# Patient Record
Sex: MALE | Race: WHITE | NOT HISPANIC OR LATINO | Employment: OTHER | URBAN - METROPOLITAN AREA
[De-identification: names, ages, dates, MRNs, and addresses within clinical notes are randomized per-mention and may not be internally consistent; named-entity substitution may affect disease eponyms.]

---

## 2024-01-01 ENCOUNTER — APPOINTMENT (OUTPATIENT)
Dept: GENERAL RADIOLOGY | Facility: HOSPITAL | Age: 69
End: 2024-01-01
Payer: MEDICARE

## 2024-01-01 ENCOUNTER — APPOINTMENT (OUTPATIENT)
Dept: CT IMAGING | Facility: HOSPITAL | Age: 69
End: 2024-01-01
Payer: MEDICARE

## 2024-01-01 ENCOUNTER — APPOINTMENT (OUTPATIENT)
Dept: CARDIOLOGY | Facility: HOSPITAL | Age: 69
End: 2024-01-01
Payer: MEDICARE

## 2024-01-01 ENCOUNTER — APPOINTMENT (OUTPATIENT)
Dept: ULTRASOUND IMAGING | Facility: HOSPITAL | Age: 69
End: 2024-01-01
Payer: MEDICARE

## 2024-01-01 ENCOUNTER — HOSPITAL ENCOUNTER (INPATIENT)
Facility: HOSPITAL | Age: 69
LOS: 9 days | End: 2024-09-27
Attending: EMERGENCY MEDICINE | Admitting: STUDENT IN AN ORGANIZED HEALTH CARE EDUCATION/TRAINING PROGRAM
Payer: MEDICARE

## 2024-01-01 VITALS
WEIGHT: 187.17 LBS | HEART RATE: 83 BPM | DIASTOLIC BLOOD PRESSURE: 55 MMHG | RESPIRATION RATE: 25 BRPM | SYSTOLIC BLOOD PRESSURE: 95 MMHG | HEIGHT: 68 IN | BODY MASS INDEX: 28.37 KG/M2 | TEMPERATURE: 100.7 F | OXYGEN SATURATION: 96 %

## 2024-01-01 DIAGNOSIS — A41.9 SEPSIS WITH ACUTE RENAL FAILURE WITHOUT SEPTIC SHOCK, DUE TO UNSPECIFIED ORGANISM, UNSPECIFIED ACUTE RENAL FAILURE TYPE: Primary | ICD-10-CM

## 2024-01-01 DIAGNOSIS — R53.1 GENERALIZED WEAKNESS: ICD-10-CM

## 2024-01-01 DIAGNOSIS — N17.9 SEPSIS WITH ACUTE RENAL FAILURE WITHOUT SEPTIC SHOCK, DUE TO UNSPECIFIED ORGANISM, UNSPECIFIED ACUTE RENAL FAILURE TYPE: Primary | ICD-10-CM

## 2024-01-01 DIAGNOSIS — D53.9 MACROCYTIC ANEMIA: ICD-10-CM

## 2024-01-01 DIAGNOSIS — R65.20 SEPSIS WITH ACUTE RENAL FAILURE WITHOUT SEPTIC SHOCK, DUE TO UNSPECIFIED ORGANISM, UNSPECIFIED ACUTE RENAL FAILURE TYPE: Primary | ICD-10-CM

## 2024-01-01 DIAGNOSIS — J18.9 PNEUMONIA OF RIGHT LOWER LOBE DUE TO INFECTIOUS ORGANISM: ICD-10-CM

## 2024-01-01 DIAGNOSIS — R79.89 ELEVATED TROPONIN: ICD-10-CM
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
## 2024-01-01 DIAGNOSIS — S52.022A CLOSED FRACTURE OF OLECRANON PROCESS OF LEFT ULNA, INITIAL ENCOUNTER: ICD-10-CM

## 2024-01-01 DIAGNOSIS — N39.0 ACUTE UTI: ICD-10-CM

## 2024-01-01 DIAGNOSIS — N17.9 AKI (ACUTE KIDNEY INJURY): ICD-10-CM

## 2024-01-01 DIAGNOSIS — E87.6 HYPOKALEMIA: ICD-10-CM

## 2024-01-01 LAB
25(OH)D3 SERPL-MCNC: <6 NG/ML (ref 30–100)
25(OH)D3 SERPL-MCNC: <6 NG/ML (ref 30–100)
ABO GROUP BLD: NORMAL
ABO GROUP BLD: NORMAL
ALBUMIN SERPL-MCNC: 1.5 G/DL (ref 3.5–5.2)
ALBUMIN SERPL-MCNC: 1.6 G/DL (ref 3.5–5.2)
ALBUMIN SERPL-MCNC: 1.7 G/DL (ref 3.5–5.2)
ALBUMIN SERPL-MCNC: 1.7 G/DL (ref 3.5–5.2)
ALBUMIN SERPL-MCNC: 1.8 G/DL (ref 3.5–5.2)
ALBUMIN SERPL-MCNC: 1.9 G/DL (ref 3.5–5.2)
ALBUMIN SERPL-MCNC: 2 G/DL (ref 3.5–5.2)
ALBUMIN SERPL-MCNC: 2.1 G/DL (ref 3.5–5.2)
ALBUMIN SERPL-MCNC: 2.3 G/DL (ref 3.5–5.2)
ALBUMIN SERPL-MCNC: 2.5 G/DL (ref 3.5–5.2)
ALBUMIN SERPL-MCNC: 2.5 G/DL (ref 3.5–5.2)
ALBUMIN SERPL-MCNC: 2.6 G/DL (ref 3.5–5.2)
ALBUMIN SERPL-MCNC: 2.7 G/DL (ref 3.5–5.2)
ALBUMIN SERPL-MCNC: 2.9 G/DL (ref 3.5–5.2)
ALBUMIN/GLOB SERPL: 0.5 G/DL
ALBUMIN/GLOB SERPL: 0.5 G/DL
ALBUMIN/GLOB SERPL: 0.7 G/DL
ALBUMIN/GLOB SERPL: 0.8 G/DL
ALBUMIN/GLOB SERPL: 1 G/DL
ALP SERPL-CCNC: 105 U/L (ref 39–117)
ALP SERPL-CCNC: 106 U/L (ref 39–117)
ALP SERPL-CCNC: 158 U/L (ref 39–117)
ALP SERPL-CCNC: 309 U/L (ref 39–117)
ALP SERPL-CCNC: 313 U/L (ref 39–117)
ALP SERPL-CCNC: 399 U/L (ref 39–117)
ALP SERPL-CCNC: 95 U/L (ref 39–117)
ALP SERPL-CCNC: 98 U/L (ref 39–117)
ALT SERPL W P-5'-P-CCNC: 12 U/L (ref 1–41)
ALT SERPL W P-5'-P-CCNC: 15 U/L (ref 1–41)
ALT SERPL W P-5'-P-CCNC: 16 U/L (ref 1–41)
ALT SERPL W P-5'-P-CCNC: 19 U/L (ref 1–41)
ALT SERPL W P-5'-P-CCNC: 22 U/L (ref 1–41)
ALT SERPL W P-5'-P-CCNC: 36 U/L (ref 1–41)
ALT SERPL W P-5'-P-CCNC: 368 U/L (ref 1–41)
ALT SERPL W P-5'-P-CCNC: 391 U/L (ref 1–41)
ANION GAP SERPL CALCULATED.3IONS-SCNC: 10 MMOL/L (ref 5–15)
ANION GAP SERPL CALCULATED.3IONS-SCNC: 10.5 MMOL/L (ref 5–15)
ANION GAP SERPL CALCULATED.3IONS-SCNC: 10.9 MMOL/L (ref 5–15)
ANION GAP SERPL CALCULATED.3IONS-SCNC: 11 MMOL/L (ref 5–15)
ANION GAP SERPL CALCULATED.3IONS-SCNC: 12 MMOL/L (ref 5–15)
ANION GAP SERPL CALCULATED.3IONS-SCNC: 12.1 MMOL/L (ref 5–15)
ANION GAP SERPL CALCULATED.3IONS-SCNC: 12.2 MMOL/L (ref 5–15)
ANION GAP SERPL CALCULATED.3IONS-SCNC: 12.5 MMOL/L (ref 5–15)
ANION GAP SERPL CALCULATED.3IONS-SCNC: 12.8 MMOL/L (ref 5–15)
ANION GAP SERPL CALCULATED.3IONS-SCNC: 13 MMOL/L (ref 5–15)
ANION GAP SERPL CALCULATED.3IONS-SCNC: 14.3 MMOL/L (ref 5–15)
ANION GAP SERPL CALCULATED.3IONS-SCNC: 15 MMOL/L (ref 5–15)
ANION GAP SERPL CALCULATED.3IONS-SCNC: 16 MMOL/L (ref 5–15)
ANION GAP SERPL CALCULATED.3IONS-SCNC: 16.6 MMOL/L (ref 5–15)
ANION GAP SERPL CALCULATED.3IONS-SCNC: 19 MMOL/L (ref 5–15)
ANION GAP SERPL CALCULATED.3IONS-SCNC: 23.5 MMOL/L (ref 5–15)
ANION GAP SERPL CALCULATED.3IONS-SCNC: 24 MMOL/L (ref 5–15)
ANION GAP SERPL CALCULATED.3IONS-SCNC: 26.4 MMOL/L (ref 5–15)
ANION GAP SERPL CALCULATED.3IONS-SCNC: 26.8 MMOL/L (ref 5–15)
ANION GAP SERPL CALCULATED.3IONS-SCNC: 27 MMOL/L (ref 5–15)
ANION GAP SERPL CALCULATED.3IONS-SCNC: 27 MMOL/L (ref 5–15)
ANION GAP SERPL CALCULATED.3IONS-SCNC: 30.5 MMOL/L (ref 5–15)
ANION GAP SERPL CALCULATED.3IONS-SCNC: 8.3 MMOL/L (ref 5–15)
ANION GAP SERPL CALCULATED.3IONS-SCNC: 9 MMOL/L (ref 5–15)
ANION GAP SERPL CALCULATED.3IONS-SCNC: 9 MMOL/L (ref 5–15)
ANISOCYTOSIS BLD QL: ABNORMAL
AORTIC DIMENSIONLESS INDEX: 0.7 (DI)
ARTERIAL PATENCY WRIST A: POSITIVE
ASCENDING AORTA: 3.1 CM
AST SERPL-CCNC: 1432 U/L (ref 1–40)
AST SERPL-CCNC: 27 U/L (ref 1–40)
AST SERPL-CCNC: 28 U/L (ref 1–40)
AST SERPL-CCNC: 31 U/L (ref 1–40)
AST SERPL-CCNC: 36 U/L (ref 1–40)
AST SERPL-CCNC: 78 U/L (ref 1–40)
AST SERPL-CCNC: 80 U/L (ref 1–40)
AST SERPL-CCNC: 985 U/L (ref 1–40)
ATMOSPHERIC PRESS: 740.4 MMHG
ATMOSPHERIC PRESS: 744.2 MMHG
ATMOSPHERIC PRESS: 746.9 MMHG
ATMOSPHERIC PRESS: 747.7 MMHG
ATMOSPHERIC PRESS: 747.7 MMHG
ATMOSPHERIC PRESS: 747.8 MMHG
ATMOSPHERIC PRESS: 748.1 MMHG
ATMOSPHERIC PRESS: 748.2 MMHG
ATMOSPHERIC PRESS: 749.1 MMHG
ATMOSPHERIC PRESS: 749.4 MMHG
ATMOSPHERIC PRESS: 749.8 MMHG
ATMOSPHERIC PRESS: 750.6 MMHG
ATMOSPHERIC PRESS: 750.7 MMHG
ATMOSPHERIC PRESS: 750.8 MMHG
B PARAPERT DNA SPEC QL NAA+PROBE: NOT DETECTED
B PERT DNA SPEC QL NAA+PROBE: NOT DETECTED
B-OH-BUTYR SERPL-SCNC: 0.55 MMOL/L (ref 0.02–0.27)
B-OH-BUTYR SERPL-SCNC: 2.34 MMOL/L (ref 0.02–0.27)
B-OH-BUTYR SERPL-SCNC: 2.42 MMOL/L (ref 0.02–0.27)
B-OH-BUTYR SERPL-SCNC: 2.75 MMOL/L (ref 0.02–0.27)
B-OH-BUTYR SERPL-SCNC: 3.35 MMOL/L (ref 0.02–0.27)
BACTERIA BLD CULT: ABNORMAL
BACTERIA FLD CULT: NORMAL
BACTERIA SPEC AEROBE CULT: ABNORMAL
BACTERIA SPEC AEROBE CULT: ABNORMAL
BACTERIA SPEC AEROBE CULT: NORMAL
BACTERIA SPEC AEROBE CULT: NORMAL
BACTERIA SPEC RESP CULT: ABNORMAL
BACTERIA SPEC RESP CULT: ABNORMAL
BACTERIA UR QL AUTO: ABNORMAL /HPF
BASE EXCESS BLDA CALC-SCNC: -1.9 MMOL/L (ref 0–2)
BASE EXCESS BLDA CALC-SCNC: -13.1 MMOL/L (ref 0–2)
BASE EXCESS BLDA CALC-SCNC: -13.5 MMOL/L (ref 0–2)
BASE EXCESS BLDA CALC-SCNC: -19.8 MMOL/L (ref 0–2)
BASE EXCESS BLDA CALC-SCNC: -2.1 MMOL/L (ref 0–2)
BASE EXCESS BLDA CALC-SCNC: -20.1 MMOL/L (ref 0–2)
BASE EXCESS BLDA CALC-SCNC: -3.1 MMOL/L (ref 0–2)
BASE EXCESS BLDA CALC-SCNC: -3.5 MMOL/L (ref 0–2)
BASE EXCESS BLDA CALC-SCNC: -3.9 MMOL/L (ref 0–2)
BASE EXCESS BLDA CALC-SCNC: -4.9 MMOL/L (ref 0–2)
BASE EXCESS BLDA CALC-SCNC: -5.3 MMOL/L (ref 0–2)
BASE EXCESS BLDA CALC-SCNC: -7.5 MMOL/L (ref 0–2)
BASE EXCESS BLDA CALC-SCNC: 0.7 MMOL/L (ref 0–2)
BASE EXCESS BLDA CALC-SCNC: 2.6 MMOL/L (ref 0–2)
BASOPHILS # BLD AUTO: 0.02 10*3/MM3 (ref 0–0.2)
BASOPHILS # BLD AUTO: 0.03 10*3/MM3 (ref 0–0.2)
BASOPHILS # BLD AUTO: 0.03 10*3/MM3 (ref 0–0.2)
BASOPHILS # BLD AUTO: 0.04 10*3/MM3 (ref 0–0.2)
BASOPHILS # BLD AUTO: 0.05 10*3/MM3 (ref 0–0.2)
BASOPHILS # BLD AUTO: 0.05 10*3/MM3 (ref 0–0.2)
BASOPHILS # BLD MANUAL: 0 10*3/MM3 (ref 0–0.2)
BASOPHILS NFR BLD AUTO: 0.1 % (ref 0–1.5)
BASOPHILS NFR BLD AUTO: 0.2 % (ref 0–1.5)
BASOPHILS NFR BLD MANUAL: 0 % (ref 0–1.5)
BDY SITE: ABNORMAL
BH BB BLOOD EXPIRATION DATE: NORMAL
BH BB BLOOD TYPE BARCODE: 5100
BH BB DISPENSE STATUS: NORMAL
BH BB PRODUCT CODE: NORMAL
BH BB UNIT NUMBER: NORMAL
BH CV ECHO MEAS - ACS: 2.05 CM
BH CV ECHO MEAS - AO MAX PG: 7 MMHG
BH CV ECHO MEAS - AO MEAN PG: 4.3 MMHG
BH CV ECHO MEAS - AO ROOT DIAM: 3.6 CM
BH CV ECHO MEAS - AO V2 MAX: 131.9 CM/SEC
BH CV ECHO MEAS - AO V2 VTI: 25.7 CM
BH CV ECHO MEAS - AVA(I,D): 2.24 CM2
BH CV ECHO MEAS - EDV(CUBED): 113.2 ML
BH CV ECHO MEAS - EDV(MOD-SP2): 150 ML
BH CV ECHO MEAS - EDV(MOD-SP4): 149 ML
BH CV ECHO MEAS - EF(MOD-SP2): 24 %
BH CV ECHO MEAS - EF(MOD-SP4): 16.8 %
BH CV ECHO MEAS - ESV(CUBED): 73.9 ML
BH CV ECHO MEAS - ESV(MOD-SP2): 114 ML
BH CV ECHO MEAS - ESV(MOD-SP4): 124 ML
BH CV ECHO MEAS - FS: 13.2 %
BH CV ECHO MEAS - IVS/LVPW: 1.02 CM
BH CV ECHO MEAS - IVSD: 0.89 CM
BH CV ECHO MEAS - LAT PEAK E' VEL: 11.9 CM/SEC
BH CV ECHO MEAS - LV DIASTOLIC VOL/BSA (35-75): 77.5 CM2
BH CV ECHO MEAS - LV MASS(C)D: 145.8 GRAMS
BH CV ECHO MEAS - LV MAX PG: 4.7 MMHG
BH CV ECHO MEAS - LV MEAN PG: 2.32 MMHG
BH CV ECHO MEAS - LV SYSTOLIC VOL/BSA (12-30): 64.5 CM2
BH CV ECHO MEAS - LV V1 MAX: 108.3 CM/SEC
BH CV ECHO MEAS - LV V1 VTI: 19 CM
BH CV ECHO MEAS - LVIDD: 4.8 CM
BH CV ECHO MEAS - LVIDS: 4.2 CM
BH CV ECHO MEAS - LVOT AREA: 3 CM2
BH CV ECHO MEAS - LVOT DIAM: 1.97 CM
BH CV ECHO MEAS - LVPWD: 0.87 CM
BH CV ECHO MEAS - MED PEAK E' VEL: 7.4 CM/SEC
BH CV ECHO MEAS - MR MAX PG: 102 MMHG
BH CV ECHO MEAS - MR MAX VEL: 504.9 CM/SEC
BH CV ECHO MEAS - MR MEAN PG: 71.7 MMHG
BH CV ECHO MEAS - MR MEAN VEL: 402.9 CM/SEC
BH CV ECHO MEAS - MR VTI: 146.8 CM
BH CV ECHO MEAS - MV A MAX VEL: 66 CM/SEC
BH CV ECHO MEAS - MV DEC SLOPE: 882 CM/SEC2
BH CV ECHO MEAS - MV DEC TIME: 0.13 SEC
BH CV ECHO MEAS - MV E MAX VEL: 96.7 CM/SEC
BH CV ECHO MEAS - MV E/A: 1.47
BH CV ECHO MEAS - MV MAX PG: 5.8 MMHG
BH CV ECHO MEAS - MV MEAN PG: 3.3 MMHG
BH CV ECHO MEAS - MV P1/2T: 40.4 MSEC
BH CV ECHO MEAS - MV V2 VTI: 23.2 CM
BH CV ECHO MEAS - MVA(P1/2T): 5.4 CM2
BH CV ECHO MEAS - MVA(VTI): 2.48 CM2
BH CV ECHO MEAS - PA ACC TIME: 0.08 SEC
BH CV ECHO MEAS - PA V2 MAX: 106.9 CM/SEC
BH CV ECHO MEAS - PULM DIAS VEL: 64.7 CM/SEC
BH CV ECHO MEAS - PULM S/D: 0.62
BH CV ECHO MEAS - PULM SYS VEL: 40.3 CM/SEC
BH CV ECHO MEAS - RAP SYSTOLE: 3 MMHG
BH CV ECHO MEAS - RV MAX PG: 2.12 MMHG
BH CV ECHO MEAS - RV V1 MAX: 72.8 CM/SEC
BH CV ECHO MEAS - RV V1 VTI: 13.7 CM
BH CV ECHO MEAS - SUP REN AO DIAM: 1.8 CM
BH CV ECHO MEAS - SV(LVOT): 57.7 ML
BH CV ECHO MEAS - SV(MOD-SP2): 36 ML
BH CV ECHO MEAS - SV(MOD-SP4): 25 ML
BH CV ECHO MEAS - SVI(LVOT): 30 ML/M2
BH CV ECHO MEAS - SVI(MOD-SP2): 18.7 ML/M2
BH CV ECHO MEAS - SVI(MOD-SP4): 13 ML/M2
BH CV ECHO MEAS - TAPSE (>1.6): 1.71 CM
BH CV ECHO MEASUREMENTS AVERAGE E/E' RATIO: 10.02
BH CV LOWER ARTERIAL LEFT ABI RATIO: 0.67
BH CV LOWER ARTERIAL LEFT CALF RATIO: 0.71
BH CV LOWER ARTERIAL LEFT DORSALIS PEDIS SYS MAX: 68
BH CV LOWER ARTERIAL LEFT LOW THIGH RATIO: 0.94
BH CV LOWER ARTERIAL LEFT LOW THIGH SYS MAX: 95
BH CV LOWER ARTERIAL LEFT POPLITEAL SYS MAX: 72
BH CV LOWER ARTERIAL LEFT POST TIBIAL SYS MAX: 64
BH CV LOWER ARTERIAL RIGHT ABI RATIO: 0.85
BH CV LOWER ARTERIAL RIGHT CALF RATIO: 1.06
BH CV LOWER ARTERIAL RIGHT DORSALIS PEDIS SYS MAX: 72
BH CV LOWER ARTERIAL RIGHT HIGH THIGH RATIO: 1.13
BH CV LOWER ARTERIAL RIGHT HIGH THIGH SYS MAX: 114
BH CV LOWER ARTERIAL RIGHT LOW THIGH RATIO: 1.07
BH CV LOWER ARTERIAL RIGHT LOW THIGH SYS MAX: 108
BH CV LOWER ARTERIAL RIGHT POPLITEAL SYS MAX: 107
BH CV LOWER ARTERIAL RIGHT POST TIBIAL SYS MAX: 86
BH CV XLRA - RV BASE: 2.9 CM
BH CV XLRA - RV LENGTH: 7.7 CM
BH CV XLRA - RV MID: 2.14 CM
BH CV XLRA - TDI S': 14 CM/SEC
BILIRUB CONJ SERPL-MCNC: 0.4 MG/DL (ref 0–0.3)
BILIRUB INDIRECT SERPL-MCNC: 0.1 MG/DL
BILIRUB SERPL-MCNC: 0.4 MG/DL (ref 0–1.2)
BILIRUB SERPL-MCNC: 0.5 MG/DL (ref 0–1.2)
BILIRUB SERPL-MCNC: 0.6 MG/DL (ref 0–1.2)
BILIRUB SERPL-MCNC: 0.6 MG/DL (ref 0–1.2)
BILIRUB SERPL-MCNC: 0.7 MG/DL (ref 0–1.2)
BILIRUB SERPL-MCNC: 0.7 MG/DL (ref 0–1.2)
BILIRUB SERPL-MCNC: 1.2 MG/DL (ref 0–1.2)
BILIRUB SERPL-MCNC: 2.1 MG/DL (ref 0–1.2)
BILIRUB UR QL STRIP: NEGATIVE
BLD GP AB SCN SERPL QL: NEGATIVE
BLD GP AB SCN SERPL QL: NEGATIVE
BOTTLE TYPE: ABNORMAL
BUN SERPL-MCNC: 117 MG/DL (ref 8–23)
BUN SERPL-MCNC: 119 MG/DL (ref 8–23)
BUN SERPL-MCNC: 119 MG/DL (ref 8–23)
BUN SERPL-MCNC: 12 MG/DL (ref 8–23)
BUN SERPL-MCNC: 122 MG/DL (ref 8–23)
BUN SERPL-MCNC: 123 MG/DL (ref 8–23)
BUN SERPL-MCNC: 124 MG/DL (ref 8–23)
BUN SERPL-MCNC: 127 MG/DL (ref 8–23)
BUN SERPL-MCNC: 128 MG/DL (ref 8–23)
BUN SERPL-MCNC: 129 MG/DL (ref 8–23)
BUN SERPL-MCNC: 14 MG/DL (ref 8–23)
BUN SERPL-MCNC: 18 MG/DL (ref 8–23)
BUN SERPL-MCNC: 18 MG/DL (ref 8–23)
BUN SERPL-MCNC: 20 MG/DL (ref 8–23)
BUN SERPL-MCNC: 20 MG/DL (ref 8–23)
BUN SERPL-MCNC: 21 MG/DL (ref 8–23)
BUN SERPL-MCNC: 25 MG/DL (ref 8–23)
BUN SERPL-MCNC: 25 MG/DL (ref 8–23)
BUN SERPL-MCNC: 30 MG/DL (ref 8–23)
BUN SERPL-MCNC: 32 MG/DL (ref 8–23)
BUN SERPL-MCNC: 37 MG/DL (ref 8–23)
BUN SERPL-MCNC: 38 MG/DL (ref 8–23)
BUN SERPL-MCNC: 44 MG/DL (ref 8–23)
BUN SERPL-MCNC: 50 MG/DL (ref 8–23)
BUN SERPL-MCNC: 52 MG/DL (ref 8–23)
BUN SERPL-MCNC: 56 MG/DL (ref 8–23)
BUN SERPL-MCNC: 66 MG/DL (ref 8–23)
BUN SERPL-MCNC: 76 MG/DL (ref 8–23)
BUN SERPL-MCNC: 89 MG/DL (ref 8–23)
BUN/CREAT SERPL: 16.7 (ref 7–25)
BUN/CREAT SERPL: 16.8 (ref 7–25)
BUN/CREAT SERPL: 17.2 (ref 7–25)
BUN/CREAT SERPL: 17.9 (ref 7–25)
BUN/CREAT SERPL: 18.2 (ref 7–25)
BUN/CREAT SERPL: 18.2 (ref 7–25)
BUN/CREAT SERPL: 18.8 (ref 7–25)
BUN/CREAT SERPL: 18.9 (ref 7–25)
BUN/CREAT SERPL: 19 (ref 7–25)
BUN/CREAT SERPL: 19.1 (ref 7–25)
BUN/CREAT SERPL: 19.4 (ref 7–25)
BUN/CREAT SERPL: 20.4 (ref 7–25)
BUN/CREAT SERPL: 20.8 (ref 7–25)
BUN/CREAT SERPL: 21 (ref 7–25)
BUN/CREAT SERPL: 21.3 (ref 7–25)
BUN/CREAT SERPL: 21.3 (ref 7–25)
BUN/CREAT SERPL: 21.9 (ref 7–25)
BUN/CREAT SERPL: 22.7 (ref 7–25)
BUN/CREAT SERPL: 23.9 (ref 7–25)
BUN/CREAT SERPL: 24.2 (ref 7–25)
BUN/CREAT SERPL: 24.5 (ref 7–25)
BUN/CREAT SERPL: 25.2 (ref 7–25)
BUN/CREAT SERPL: 25.3 (ref 7–25)
BUN/CREAT SERPL: 26.4 (ref 7–25)
BUN/CREAT SERPL: 26.4 (ref 7–25)
BUN/CREAT SERPL: 27.5 (ref 7–25)
BUN/CREAT SERPL: 29.7 (ref 7–25)
BUN/CREAT SERPL: 29.9 (ref 7–25)
BUN/CREAT SERPL: 30.5 (ref 7–25)
BUN/CREAT SERPL: 32.3 (ref 7–25)
BUN/CREAT SERPL: 4.4 (ref 7–25)
C PNEUM DNA NPH QL NAA+NON-PROBE: NOT DETECTED
CA-I SERPL ISE-MCNC: 0.91 MMOL/L (ref 1.15–1.35)
CA-I SERPL ISE-MCNC: 0.92 MMOL/L (ref 1.15–1.35)
CA-I SERPL ISE-MCNC: 0.93 MMOL/L (ref 1.15–1.35)
CA-I SERPL ISE-MCNC: 0.93 MMOL/L (ref 1.15–1.35)
CA-I SERPL ISE-MCNC: 0.98 MMOL/L (ref 1.15–1.35)
CA-I SERPL ISE-MCNC: 1.01 MMOL/L (ref 1.15–1.35)
CA-I SERPL ISE-MCNC: 1.02 MMOL/L (ref 1.15–1.35)
CA-I SERPL ISE-MCNC: 1.04 MMOL/L (ref 1.15–1.35)
CA-I SERPL ISE-MCNC: 1.06 MMOL/L (ref 1.15–1.35)
CA-I SERPL ISE-MCNC: 1.06 MMOL/L (ref 1.15–1.35)
CA-I SERPL ISE-MCNC: 1.07 MMOL/L (ref 1.15–1.35)
CA-I SERPL ISE-MCNC: 1.09 MMOL/L (ref 1.15–1.35)
CA-I SERPL ISE-MCNC: 1.11 MMOL/L (ref 1.15–1.35)
CALCIUM SPEC-SCNC: 6.5 MG/DL (ref 8.6–10.5)
CALCIUM SPEC-SCNC: 6.5 MG/DL (ref 8.6–10.5)
CALCIUM SPEC-SCNC: 6.6 MG/DL (ref 8.6–10.5)
CALCIUM SPEC-SCNC: 6.7 MG/DL (ref 8.6–10.5)
CALCIUM SPEC-SCNC: 6.7 MG/DL (ref 8.6–10.5)
CALCIUM SPEC-SCNC: 6.8 MG/DL (ref 8.6–10.5)
CALCIUM SPEC-SCNC: 6.8 MG/DL (ref 8.6–10.5)
CALCIUM SPEC-SCNC: 6.9 MG/DL (ref 8.6–10.5)
CALCIUM SPEC-SCNC: 6.9 MG/DL (ref 8.6–10.5)
CALCIUM SPEC-SCNC: 7 MG/DL (ref 8.6–10.5)
CALCIUM SPEC-SCNC: 7 MG/DL (ref 8.6–10.5)
CALCIUM SPEC-SCNC: 7.1 MG/DL (ref 8.6–10.5)
CALCIUM SPEC-SCNC: 7.1 MG/DL (ref 8.6–10.5)
CALCIUM SPEC-SCNC: 7.2 MG/DL (ref 8.6–10.5)
CALCIUM SPEC-SCNC: 7.2 MG/DL (ref 8.6–10.5)
CALCIUM SPEC-SCNC: 7.3 MG/DL (ref 8.6–10.5)
CALCIUM SPEC-SCNC: 7.4 MG/DL (ref 8.6–10.5)
CALCIUM SPEC-SCNC: 7.5 MG/DL (ref 8.6–10.5)
CALCIUM SPEC-SCNC: 7.6 MG/DL (ref 8.6–10.5)
CALCIUM SPEC-SCNC: 7.6 MG/DL (ref 8.6–10.5)
CALCIUM SPEC-SCNC: 7.7 MG/DL (ref 8.6–10.5)
CALCIUM SPEC-SCNC: 7.8 MG/DL (ref 8.6–10.5)
CHLORIDE SERPL-SCNC: 100 MMOL/L (ref 98–107)
CHLORIDE SERPL-SCNC: 101 MMOL/L (ref 98–107)
CHLORIDE SERPL-SCNC: 102 MMOL/L (ref 98–107)
CHLORIDE SERPL-SCNC: 103 MMOL/L (ref 98–107)
CHLORIDE SERPL-SCNC: 104 MMOL/L (ref 98–107)
CHLORIDE SERPL-SCNC: 105 MMOL/L (ref 98–107)
CHLORIDE SERPL-SCNC: 106 MMOL/L (ref 98–107)
CHLORIDE SERPL-SCNC: 108 MMOL/L (ref 98–107)
CHLORIDE SERPL-SCNC: 109 MMOL/L (ref 98–107)
CHLORIDE SERPL-SCNC: 96 MMOL/L (ref 98–107)
CHLORIDE SERPL-SCNC: 99 MMOL/L (ref 98–107)
CK SERPL-CCNC: 681 U/L (ref 20–200)
CK SERPL-CCNC: 692 U/L (ref 20–200)
CK SERPL-CCNC: ABNORMAL U/L (ref 20–200)
CLARITY UR: ABNORMAL
CO2 BLDA-SCNC: 10.3 MMOL/L (ref 23–27)
CO2 BLDA-SCNC: 10.7 MMOL/L (ref 23–27)
CO2 BLDA-SCNC: 14.9 MMOL/L (ref 23–27)
CO2 BLDA-SCNC: 20.8 MMOL/L (ref 23–27)
CO2 BLDA-SCNC: 21.1 MMOL/L (ref 23–27)
CO2 BLDA-SCNC: 21.6 MMOL/L (ref 23–27)
CO2 BLDA-SCNC: 21.7 MMOL/L (ref 23–27)
CO2 BLDA-SCNC: 22.3 MMOL/L (ref 23–27)
CO2 BLDA-SCNC: 22.5 MMOL/L (ref 23–27)
CO2 BLDA-SCNC: 23.7 MMOL/L (ref 23–27)
CO2 BLDA-SCNC: 25 MMOL/L (ref 23–27)
CO2 BLDA-SCNC: 25.4 MMOL/L (ref 23–27)
CO2 BLDA-SCNC: 25.4 MMOL/L (ref 23–27)
CO2 BLDA-SCNC: 8.9 MMOL/L (ref 23–27)
CO2 SERPL-SCNC: 10.2 MMOL/L (ref 22–29)
CO2 SERPL-SCNC: 10.6 MMOL/L (ref 22–29)
CO2 SERPL-SCNC: 11 MMOL/L (ref 22–29)
CO2 SERPL-SCNC: 11.5 MMOL/L (ref 22–29)
CO2 SERPL-SCNC: 17 MMOL/L (ref 22–29)
CO2 SERPL-SCNC: 18 MMOL/L (ref 22–29)
CO2 SERPL-SCNC: 18 MMOL/L (ref 22–29)
CO2 SERPL-SCNC: 19 MMOL/L (ref 22–29)
CO2 SERPL-SCNC: 19.8 MMOL/L (ref 22–29)
CO2 SERPL-SCNC: 20 MMOL/L (ref 22–29)
CO2 SERPL-SCNC: 20.1 MMOL/L (ref 22–29)
CO2 SERPL-SCNC: 20.2 MMOL/L (ref 22–29)
CO2 SERPL-SCNC: 20.4 MMOL/L (ref 22–29)
CO2 SERPL-SCNC: 20.5 MMOL/L (ref 22–29)
CO2 SERPL-SCNC: 20.7 MMOL/L (ref 22–29)
CO2 SERPL-SCNC: 21 MMOL/L (ref 22–29)
CO2 SERPL-SCNC: 21.5 MMOL/L (ref 22–29)
CO2 SERPL-SCNC: 21.7 MMOL/L (ref 22–29)
CO2 SERPL-SCNC: 22 MMOL/L (ref 22–29)
CO2 SERPL-SCNC: 22.9 MMOL/L (ref 22–29)
CO2 SERPL-SCNC: 23 MMOL/L (ref 22–29)
CO2 SERPL-SCNC: 23 MMOL/L (ref 22–29)
CO2 SERPL-SCNC: 8 MMOL/L (ref 22–29)
CO2 SERPL-SCNC: 8 MMOL/L (ref 22–29)
CO2 SERPL-SCNC: 9.5 MMOL/L (ref 22–29)
COLOR UR: YELLOW
CREAT SERPL-MCNC: 0.59 MG/DL (ref 0.76–1.27)
CREAT SERPL-MCNC: 0.64 MG/DL (ref 0.76–1.27)
CREAT SERPL-MCNC: 0.67 MG/DL (ref 0.76–1.27)
CREAT SERPL-MCNC: 0.78 MG/DL (ref 0.76–1.27)
CREAT SERPL-MCNC: 0.84 MG/DL (ref 0.76–1.27)
CREAT SERPL-MCNC: 0.88 MG/DL (ref 0.76–1.27)
CREAT SERPL-MCNC: 0.98 MG/DL (ref 0.76–1.27)
CREAT SERPL-MCNC: 0.99 MG/DL (ref 0.76–1.27)
CREAT SERPL-MCNC: 1.07 MG/DL (ref 0.76–1.27)
CREAT SERPL-MCNC: 1.21 MG/DL (ref 0.76–1.27)
CREAT SERPL-MCNC: 1.28 MG/DL (ref 0.76–1.27)
CREAT SERPL-MCNC: 1.37 MG/DL (ref 0.76–1.27)
CREAT SERPL-MCNC: 1.37 MG/DL (ref 0.76–1.27)
CREAT SERPL-MCNC: 1.55 MG/DL (ref 0.76–1.27)
CREAT SERPL-MCNC: 1.63 MG/DL (ref 0.76–1.27)
CREAT SERPL-MCNC: 1.97 MG/DL (ref 0.76–1.27)
CREAT SERPL-MCNC: 2.12 MG/DL (ref 0.76–1.27)
CREAT SERPL-MCNC: 2.22 MG/DL (ref 0.76–1.27)
CREAT SERPL-MCNC: 2.69 MG/DL (ref 0.76–1.27)
CREAT SERPL-MCNC: 2.76 MG/DL (ref 0.76–1.27)
CREAT SERPL-MCNC: 3.15 MG/DL (ref 0.76–1.27)
CREAT SERPL-MCNC: 3.68 MG/DL (ref 0.76–1.27)
CREAT SERPL-MCNC: 5.59 MG/DL (ref 0.76–1.27)
CREAT SERPL-MCNC: 5.59 MG/DL (ref 0.76–1.27)
CREAT SERPL-MCNC: 5.87 MG/DL (ref 0.76–1.27)
CREAT SERPL-MCNC: 6.3 MG/DL (ref 0.76–1.27)
CREAT SERPL-MCNC: 6.49 MG/DL (ref 0.76–1.27)
CREAT SERPL-MCNC: 6.71 MG/DL (ref 0.76–1.27)
CREAT SERPL-MCNC: 6.72 MG/DL (ref 0.76–1.27)
CREAT SERPL-MCNC: 6.82 MG/DL (ref 0.76–1.27)
CREAT SERPL-MCNC: 7.08 MG/DL (ref 0.76–1.27)
CREAT UR-MCNC: 50 MG/DL
CROSSMATCH INTERPRETATION: NORMAL
D-LACTATE SERPL-SCNC: 0.9 MMOL/L (ref 0.5–2)
D-LACTATE SERPL-SCNC: 1.2 MMOL/L (ref 0.5–2)
D-LACTATE SERPL-SCNC: 1.5 MMOL/L (ref 0.5–2)
D-LACTATE SERPL-SCNC: 1.5 MMOL/L (ref 0.5–2)
D-LACTATE SERPL-SCNC: 1.6 MMOL/L (ref 0.5–2)
D-LACTATE SERPL-SCNC: 1.8 MMOL/L (ref 0.5–2)
DEPRECATED RDW RBC AUTO: 60 FL (ref 37–54)
DEPRECATED RDW RBC AUTO: 66.5 FL (ref 37–54)
DEPRECATED RDW RBC AUTO: 68.6 FL (ref 37–54)
DEPRECATED RDW RBC AUTO: 68.9 FL (ref 37–54)
DEPRECATED RDW RBC AUTO: 72.8 FL (ref 37–54)
DEPRECATED RDW RBC AUTO: 74.8 FL (ref 37–54)
DEPRECATED RDW RBC AUTO: 75.3 FL (ref 37–54)
DEPRECATED RDW RBC AUTO: 75.4 FL (ref 37–54)
DEPRECATED RDW RBC AUTO: 76.4 FL (ref 37–54)
DEPRECATED RDW RBC AUTO: 77.4 FL (ref 37–54)
DEPRECATED RDW RBC AUTO: 80.1 FL (ref 37–54)
DEPRECATED RDW RBC AUTO: 80.4 FL (ref 37–54)
DEPRECATED RDW RBC AUTO: 81.5 FL (ref 37–54)
DEVICE COMMENT: ABNORMAL
EGFRCR SERPLBLD CKD-EPI 2021: 10.5 ML/MIN/1.73
EGFRCR SERPLBLD CKD-EPI 2021: 10.5 ML/MIN/1.73
EGFRCR SERPLBLD CKD-EPI 2021: 102.3 ML/MIN/1.73
EGFRCR SERPLBLD CKD-EPI 2021: 103.8 ML/MIN/1.73
EGFRCR SERPLBLD CKD-EPI 2021: 106.3 ML/MIN/1.73
EGFRCR SERPLBLD CKD-EPI 2021: 17.3 ML/MIN/1.73
EGFRCR SERPLBLD CKD-EPI 2021: 20.8 ML/MIN/1.73
EGFRCR SERPLBLD CKD-EPI 2021: 24.4 ML/MIN/1.73
EGFRCR SERPLBLD CKD-EPI 2021: 25.2 ML/MIN/1.73
EGFRCR SERPLBLD CKD-EPI 2021: 31.7 ML/MIN/1.73
EGFRCR SERPLBLD CKD-EPI 2021: 33.5 ML/MIN/1.73
EGFRCR SERPLBLD CKD-EPI 2021: 36.6 ML/MIN/1.73
EGFRCR SERPLBLD CKD-EPI 2021: 45.9 ML/MIN/1.73
EGFRCR SERPLBLD CKD-EPI 2021: 48.8 ML/MIN/1.73
EGFRCR SERPLBLD CKD-EPI 2021: 56.5 ML/MIN/1.73
EGFRCR SERPLBLD CKD-EPI 2021: 56.5 ML/MIN/1.73
EGFRCR SERPLBLD CKD-EPI 2021: 61.3 ML/MIN/1.73
EGFRCR SERPLBLD CKD-EPI 2021: 65.6 ML/MIN/1.73
EGFRCR SERPLBLD CKD-EPI 2021: 7.9 ML/MIN/1.73
EGFRCR SERPLBLD CKD-EPI 2021: 76.1 ML/MIN/1.73
EGFRCR SERPLBLD CKD-EPI 2021: 8.2 ML/MIN/1.73
EGFRCR SERPLBLD CKD-EPI 2021: 8.4 ML/MIN/1.73
EGFRCR SERPLBLD CKD-EPI 2021: 8.4 ML/MIN/1.73
EGFRCR SERPLBLD CKD-EPI 2021: 8.7 ML/MIN/1.73
EGFRCR SERPLBLD CKD-EPI 2021: 83.5 ML/MIN/1.73
EGFRCR SERPLBLD CKD-EPI 2021: 84.5 ML/MIN/1.73
EGFRCR SERPLBLD CKD-EPI 2021: 9.1 ML/MIN/1.73
EGFRCR SERPLBLD CKD-EPI 2021: 9.9 ML/MIN/1.73
EGFRCR SERPLBLD CKD-EPI 2021: 94.2 ML/MIN/1.73
EGFRCR SERPLBLD CKD-EPI 2021: 95.6 ML/MIN/1.73
EGFRCR SERPLBLD CKD-EPI 2021: 97.7 ML/MIN/1.73
EOSINOPHIL # BLD AUTO: 0 10*3/MM3 (ref 0–0.4)
EOSINOPHIL # BLD AUTO: 0.01 10*3/MM3 (ref 0–0.4)
EOSINOPHIL # BLD AUTO: 0.05 10*3/MM3 (ref 0–0.4)
EOSINOPHIL # BLD AUTO: 0.14 10*3/MM3 (ref 0–0.4)
EOSINOPHIL # BLD AUTO: 0.15 10*3/MM3 (ref 0–0.4)
EOSINOPHIL # BLD AUTO: 0.23 10*3/MM3 (ref 0–0.4)
EOSINOPHIL # BLD MANUAL: 0 10*3/MM3 (ref 0–0.4)
EOSINOPHIL NFR BLD AUTO: 0 % (ref 0.3–6.2)
EOSINOPHIL NFR BLD AUTO: 0.1 % (ref 0.3–6.2)
EOSINOPHIL NFR BLD AUTO: 0.2 % (ref 0.3–6.2)
EOSINOPHIL NFR BLD AUTO: 0.8 % (ref 0.3–6.2)
EOSINOPHIL NFR BLD AUTO: 0.8 % (ref 0.3–6.2)
EOSINOPHIL NFR BLD AUTO: 1.3 % (ref 0.3–6.2)
EOSINOPHIL NFR BLD MANUAL: 0 % (ref 0.3–6.2)
ERYTHROCYTE [DISTWIDTH] IN BLOOD BY AUTOMATED COUNT: 14.6 % (ref 12.3–15.4)
ERYTHROCYTE [DISTWIDTH] IN BLOOD BY AUTOMATED COUNT: 18.7 % (ref 12.3–15.4)
ERYTHROCYTE [DISTWIDTH] IN BLOOD BY AUTOMATED COUNT: 18.8 % (ref 12.3–15.4)
ERYTHROCYTE [DISTWIDTH] IN BLOOD BY AUTOMATED COUNT: 19.3 % (ref 12.3–15.4)
ERYTHROCYTE [DISTWIDTH] IN BLOOD BY AUTOMATED COUNT: 19.6 % (ref 12.3–15.4)
ERYTHROCYTE [DISTWIDTH] IN BLOOD BY AUTOMATED COUNT: 20.4 % (ref 12.3–15.4)
ERYTHROCYTE [DISTWIDTH] IN BLOOD BY AUTOMATED COUNT: 21 % (ref 12.3–15.4)
ERYTHROCYTE [DISTWIDTH] IN BLOOD BY AUTOMATED COUNT: 21.3 % (ref 12.3–15.4)
ERYTHROCYTE [DISTWIDTH] IN BLOOD BY AUTOMATED COUNT: 21.3 % (ref 12.3–15.4)
ERYTHROCYTE [DISTWIDTH] IN BLOOD BY AUTOMATED COUNT: 21.5 % (ref 12.3–15.4)
ERYTHROCYTE [DISTWIDTH] IN BLOOD BY AUTOMATED COUNT: 21.7 % (ref 12.3–15.4)
ERYTHROCYTE [DISTWIDTH] IN BLOOD BY AUTOMATED COUNT: 21.7 % (ref 12.3–15.4)
ERYTHROCYTE [DISTWIDTH] IN BLOOD BY AUTOMATED COUNT: 22.3 % (ref 12.3–15.4)
FERRITIN SERPL-MCNC: 1835 NG/ML (ref 30–400)
FLUAV SUBTYP SPEC NAA+PROBE: NOT DETECTED
FLUBV RNA ISLT QL NAA+PROBE: NOT DETECTED
FOLATE BLD-MCNC: 570 NG/ML
FOLATE RBC-MCNC: 2405 NG/ML
FOLATE SERPL-MCNC: >20 NG/ML (ref 4.78–24.2)
GAS FLOW AIRWAY: 15 LPM
GAS FLOW AIRWAY: 2 LPM
GEN 5 2HR TROPONIN T REFLEX: 600 NG/L
GLOBULIN UR ELPH-MCNC: 2.5 GM/DL
GLOBULIN UR ELPH-MCNC: 2.6 GM/DL
GLOBULIN UR ELPH-MCNC: 2.7 GM/DL
GLOBULIN UR ELPH-MCNC: 2.7 GM/DL
GLOBULIN UR ELPH-MCNC: 2.9 GM/DL
GLOBULIN UR ELPH-MCNC: 3.1 GM/DL
GLOBULIN UR ELPH-MCNC: 3.3 GM/DL
GLUCOSE BLDC GLUCOMTR-MCNC: 105 MG/DL (ref 70–130)
GLUCOSE BLDC GLUCOMTR-MCNC: 108 MG/DL (ref 70–130)
GLUCOSE BLDC GLUCOMTR-MCNC: 110 MG/DL (ref 70–130)
GLUCOSE BLDC GLUCOMTR-MCNC: 113 MG/DL (ref 70–130)
GLUCOSE BLDC GLUCOMTR-MCNC: 116 MG/DL (ref 70–130)
GLUCOSE BLDC GLUCOMTR-MCNC: 124 MG/DL (ref 70–130)
GLUCOSE BLDC GLUCOMTR-MCNC: 133 MG/DL (ref 70–130)
GLUCOSE BLDC GLUCOMTR-MCNC: 134 MG/DL (ref 70–130)
GLUCOSE BLDC GLUCOMTR-MCNC: 136 MG/DL (ref 70–130)
GLUCOSE BLDC GLUCOMTR-MCNC: 137 MG/DL (ref 70–130)
GLUCOSE BLDC GLUCOMTR-MCNC: 139 MG/DL (ref 70–130)
GLUCOSE BLDC GLUCOMTR-MCNC: 142 MG/DL (ref 70–130)
GLUCOSE BLDC GLUCOMTR-MCNC: 157 MG/DL (ref 70–130)
GLUCOSE BLDC GLUCOMTR-MCNC: 161 MG/DL (ref 70–130)
GLUCOSE BLDC GLUCOMTR-MCNC: 162 MG/DL (ref 70–130)
GLUCOSE BLDC GLUCOMTR-MCNC: 22 MG/DL (ref 70–130)
GLUCOSE BLDC GLUCOMTR-MCNC: 244 MG/DL (ref 70–130)
GLUCOSE BLDC GLUCOMTR-MCNC: 265 MG/DL (ref 70–130)
GLUCOSE BLDC GLUCOMTR-MCNC: 35 MG/DL (ref 70–130)
GLUCOSE BLDC GLUCOMTR-MCNC: 62 MG/DL (ref 70–130)
GLUCOSE BLDC GLUCOMTR-MCNC: 71 MG/DL (ref 70–130)
GLUCOSE BLDC GLUCOMTR-MCNC: 73 MG/DL (ref 70–130)
GLUCOSE BLDC GLUCOMTR-MCNC: 75 MG/DL (ref 70–130)
GLUCOSE BLDC GLUCOMTR-MCNC: 76 MG/DL (ref 70–130)
GLUCOSE BLDC GLUCOMTR-MCNC: 77 MG/DL (ref 70–130)
GLUCOSE BLDC GLUCOMTR-MCNC: 80 MG/DL (ref 70–130)
GLUCOSE BLDC GLUCOMTR-MCNC: 82 MG/DL (ref 70–130)
GLUCOSE BLDC GLUCOMTR-MCNC: 86 MG/DL (ref 70–130)
GLUCOSE BLDC GLUCOMTR-MCNC: 91 MG/DL (ref 70–130)
GLUCOSE BLDC GLUCOMTR-MCNC: 93 MG/DL (ref 70–130)
GLUCOSE BLDC GLUCOMTR-MCNC: 95 MG/DL (ref 70–130)
GLUCOSE BLDC GLUCOMTR-MCNC: 99 MG/DL (ref 70–130)
GLUCOSE SERPL-MCNC: 106 MG/DL (ref 65–99)
GLUCOSE SERPL-MCNC: 107 MG/DL (ref 65–99)
GLUCOSE SERPL-MCNC: 112 MG/DL (ref 65–99)
GLUCOSE SERPL-MCNC: 114 MG/DL (ref 65–99)
GLUCOSE SERPL-MCNC: 115 MG/DL (ref 65–99)
GLUCOSE SERPL-MCNC: 116 MG/DL (ref 65–99)
GLUCOSE SERPL-MCNC: 119 MG/DL (ref 65–99)
GLUCOSE SERPL-MCNC: 130 MG/DL (ref 65–99)
GLUCOSE SERPL-MCNC: 139 MG/DL (ref 65–99)
GLUCOSE SERPL-MCNC: 149 MG/DL (ref 65–99)
GLUCOSE SERPL-MCNC: 158 MG/DL (ref 65–99)
GLUCOSE SERPL-MCNC: 180 MG/DL (ref 65–99)
GLUCOSE SERPL-MCNC: 233 MG/DL (ref 65–99)
GLUCOSE SERPL-MCNC: 36 MG/DL (ref 65–99)
GLUCOSE SERPL-MCNC: 56 MG/DL (ref 65–99)
GLUCOSE SERPL-MCNC: 70 MG/DL (ref 65–99)
GLUCOSE SERPL-MCNC: 70 MG/DL (ref 65–99)
GLUCOSE SERPL-MCNC: 75 MG/DL (ref 65–99)
GLUCOSE SERPL-MCNC: 79 MG/DL (ref 65–99)
GLUCOSE SERPL-MCNC: 80 MG/DL (ref 65–99)
GLUCOSE SERPL-MCNC: 81 MG/DL (ref 65–99)
GLUCOSE SERPL-MCNC: 83 MG/DL (ref 65–99)
GLUCOSE SERPL-MCNC: 86 MG/DL (ref 65–99)
GLUCOSE SERPL-MCNC: 93 MG/DL (ref 65–99)
GLUCOSE SERPL-MCNC: 95 MG/DL (ref 65–99)
GLUCOSE SERPL-MCNC: 95 MG/DL (ref 65–99)
GLUCOSE SERPL-MCNC: 97 MG/DL (ref 65–99)
GLUCOSE SERPL-MCNC: 99 MG/DL (ref 65–99)
GLUCOSE SERPL-MCNC: 99 MG/DL (ref 65–99)
GLUCOSE UR STRIP-MCNC: NEGATIVE MG/DL
GRAM STN SPEC: ABNORMAL
GRAM STN SPEC: NORMAL
HADV DNA SPEC NAA+PROBE: NOT DETECTED
HAPTOGLOB SERPL-MCNC: 344 MG/DL (ref 30–200)
HBV SURFACE AG SERPL QL IA: NORMAL
HCO3 BLDA-SCNC: 10.1 MMOL/L (ref 22–28)
HCO3 BLDA-SCNC: 13.8 MMOL/L (ref 22–28)
HCO3 BLDA-SCNC: 19.7 MMOL/L (ref 22–28)
HCO3 BLDA-SCNC: 20.2 MMOL/L (ref 22–28)
HCO3 BLDA-SCNC: 20.5 MMOL/L (ref 22–28)
HCO3 BLDA-SCNC: 20.8 MMOL/L (ref 22–28)
HCO3 BLDA-SCNC: 20.8 MMOL/L (ref 22–28)
HCO3 BLDA-SCNC: 21.2 MMOL/L (ref 22–28)
HCO3 BLDA-SCNC: 22.1 MMOL/L (ref 22–28)
HCO3 BLDA-SCNC: 23.7 MMOL/L (ref 22–28)
HCO3 BLDA-SCNC: 24.4 MMOL/L (ref 22–28)
HCO3 BLDA-SCNC: 24.6 MMOL/L (ref 22–28)
HCO3 BLDA-SCNC: 8.1 MMOL/L (ref 22–28)
HCO3 BLDA-SCNC: 9.3 MMOL/L (ref 22–28)
HCOV 229E RNA SPEC QL NAA+PROBE: NOT DETECTED
HCOV HKU1 RNA SPEC QL NAA+PROBE: NOT DETECTED
HCOV NL63 RNA SPEC QL NAA+PROBE: NOT DETECTED
HCOV OC43 RNA SPEC QL NAA+PROBE: NOT DETECTED
HCT VFR BLD AUTO: 15.6 % (ref 37.5–51)
HCT VFR BLD AUTO: 19.1 % (ref 37.5–51)
HCT VFR BLD AUTO: 19.7 % (ref 37.5–51)
HCT VFR BLD AUTO: 21.2 % (ref 37.5–51)
HCT VFR BLD AUTO: 21.4 % (ref 37.5–51)
HCT VFR BLD AUTO: 21.9 % (ref 37.5–51)
HCT VFR BLD AUTO: 22.1 % (ref 37.5–51)
HCT VFR BLD AUTO: 22.2 % (ref 37.5–51)
HCT VFR BLD AUTO: 22.7 % (ref 37.5–51)
HCT VFR BLD AUTO: 22.8 % (ref 37.5–51)
HCT VFR BLD AUTO: 23.4 % (ref 37.5–51)
HCT VFR BLD AUTO: 23.7 % (ref 37.5–51)
HCT VFR BLD AUTO: 23.8 % (ref 37.5–51)
HCT VFR BLD AUTO: 24 % (ref 37.5–51)
HCT VFR BLD AUTO: 24.4 % (ref 37.5–51)
HCT VFR BLD AUTO: 25.6 % (ref 37.5–51)
HCT VFR BLD AUTO: 26.9 % (ref 37.5–51)
HEMOCCULT STL QL: POSITIVE
HEMODILUTION: NO
HGB BLD-MCNC: 5.6 G/DL (ref 13–17.7)
HGB BLD-MCNC: 6.4 G/DL (ref 13–17.7)
HGB BLD-MCNC: 6.6 G/DL (ref 13–17.7)
HGB BLD-MCNC: 7.1 G/DL (ref 13–17.7)
HGB BLD-MCNC: 7.3 G/DL (ref 13–17.7)
HGB BLD-MCNC: 7.4 G/DL (ref 13–17.7)
HGB BLD-MCNC: 7.5 G/DL (ref 13–17.7)
HGB BLD-MCNC: 7.5 G/DL (ref 13–17.7)
HGB BLD-MCNC: 7.7 G/DL (ref 13–17.7)
HGB BLD-MCNC: 7.7 G/DL (ref 13–17.7)
HGB BLD-MCNC: 7.9 G/DL (ref 13–17.7)
HGB BLD-MCNC: 7.9 G/DL (ref 13–17.7)
HGB BLD-MCNC: 8 G/DL (ref 13–17.7)
HGB BLD-MCNC: 8.1 G/DL (ref 13–17.7)
HGB BLD-MCNC: 8.8 G/DL (ref 13–17.7)
HGB BLD-MCNC: 9.3 G/DL (ref 13–17.7)
HGB UR QL STRIP.AUTO: ABNORMAL
HMPV RNA NPH QL NAA+NON-PROBE: NOT DETECTED
HPIV1 RNA ISLT QL NAA+PROBE: NOT DETECTED
HPIV2 RNA SPEC QL NAA+PROBE: NOT DETECTED
HPIV3 RNA NPH QL NAA+PROBE: NOT DETECTED
HPIV4 P GENE NPH QL NAA+PROBE: NOT DETECTED
HYALINE CASTS UR QL AUTO: ABNORMAL /LPF
HYPOCHROMIA BLD QL: ABNORMAL
IMM GRANULOCYTES # BLD AUTO: 0.31 10*3/MM3 (ref 0–0.05)
IMM GRANULOCYTES # BLD AUTO: 0.33 10*3/MM3 (ref 0–0.05)
IMM GRANULOCYTES # BLD AUTO: 0.34 10*3/MM3 (ref 0–0.05)
IMM GRANULOCYTES # BLD AUTO: 0.41 10*3/MM3 (ref 0–0.05)
IMM GRANULOCYTES # BLD AUTO: 0.55 10*3/MM3 (ref 0–0.05)
IMM GRANULOCYTES # BLD AUTO: 0.58 10*3/MM3 (ref 0–0.05)
IMM GRANULOCYTES # BLD AUTO: 0.66 10*3/MM3 (ref 0–0.05)
IMM GRANULOCYTES # BLD AUTO: 1.11 10*3/MM3 (ref 0–0.05)
IMM GRANULOCYTES NFR BLD AUTO: 1.8 % (ref 0–0.5)
IMM GRANULOCYTES NFR BLD AUTO: 1.8 % (ref 0–0.5)
IMM GRANULOCYTES NFR BLD AUTO: 1.9 % (ref 0–0.5)
IMM GRANULOCYTES NFR BLD AUTO: 2.4 % (ref 0–0.5)
IMM GRANULOCYTES NFR BLD AUTO: 2.5 % (ref 0–0.5)
IMM GRANULOCYTES NFR BLD AUTO: 2.6 % (ref 0–0.5)
IMM GRANULOCYTES NFR BLD AUTO: 2.8 % (ref 0–0.5)
IMM GRANULOCYTES NFR BLD AUTO: 4.7 % (ref 0–0.5)
INHALED O2 CONCENTRATION: 100 %
INHALED O2 CONCENTRATION: 100 %
INHALED O2 CONCENTRATION: 28 %
INHALED O2 CONCENTRATION: 40 %
INHALED O2 CONCENTRATION: 50 %
INHALED O2 CONCENTRATION: 60 %
INHALED O2 CONCENTRATION: 80 %
INHALED O2 CONCENTRATION: 80 %
INR PPP: 1.26 (ref 0.9–1.1)
INR PPP: 1.55 (ref 0.9–1.1)
INSPIRATORY TIME: 1
IRON 24H UR-MRATE: 50 MCG/DL (ref 59–158)
IRON SATN MFR SERPL: 35 % (ref 20–50)
ISOLATED FROM: ABNORMAL
KETONES UR QL STRIP: ABNORMAL
L PNEUMO1 AG UR QL IA: NEGATIVE
LDH SERPL-CCNC: 543 U/L (ref 135–225)
LEFT ATRIUM VOLUME INDEX: 25.5 ML/M2
LEUKOCYTE ESTERASE UR QL STRIP.AUTO: ABNORMAL
LYMPHOCYTES # BLD AUTO: 0.28 10*3/MM3 (ref 0.7–3.1)
LYMPHOCYTES # BLD AUTO: 0.6 10*3/MM3 (ref 0.7–3.1)
LYMPHOCYTES # BLD AUTO: 0.63 10*3/MM3 (ref 0.7–3.1)
LYMPHOCYTES # BLD AUTO: 0.63 10*3/MM3 (ref 0.7–3.1)
LYMPHOCYTES # BLD AUTO: 0.76 10*3/MM3 (ref 0.7–3.1)
LYMPHOCYTES # BLD AUTO: 1 10*3/MM3 (ref 0.7–3.1)
LYMPHOCYTES # BLD AUTO: 1.2 10*3/MM3 (ref 0.7–3.1)
LYMPHOCYTES # BLD AUTO: 1.42 10*3/MM3 (ref 0.7–3.1)
LYMPHOCYTES # BLD MANUAL: 0.15 10*3/MM3 (ref 0.7–3.1)
LYMPHOCYTES # BLD MANUAL: 0.21 10*3/MM3 (ref 0.7–3.1)
LYMPHOCYTES NFR BLD AUTO: 1.3 % (ref 19.6–45.3)
LYMPHOCYTES NFR BLD AUTO: 3.4 % (ref 19.6–45.3)
LYMPHOCYTES NFR BLD AUTO: 3.5 % (ref 19.6–45.3)
LYMPHOCYTES NFR BLD AUTO: 3.6 % (ref 19.6–45.3)
LYMPHOCYTES NFR BLD AUTO: 4.3 % (ref 19.6–45.3)
LYMPHOCYTES NFR BLD AUTO: 4.4 % (ref 19.6–45.3)
LYMPHOCYTES NFR BLD AUTO: 5.1 % (ref 19.6–45.3)
LYMPHOCYTES NFR BLD AUTO: 5.5 % (ref 19.6–45.3)
LYMPHOCYTES NFR BLD MANUAL: 0 % (ref 5–12)
LYMPHOCYTES NFR BLD MANUAL: 1 % (ref 5–12)
Lab: ABNORMAL
M PNEUMO IGG SER IA-ACNC: NOT DETECTED
MACROCYTES BLD QL SMEAR: ABNORMAL
MAGNESIUM SERPL-MCNC: 1.6 MG/DL (ref 1.6–2.4)
MAGNESIUM SERPL-MCNC: 1.7 MG/DL (ref 1.6–2.4)
MAGNESIUM SERPL-MCNC: 1.8 MG/DL (ref 1.6–2.4)
MAGNESIUM SERPL-MCNC: 1.9 MG/DL (ref 1.6–2.4)
MAGNESIUM SERPL-MCNC: 2 MG/DL (ref 1.6–2.4)
MAGNESIUM SERPL-MCNC: 2.1 MG/DL (ref 1.6–2.4)
MAGNESIUM SERPL-MCNC: 2.1 MG/DL (ref 1.6–2.4)
MAGNESIUM SERPL-MCNC: 2.2 MG/DL (ref 1.6–2.4)
MAGNESIUM SERPL-MCNC: 2.3 MG/DL (ref 1.6–2.4)
MAGNESIUM SERPL-MCNC: 2.4 MG/DL (ref 1.6–2.4)
MAGNESIUM SERPL-MCNC: 2.5 MG/DL (ref 1.6–2.4)
MCH RBC QN AUTO: 33.1 PG (ref 26.6–33)
MCH RBC QN AUTO: 33.6 PG (ref 26.6–33)
MCH RBC QN AUTO: 33.6 PG (ref 26.6–33)
MCH RBC QN AUTO: 33.8 PG (ref 26.6–33)
MCH RBC QN AUTO: 33.9 PG (ref 26.6–33)
MCH RBC QN AUTO: 34 PG (ref 26.6–33)
MCH RBC QN AUTO: 34.2 PG (ref 26.6–33)
MCH RBC QN AUTO: 34.3 PG (ref 26.6–33)
MCH RBC QN AUTO: 34.8 PG (ref 26.6–33)
MCH RBC QN AUTO: 34.8 PG (ref 26.6–33)
MCH RBC QN AUTO: 35.1 PG (ref 26.6–33)
MCH RBC QN AUTO: 36.8 PG (ref 26.6–33)
MCH RBC QN AUTO: 41.5 PG (ref 26.6–33)
MCHC RBC AUTO-ENTMCNC: 32.9 G/DL (ref 31.5–35.7)
MCHC RBC AUTO-ENTMCNC: 33 G/DL (ref 31.5–35.7)
MCHC RBC AUTO-ENTMCNC: 33 G/DL (ref 31.5–35.7)
MCHC RBC AUTO-ENTMCNC: 33.2 G/DL (ref 31.5–35.7)
MCHC RBC AUTO-ENTMCNC: 33.5 G/DL (ref 31.5–35.7)
MCHC RBC AUTO-ENTMCNC: 34 G/DL (ref 31.5–35.7)
MCHC RBC AUTO-ENTMCNC: 34.1 G/DL (ref 31.5–35.7)
MCHC RBC AUTO-ENTMCNC: 34.4 G/DL (ref 31.5–35.7)
MCHC RBC AUTO-ENTMCNC: 34.6 G/DL (ref 31.5–35.7)
MCHC RBC AUTO-ENTMCNC: 35.2 G/DL (ref 31.5–35.7)
MCHC RBC AUTO-ENTMCNC: 35.9 G/DL (ref 31.5–35.7)
MCV RBC AUTO: 100.8 FL (ref 79–97)
MCV RBC AUTO: 100.9 FL (ref 79–97)
MCV RBC AUTO: 101.8 FL (ref 79–97)
MCV RBC AUTO: 102.7 FL (ref 79–97)
MCV RBC AUTO: 103.9 FL (ref 79–97)
MCV RBC AUTO: 104.7 FL (ref 79–97)
MCV RBC AUTO: 104.8 FL (ref 79–97)
MCV RBC AUTO: 109.8 FL (ref 79–97)
MCV RBC AUTO: 115.6 FL (ref 79–97)
MCV RBC AUTO: 95.7 FL (ref 79–97)
MCV RBC AUTO: 97.7 FL (ref 79–97)
MCV RBC AUTO: 99.1 FL (ref 79–97)
MCV RBC AUTO: 99.5 FL (ref 79–97)
MODALITY: ABNORMAL
MONOCYTES # BLD AUTO: 0.31 10*3/MM3 (ref 0.1–0.9)
MONOCYTES # BLD AUTO: 0.33 10*3/MM3 (ref 0.1–0.9)
MONOCYTES # BLD AUTO: 0.45 10*3/MM3 (ref 0.1–0.9)
MONOCYTES # BLD AUTO: 0.46 10*3/MM3 (ref 0.1–0.9)
MONOCYTES # BLD AUTO: 0.65 10*3/MM3 (ref 0.1–0.9)
MONOCYTES # BLD AUTO: 0.71 10*3/MM3 (ref 0.1–0.9)
MONOCYTES # BLD AUTO: 1.83 10*3/MM3 (ref 0.1–0.9)
MONOCYTES # BLD AUTO: 1.88 10*3/MM3 (ref 0.1–0.9)
MONOCYTES # BLD: 0 10*3/MM3 (ref 0.1–0.9)
MONOCYTES # BLD: 0.21 10*3/MM3 (ref 0.1–0.9)
MONOCYTES NFR BLD AUTO: 1.8 % (ref 5–12)
MONOCYTES NFR BLD AUTO: 2 % (ref 5–12)
MONOCYTES NFR BLD AUTO: 2.2 % (ref 5–12)
MONOCYTES NFR BLD AUTO: 2.5 % (ref 5–12)
MONOCYTES NFR BLD AUTO: 3.1 % (ref 5–12)
MONOCYTES NFR BLD AUTO: 3.6 % (ref 5–12)
MONOCYTES NFR BLD AUTO: 7.1 % (ref 5–12)
MONOCYTES NFR BLD AUTO: 8 % (ref 5–12)
MRSA DNA SPEC QL NAA+PROBE: NORMAL
NEUTROPHILS # BLD AUTO: 14.53 10*3/MM3 (ref 1.7–7)
NEUTROPHILS # BLD AUTO: 20.79 10*3/MM3 (ref 1.7–7)
NEUTROPHILS NFR BLD AUTO: 15.23 10*3/MM3 (ref 1.7–7)
NEUTROPHILS NFR BLD AUTO: 15.84 10*3/MM3 (ref 1.7–7)
NEUTROPHILS NFR BLD AUTO: 16.32 10*3/MM3 (ref 1.7–7)
NEUTROPHILS NFR BLD AUTO: 16.99 10*3/MM3 (ref 1.7–7)
NEUTROPHILS NFR BLD AUTO: 19.32 10*3/MM3 (ref 1.7–7)
NEUTROPHILS NFR BLD AUTO: 19.57 10*3/MM3 (ref 1.7–7)
NEUTROPHILS NFR BLD AUTO: 20.89 10*3/MM3 (ref 1.7–7)
NEUTROPHILS NFR BLD AUTO: 21.74 10*3/MM3 (ref 1.7–7)
NEUTROPHILS NFR BLD AUTO: 82 % (ref 42.7–76)
NEUTROPHILS NFR BLD AUTO: 84.6 % (ref 42.7–76)
NEUTROPHILS NFR BLD AUTO: 89.8 % (ref 42.7–76)
NEUTROPHILS NFR BLD AUTO: 90 % (ref 42.7–76)
NEUTROPHILS NFR BLD AUTO: 90.6 % (ref 42.7–76)
NEUTROPHILS NFR BLD AUTO: 91.3 % (ref 42.7–76)
NEUTROPHILS NFR BLD AUTO: 91.6 % (ref 42.7–76)
NEUTROPHILS NFR BLD AUTO: 93.5 % (ref 42.7–76)
NEUTROPHILS NFR BLD MANUAL: 98 % (ref 42.7–76)
NEUTROPHILS NFR BLD MANUAL: 99 % (ref 42.7–76)
NITRITE UR QL STRIP: NEGATIVE
NOTIFIED WHO: ABNORMAL
NRBC BLD AUTO-RTO: 0 /100 WBC (ref 0–0.2)
NRBC BLD AUTO-RTO: 0 /100 WBC (ref 0–0.2)
NRBC BLD AUTO-RTO: 0.1 /100 WBC (ref 0–0.2)
NRBC BLD AUTO-RTO: 0.1 /100 WBC (ref 0–0.2)
NRBC BLD AUTO-RTO: 0.4 /100 WBC (ref 0–0.2)
NRBC BLD AUTO-RTO: 0.5 /100 WBC (ref 0–0.2)
NRBC BLD AUTO-RTO: 0.9 /100 WBC (ref 0–0.2)
NT-PROBNP SERPL-MCNC: ABNORMAL PG/ML (ref 0–900)
O2 A-A PPRESDIFF RESPIRATORY: 0.1 MMHG
O2 A-A PPRESDIFF RESPIRATORY: 0.2 MMHG
O2 A-A PPRESDIFF RESPIRATORY: 0.3 MMHG
PAW @ PEAK INSP FLOW SETTING VENT: 24 CMH2O
PAW @ PEAK INSP FLOW SETTING VENT: 26 CMH2O
PCO2 BLDA: 17.2 MM HG (ref 35–45)
PCO2 BLDA: 25.1 MM HG (ref 35–45)
PCO2 BLDA: 25.4 MM HG (ref 35–45)
PCO2 BLDA: 27.8 MM HG (ref 35–45)
PCO2 BLDA: 29.8 MM HG (ref 35–45)
PCO2 BLDA: 33.5 MM HG (ref 35–45)
PCO2 BLDA: 33.6 MM HG (ref 35–45)
PCO2 BLDA: 34.9 MM HG (ref 35–45)
PCO2 BLDA: 42.8 MM HG (ref 35–45)
PCO2 BLDA: 54.4 MM HG (ref 35–45)
PCO2 BLDA: 55.8 MM HG (ref 35–45)
PEEP RESPIRATORY: 5 CM[H2O]
PEEP RESPIRATORY: 8 CM[H2O]
PH BLDA: 7.05 PH UNITS (ref 7.35–7.45)
PH BLDA: 7.11 PH UNITS (ref 7.35–7.45)
PH BLDA: 7.18 PH UNITS (ref 7.35–7.45)
PH BLDA: 7.21 PH UNITS (ref 7.35–7.45)
PH BLDA: 7.22 PH UNITS (ref 7.35–7.45)
PH BLDA: 7.35 PH UNITS (ref 7.35–7.45)
PH BLDA: 7.36 PH UNITS (ref 7.35–7.45)
PH BLDA: 7.38 PH UNITS (ref 7.35–7.45)
PH BLDA: 7.38 PH UNITS (ref 7.35–7.45)
PH BLDA: 7.39 PH UNITS (ref 7.35–7.45)
PH BLDA: 7.44 PH UNITS (ref 7.35–7.45)
PH BLDA: 7.47 PH UNITS (ref 7.35–7.45)
PH BLDA: 7.48 PH UNITS (ref 7.35–7.45)
PH BLDA: 7.6 PH UNITS (ref 7.35–7.45)
PH UR STRIP.AUTO: 5.5 [PH] (ref 5–8)
PHOSPHATE SERPL-MCNC: 2.8 MG/DL (ref 2.5–4.5)
PHOSPHATE SERPL-MCNC: 3.1 MG/DL (ref 2.5–4.5)
PHOSPHATE SERPL-MCNC: 3.3 MG/DL (ref 2.5–4.5)
PHOSPHATE SERPL-MCNC: 3.5 MG/DL (ref 2.5–4.5)
PHOSPHATE SERPL-MCNC: 3.6 MG/DL (ref 2.5–4.5)
PHOSPHATE SERPL-MCNC: 3.7 MG/DL (ref 2.5–4.5)
PHOSPHATE SERPL-MCNC: 3.7 MG/DL (ref 2.5–4.5)
PHOSPHATE SERPL-MCNC: 4.7 MG/DL (ref 2.5–4.5)
PHOSPHATE SERPL-MCNC: 4.7 MG/DL (ref 2.5–4.5)
PHOSPHATE SERPL-MCNC: 5.5 MG/DL (ref 2.5–4.5)
PHOSPHATE SERPL-MCNC: 5.5 MG/DL (ref 2.5–4.5)
PHOSPHATE SERPL-MCNC: 7.1 MG/DL (ref 2.5–4.5)
PHOSPHATE SERPL-MCNC: 7.1 MG/DL (ref 2.5–4.5)
PHOSPHATE SERPL-MCNC: 7.3 MG/DL (ref 2.5–4.5)
PHOSPHATE SERPL-MCNC: 7.4 MG/DL (ref 2.5–4.5)
PHOSPHATE SERPL-MCNC: 7.4 MG/DL (ref 2.5–4.5)
PHOSPHATE SERPL-MCNC: 7.5 MG/DL (ref 2.5–4.5)
PHOSPHATE SERPL-MCNC: 7.6 MG/DL (ref 2.5–4.5)
PHOSPHATE SERPL-MCNC: 8 MG/DL (ref 2.5–4.5)
PHOSPHATE SERPL-MCNC: 8.5 MG/DL (ref 2.5–4.5)
PHOSPHATE SERPL-MCNC: 9 MG/DL (ref 2.5–4.5)
PHOSPHATE SERPL-MCNC: 9.4 MG/DL (ref 2.5–4.5)
PHOSPHATE SERPL-MCNC: 9.5 MG/DL (ref 2.5–4.5)
PHOSPHATE SERPL-MCNC: 9.7 MG/DL (ref 2.5–4.5)
PLAT MORPH BLD: NORMAL
PLAT MORPH BLD: NORMAL
PLATELET # BLD AUTO: 104 10*3/MM3 (ref 140–450)
PLATELET # BLD AUTO: 104 10*3/MM3 (ref 140–450)
PLATELET # BLD AUTO: 124 10*3/MM3 (ref 140–450)
PLATELET # BLD AUTO: 125 10*3/MM3 (ref 140–450)
PLATELET # BLD AUTO: 128 10*3/MM3 (ref 140–450)
PLATELET # BLD AUTO: 133 10*3/MM3 (ref 140–450)
PLATELET # BLD AUTO: 137 10*3/MM3 (ref 140–450)
PLATELET # BLD AUTO: 139 10*3/MM3 (ref 140–450)
PLATELET # BLD AUTO: 142 10*3/MM3 (ref 140–450)
PLATELET # BLD AUTO: 162 10*3/MM3 (ref 140–450)
PLATELET # BLD AUTO: 73 10*3/MM3 (ref 140–450)
PLATELET # BLD AUTO: 79 10*3/MM3 (ref 140–450)
PLATELET # BLD AUTO: 80 10*3/MM3 (ref 140–450)
PLATELET # BLD AUTO: 93 10*3/MM3 (ref 140–450)
PLATELETS.RETICULATED NFR BLD AUTO: 4.4 % (ref 0.9–6.5)
PMV BLD AUTO: 10.1 FL (ref 6–12)
PMV BLD AUTO: 10.1 FL (ref 6–12)
PMV BLD AUTO: 10.3 FL (ref 6–12)
PMV BLD AUTO: 10.4 FL (ref 6–12)
PMV BLD AUTO: 10.5 FL (ref 6–12)
PMV BLD AUTO: 10.5 FL (ref 6–12)
PMV BLD AUTO: 11.1 FL (ref 6–12)
PMV BLD AUTO: 11.1 FL (ref 6–12)
PMV BLD AUTO: 11.5 FL (ref 6–12)
PMV BLD AUTO: 11.8 FL (ref 6–12)
PMV BLD AUTO: 12.1 FL (ref 6–12)
PMV BLD AUTO: 12.1 FL (ref 6–12)
PMV BLD AUTO: 12.2 FL (ref 6–12)
PO2 BLD: 141 MM[HG] (ref 0–500)
PO2 BLD: 151 MM[HG] (ref 0–500)
PO2 BLD: 161 MM[HG] (ref 0–500)
PO2 BLD: 176 MM[HG] (ref 0–500)
PO2 BLD: 182 MM[HG] (ref 0–500)
PO2 BLD: 183 MM[HG] (ref 0–500)
PO2 BLD: 188 MM[HG] (ref 0–500)
PO2 BLD: 188 MM[HG] (ref 0–500)
PO2 BLD: 192 MM[HG] (ref 0–500)
PO2 BLD: 196 MM[HG] (ref 0–500)
PO2 BLD: 200 MM[HG] (ref 0–500)
PO2 BLD: 214 MM[HG] (ref 0–500)
PO2 BLD: 218 MM[HG] (ref 0–500)
PO2 BLD: 51 MM[HG] (ref 0–500)
PO2 BLDA: 108.9 MM HG (ref 80–100)
PO2 BLDA: 112.7 MM HG (ref 80–100)
PO2 BLDA: 128.6 MM HG (ref 80–100)
PO2 BLDA: 150.1 MM HG (ref 80–100)
PO2 BLDA: 191.9 MM HG (ref 80–100)
PO2 BLDA: 50.7 MM HG (ref 80–100)
PO2 BLDA: 60.9 MM HG (ref 80–100)
PO2 BLDA: 64.4 MM HG (ref 80–100)
PO2 BLDA: 78.5 MM HG (ref 80–100)
PO2 BLDA: 79.9 MM HG (ref 80–100)
PO2 BLDA: 88.1 MM HG (ref 80–100)
PO2 BLDA: 90.5 MM HG (ref 80–100)
PO2 BLDA: 91.7 MM HG (ref 80–100)
PO2 BLDA: 93.9 MM HG (ref 80–100)
POTASSIUM SERPL-SCNC: 2.3 MMOL/L (ref 3.5–5.2)
POTASSIUM SERPL-SCNC: 2.6 MMOL/L (ref 3.5–5.2)
POTASSIUM SERPL-SCNC: 2.6 MMOL/L (ref 3.5–5.2)
POTASSIUM SERPL-SCNC: 2.8 MMOL/L (ref 3.5–5.2)
POTASSIUM SERPL-SCNC: 2.8 MMOL/L (ref 3.5–5.2)
POTASSIUM SERPL-SCNC: 3 MMOL/L (ref 3.5–5.2)
POTASSIUM SERPL-SCNC: 3.1 MMOL/L (ref 3.5–5.2)
POTASSIUM SERPL-SCNC: 3.2 MMOL/L (ref 3.5–5.2)
POTASSIUM SERPL-SCNC: 3.5 MMOL/L (ref 3.5–5.2)
POTASSIUM SERPL-SCNC: 3.5 MMOL/L (ref 3.5–5.2)
POTASSIUM SERPL-SCNC: 3.6 MMOL/L (ref 3.5–5.2)
POTASSIUM SERPL-SCNC: 3.7 MMOL/L (ref 3.5–5.2)
POTASSIUM SERPL-SCNC: 3.8 MMOL/L (ref 3.5–5.2)
POTASSIUM SERPL-SCNC: 3.8 MMOL/L (ref 3.5–5.2)
POTASSIUM SERPL-SCNC: 3.9 MMOL/L (ref 3.5–5.2)
POTASSIUM SERPL-SCNC: 4.7 MMOL/L (ref 3.5–5.2)
POTASSIUM SERPL-SCNC: 4.8 MMOL/L (ref 3.5–5.2)
POTASSIUM SERPL-SCNC: 5 MMOL/L (ref 3.5–5.2)
POTASSIUM SERPL-SCNC: 5.3 MMOL/L (ref 3.5–5.2)
POTASSIUM SERPL-SCNC: 5.5 MMOL/L (ref 3.5–5.2)
POTASSIUM SERPL-SCNC: 5.5 MMOL/L (ref 3.5–5.2)
POTASSIUM SERPL-SCNC: 5.6 MMOL/L (ref 3.5–5.2)
POTASSIUM SERPL-SCNC: 5.7 MMOL/L (ref 3.5–5.2)
POTASSIUM SERPL-SCNC: 5.9 MMOL/L (ref 3.5–5.2)
POTASSIUM SERPL-SCNC: 6.1 MMOL/L (ref 3.5–5.2)
POTASSIUM SERPL-SCNC: 6.4 MMOL/L (ref 3.5–5.2)
PROCALCITONIN SERPL-MCNC: 1.41 NG/ML (ref 0–0.25)
PROT SERPL-MCNC: 4.5 G/DL (ref 6–8.5)
PROT SERPL-MCNC: 4.6 G/DL (ref 6–8.5)
PROT SERPL-MCNC: 4.7 G/DL (ref 6–8.5)
PROT SERPL-MCNC: 4.9 G/DL (ref 6–8.5)
PROT SERPL-MCNC: 5.2 G/DL (ref 6–8.5)
PROT SERPL-MCNC: 5.2 G/DL (ref 6–8.5)
PROT SERPL-MCNC: 5.3 G/DL (ref 6–8.5)
PROT SERPL-MCNC: 5.8 G/DL (ref 6–8.5)
PROT UR QL STRIP: ABNORMAL
PROTHROMBIN TIME: 16.1 SECONDS (ref 11.7–14.2)
PROTHROMBIN TIME: 18.8 SECONDS (ref 11.7–14.2)
QT INTERVAL: 365 MS
QT INTERVAL: 387 MS
QT INTERVAL: 391 MS
QT INTERVAL: 428 MS
QTC INTERVAL: 500 MS
QTC INTERVAL: 524 MS
QTC INTERVAL: 526 MS
QTC INTERVAL: 545 MS
RBC # BLD AUTO: 1.35 10*6/MM3 (ref 4.14–5.8)
RBC # BLD AUTO: 1.74 10*6/MM3 (ref 4.14–5.8)
RBC # BLD AUTO: 1.88 10*6/MM3 (ref 4.14–5.8)
RBC # BLD AUTO: 2.15 10*6/MM3 (ref 4.14–5.8)
RBC # BLD AUTO: 2.19 10*6/MM3 (ref 4.14–5.8)
RBC # BLD AUTO: 2.21 10*6/MM3 (ref 4.14–5.8)
RBC # BLD AUTO: 2.21 10*6/MM3 (ref 4.14–5.8)
RBC # BLD AUTO: 2.23 10*6/MM3 (ref 4.14–5.8)
RBC # BLD AUTO: 2.31 10*6/MM3 (ref 4.14–5.8)
RBC # BLD AUTO: 2.33 10*6/MM3 (ref 4.14–5.8)
RBC # BLD AUTO: 2.36 10*6/MM3 (ref 4.14–5.8)
RBC # BLD AUTO: 2.62 10*6/MM3 (ref 4.14–5.8)
RBC # BLD AUTO: 2.81 10*6/MM3 (ref 4.14–5.8)
RBC # UR STRIP: ABNORMAL /HPF
RBC MORPH BLD: NORMAL
READ BACK: YES
REF LAB TEST METHOD: ABNORMAL
RH BLD: POSITIVE
RH BLD: POSITIVE
RHINOVIRUS RNA SPEC NAA+PROBE: NOT DETECTED
RSV RNA NPH QL NAA+NON-PROBE: NOT DETECTED
S PNEUM AG SPEC QL LA: NEGATIVE
SALICYLATES SERPL-MCNC: <0.3 MG/DL
SAO2 % BLDCOA: 69.2 % (ref 92–98.5)
SAO2 % BLDCOA: 91.4 % (ref 92–98.5)
SAO2 % BLDCOA: 94.1 % (ref 92–98.5)
SAO2 % BLDCOA: 95.3 % (ref 92–98.5)
SAO2 % BLDCOA: 96.3 % (ref 92–98.5)
SAO2 % BLDCOA: 96.6 % (ref 92–98.5)
SAO2 % BLDCOA: 96.9 % (ref 92–98.5)
SAO2 % BLDCOA: 97 % (ref 92–98.5)
SAO2 % BLDCOA: 97.1 % (ref 92–98.5)
SAO2 % BLDCOA: 97.1 % (ref 92–98.5)
SAO2 % BLDCOA: 98.4 % (ref 92–98.5)
SAO2 % BLDCOA: 98.8 % (ref 92–98.5)
SAO2 % BLDCOA: 99.1 % (ref 92–98.5)
SAO2 % BLDCOA: 99.3 % (ref 92–98.5)
SARS-COV-2 RNA NPH QL NAA+NON-PROBE: NOT DETECTED
SET MECH RESP RATE: 10
SET MECH RESP RATE: 16
SET MECH RESP RATE: 18
SET MECH RESP RATE: 18
SET MECH RESP RATE: 20
SET MECH RESP RATE: 24
SINUS: 3.2 CM
SODIUM SERPL-SCNC: 133 MMOL/L (ref 136–145)
SODIUM SERPL-SCNC: 134 MMOL/L (ref 136–145)
SODIUM SERPL-SCNC: 135 MMOL/L (ref 136–145)
SODIUM SERPL-SCNC: 136 MMOL/L (ref 136–145)
SODIUM SERPL-SCNC: 136 MMOL/L (ref 136–145)
SODIUM SERPL-SCNC: 137 MMOL/L (ref 136–145)
SODIUM SERPL-SCNC: 138 MMOL/L (ref 136–145)
SODIUM SERPL-SCNC: 139 MMOL/L (ref 136–145)
SODIUM SERPL-SCNC: 139 MMOL/L (ref 136–145)
SODIUM SERPL-SCNC: 140 MMOL/L (ref 136–145)
SODIUM SERPL-SCNC: 141 MMOL/L (ref 136–145)
SODIUM SERPL-SCNC: 142 MMOL/L (ref 136–145)
SODIUM SERPL-SCNC: 145 MMOL/L (ref 136–145)
SODIUM SERPL-SCNC: 147 MMOL/L (ref 136–145)
SODIUM UR-SCNC: 50 MMOL/L
SP GR UR STRIP: 1.01 (ref 1–1.03)
SQUAMOUS #/AREA URNS HPF: ABNORMAL /HPF
STJ: 2.28 CM
T&S EXPIRATION DATE: NORMAL
T&S EXPIRATION DATE: NORMAL
T3FREE SERPL-MCNC: 0.97 PG/ML (ref 2–4.4)
T4 FREE SERPL-MCNC: 0.98 NG/DL (ref 0.92–1.68)
TIBC SERPL-MCNC: 143 MCG/DL (ref 298–536)
TOTAL RATE: 20 BREATHS/MINUTE
TOTAL RATE: 20 BREATHS/MINUTE
TOTAL RATE: 21 BREATHS/MINUTE
TOTAL RATE: 21 BREATHS/MINUTE
TOTAL RATE: 22 BREATHS/MINUTE
TOTAL RATE: 22 BREATHS/MINUTE
TOTAL RATE: 23 BREATHS/MINUTE
TOTAL RATE: 23 BREATHS/MINUTE
TOTAL RATE: 25 BREATHS/MINUTE
TOTAL RATE: 26 BREATHS/MINUTE
TOTAL RATE: 27 BREATHS/MINUTE
TOTAL RATE: 29 BREATHS/MINUTE
TOTAL RATE: 44 BREATHS/MINUTE
TRANSFERRIN SERPL-MCNC: 96 MG/DL (ref 200–360)
TRIGL SERPL-MCNC: 392 MG/DL (ref 0–150)
TRIGL SERPL-MCNC: 402 MG/DL (ref 0–150)
TROPONIN T DELTA: -65 NG/L
TROPONIN T SERPL HS-MCNC: 665 NG/L
TSH SERPL DL<=0.05 MIU/L-ACNC: 0.09 UIU/ML (ref 0.27–4.2)
UNIT  ABO: NORMAL
UNIT  RH: NORMAL
UPPER ARTERIAL RIGHT ARM BRACHIAL SYS MAX: 101
UROBILINOGEN UR QL STRIP: ABNORMAL
VANCOMYCIN SERPL-MCNC: 26.8 MCG/ML (ref 5–40)
VANCOMYCIN TROUGH SERPL-MCNC: 4.4 MCG/ML (ref 5–20)
VANCOMYCIN TROUGH SERPL-MCNC: 9.1 MCG/ML (ref 5–20)
VARIANT LYMPHS NFR BLD MANUAL: 1 % (ref 19.6–45.3)
VARIANT LYMPHS NFR BLD MANUAL: 1 % (ref 19.6–45.3)
VENTILATOR MODE: ABNORMAL
VIT B12 BLD-MCNC: 1370 PG/ML (ref 211–946)
VT ON VENT VENT: 442 ML
VT ON VENT VENT: 505 ML
VT ON VENT VENT: 587 ML
VT ON VENT VENT: 609 ML
VT ON VENT VENT: 611 ML
VT ON VENT VENT: 693 ML
VT ON VENT VENT: 735 ML
WBC # UR STRIP: ABNORMAL /HPF
WBC MORPH BLD: NORMAL
WBC MORPH BLD: NORMAL
WBC NRBC COR # BLD AUTO: 14.68 10*3/MM3 (ref 3.4–10.8)
WBC NRBC COR # BLD AUTO: 16.61 10*3/MM3 (ref 3.4–10.8)
WBC NRBC COR # BLD AUTO: 17.47 10*3/MM3 (ref 3.4–10.8)
WBC NRBC COR # BLD AUTO: 18.12 10*3/MM3 (ref 3.4–10.8)
WBC NRBC COR # BLD AUTO: 18.59 10*3/MM3 (ref 3.4–10.8)
WBC NRBC COR # BLD AUTO: 20.92 10*3/MM3 (ref 3.4–10.8)
WBC NRBC COR # BLD AUTO: 21.21 10*3/MM3 (ref 3.4–10.8)
WBC NRBC COR # BLD AUTO: 22.43 10*3/MM3 (ref 3.4–10.8)
WBC NRBC COR # BLD AUTO: 22.71 10*3/MM3 (ref 3.4–10.8)
WBC NRBC COR # BLD AUTO: 23.25 10*3/MM3 (ref 3.4–10.8)
WBC NRBC COR # BLD AUTO: 23.55 10*3/MM3 (ref 3.4–10.8)
WBC NRBC COR # BLD AUTO: 25.7 10*3/MM3 (ref 3.4–10.8)
WBC NRBC COR # BLD AUTO: 25.96 10*3/MM3 (ref 3.4–10.8)

## 2024-01-01 PROCEDURE — 93005 ELECTROCARDIOGRAM TRACING: CPT

## 2024-01-01 PROCEDURE — 83010 ASSAY OF HAPTOGLOBIN QUANT: CPT | Performed by: INTERNAL MEDICINE

## 2024-01-01 PROCEDURE — 82803 BLOOD GASES ANY COMBINATION: CPT | Performed by: HOSPITALIST

## 2024-01-01 PROCEDURE — 71250 CT THORAX DX C-: CPT

## 2024-01-01 PROCEDURE — 25010000002 VANCOMYCIN 10 G RECONSTITUTED SOLUTION: Performed by: INTERNAL MEDICINE

## 2024-01-01 PROCEDURE — 84100 ASSAY OF PHOSPHORUS: CPT | Performed by: INTERNAL MEDICINE

## 2024-01-01 PROCEDURE — 82272 OCCULT BLD FECES 1-3 TESTS: CPT

## 2024-01-01 PROCEDURE — 25010000002 GLYCOPYRROLATE 0.2 MG/ML SOLUTION: Performed by: INTERNAL MEDICINE

## 2024-01-01 PROCEDURE — 83735 ASSAY OF MAGNESIUM: CPT | Performed by: HOSPITALIST

## 2024-01-01 PROCEDURE — 36600 WITHDRAWAL OF ARTERIAL BLOOD: CPT

## 2024-01-01 PROCEDURE — 94799 UNLISTED PULMONARY SVC/PX: CPT

## 2024-01-01 PROCEDURE — 82010 KETONE BODYS QUAN: CPT | Performed by: INTERNAL MEDICINE

## 2024-01-01 PROCEDURE — 25010000002 FENTANYL CITRATE (PF) 50 MCG/ML SOLUTION: Performed by: INTERNAL MEDICINE

## 2024-01-01 PROCEDURE — 25010000002 VANCOMYCIN 750 MG RECONSTITUTED SOLUTION 1 EACH VIAL: Performed by: INTERNAL MEDICINE

## 2024-01-01 PROCEDURE — 86850 RBC ANTIBODY SCREEN: CPT | Performed by: INTERNAL MEDICINE

## 2024-01-01 PROCEDURE — 87181 SC STD AGAR DILUTION PER AGT: CPT | Performed by: EMERGENCY MEDICINE

## 2024-01-01 PROCEDURE — 25010000002 METRONIDAZOLE 500 MG/100ML SOLUTION: Performed by: INTERNAL MEDICINE

## 2024-01-01 PROCEDURE — 94761 N-INVAS EAR/PLS OXIMETRY MLT: CPT

## 2024-01-01 PROCEDURE — 85027 COMPLETE CBC AUTOMATED: CPT | Performed by: INTERNAL MEDICINE

## 2024-01-01 PROCEDURE — 86923 COMPATIBILITY TEST ELECTRIC: CPT

## 2024-01-01 PROCEDURE — 83735 ASSAY OF MAGNESIUM: CPT | Performed by: STUDENT IN AN ORGANIZED HEALTH CARE EDUCATION/TRAINING PROGRAM

## 2024-01-01 PROCEDURE — P9016 RBC LEUKOCYTES REDUCED: HCPCS

## 2024-01-01 PROCEDURE — 80179 DRUG ASSAY SALICYLATE: CPT | Performed by: INTERNAL MEDICINE

## 2024-01-01 PROCEDURE — 25010000002 HEPARIN (PORCINE) PER 1000 UNITS: Performed by: INTERNAL MEDICINE

## 2024-01-01 PROCEDURE — 0BH17EZ INSERTION OF ENDOTRACHEAL AIRWAY INTO TRACHEA, VIA NATURAL OR ARTIFICIAL OPENING: ICD-10-PCS | Performed by: INTERNAL MEDICINE

## 2024-01-01 PROCEDURE — 85018 HEMOGLOBIN: CPT | Performed by: INTERNAL MEDICINE

## 2024-01-01 PROCEDURE — 94003 VENT MGMT INPAT SUBQ DAY: CPT

## 2024-01-01 PROCEDURE — 76705 ECHO EXAM OF ABDOMEN: CPT

## 2024-01-01 PROCEDURE — 25010000002 PROPOFOL 10 MG/ML EMULSION: Performed by: INTERNAL MEDICINE

## 2024-01-01 PROCEDURE — 82803 BLOOD GASES ANY COMBINATION: CPT

## 2024-01-01 PROCEDURE — 86901 BLOOD TYPING SEROLOGIC RH(D): CPT | Performed by: EMERGENCY MEDICINE

## 2024-01-01 PROCEDURE — 93010 ELECTROCARDIOGRAM REPORT: CPT | Performed by: INTERNAL MEDICINE

## 2024-01-01 PROCEDURE — 87205 SMEAR GRAM STAIN: CPT | Performed by: INTERNAL MEDICINE

## 2024-01-01 PROCEDURE — 82306 VITAMIN D 25 HYDROXY: CPT | Performed by: STUDENT IN AN ORGANIZED HEALTH CARE EDUCATION/TRAINING PROGRAM

## 2024-01-01 PROCEDURE — 83735 ASSAY OF MAGNESIUM: CPT | Performed by: INTERNAL MEDICINE

## 2024-01-01 PROCEDURE — 85014 HEMATOCRIT: CPT | Performed by: INTERNAL MEDICINE

## 2024-01-01 PROCEDURE — P9612 CATHETERIZE FOR URINE SPEC: HCPCS

## 2024-01-01 PROCEDURE — 25010000002 METRONIDAZOLE 500 MG/100ML SOLUTION: Performed by: STUDENT IN AN ORGANIZED HEALTH CARE EDUCATION/TRAINING PROGRAM

## 2024-01-01 PROCEDURE — 85025 COMPLETE CBC W/AUTO DIFF WBC: CPT | Performed by: STUDENT IN AN ORGANIZED HEALTH CARE EDUCATION/TRAINING PROGRAM

## 2024-01-01 PROCEDURE — 36600 WITHDRAWAL OF ARTERIAL BLOOD: CPT | Performed by: HOSPITALIST

## 2024-01-01 PROCEDURE — 36600 WITHDRAWAL OF ARTERIAL BLOOD: CPT | Performed by: INTERNAL MEDICINE

## 2024-01-01 PROCEDURE — 87040 BLOOD CULTURE FOR BACTERIA: CPT | Performed by: INTERNAL MEDICINE

## 2024-01-01 PROCEDURE — 25810000003 SODIUM CHLORIDE 0.9 % SOLUTION 250 ML FLEX CONT: Performed by: INTERNAL MEDICINE

## 2024-01-01 PROCEDURE — 93005 ELECTROCARDIOGRAM TRACING: CPT | Performed by: INTERNAL MEDICINE

## 2024-01-01 PROCEDURE — 84478 ASSAY OF TRIGLYCERIDES: CPT | Performed by: INTERNAL MEDICINE

## 2024-01-01 PROCEDURE — 25010000002 CALCIUM GLUCONATE-NACL 1-0.675 GM/50ML-% SOLUTION: Performed by: HOSPITALIST

## 2024-01-01 PROCEDURE — 99232 SBSQ HOSP IP/OBS MODERATE 35: CPT | Performed by: STUDENT IN AN ORGANIZED HEALTH CARE EDUCATION/TRAINING PROGRAM

## 2024-01-01 PROCEDURE — 87186 SC STD MICRODIL/AGAR DIL: CPT | Performed by: EMERGENCY MEDICINE

## 2024-01-01 PROCEDURE — 93922 UPR/L XTREMITY ART 2 LEVELS: CPT

## 2024-01-01 PROCEDURE — 93923 UPR/LXTR ART STDY 3+ LVLS: CPT | Performed by: SURGERY

## 2024-01-01 PROCEDURE — 25010000002 HEPARIN LOCK FLUSH PER 10 UNITS: Performed by: INTERNAL MEDICINE

## 2024-01-01 PROCEDURE — 99233 SBSQ HOSP IP/OBS HIGH 50: CPT | Performed by: STUDENT IN AN ORGANIZED HEALTH CARE EDUCATION/TRAINING PROGRAM

## 2024-01-01 PROCEDURE — 82570 ASSAY OF URINE CREATININE: CPT | Performed by: INTERNAL MEDICINE

## 2024-01-01 PROCEDURE — 87641 MR-STAPH DNA AMP PROBE: CPT | Performed by: INTERNAL MEDICINE

## 2024-01-01 PROCEDURE — 94664 DEMO&/EVAL PT USE INHALER: CPT

## 2024-01-01 PROCEDURE — 25810000003 SODIUM CHLORIDE 0.9 % SOLUTION: Performed by: INTERNAL MEDICINE

## 2024-01-01 PROCEDURE — 82948 REAGENT STRIP/BLOOD GLUCOSE: CPT

## 2024-01-01 PROCEDURE — 84100 ASSAY OF PHOSPHORUS: CPT | Performed by: PHYSICIAN ASSISTANT

## 2024-01-01 PROCEDURE — 87340 HEPATITIS B SURFACE AG IA: CPT | Performed by: INTERNAL MEDICINE

## 2024-01-01 PROCEDURE — 82550 ASSAY OF CK (CPK): CPT | Performed by: INTERNAL MEDICINE

## 2024-01-01 PROCEDURE — 93306 TTE W/DOPPLER COMPLETE: CPT | Performed by: INTERNAL MEDICINE

## 2024-01-01 PROCEDURE — 82330 ASSAY OF CALCIUM: CPT | Performed by: INTERNAL MEDICINE

## 2024-01-01 PROCEDURE — 85025 COMPLETE CBC W/AUTO DIFF WBC: CPT

## 2024-01-01 PROCEDURE — 25010000002 LORAZEPAM PER 2 MG: Performed by: STUDENT IN AN ORGANIZED HEALTH CARE EDUCATION/TRAINING PROGRAM

## 2024-01-01 PROCEDURE — 25010000002 POTASSIUM CHLORIDE 10 MEQ/100ML SOLUTION: Performed by: INTERNAL MEDICINE

## 2024-01-01 PROCEDURE — 86900 BLOOD TYPING SEROLOGIC ABO: CPT | Performed by: EMERGENCY MEDICINE

## 2024-01-01 PROCEDURE — 25010000002 CEFEPIME PER 500 MG: Performed by: INTERNAL MEDICINE

## 2024-01-01 PROCEDURE — 25010000002 LIDOCAINE 1 % SOLUTION: Performed by: RADIOLOGY

## 2024-01-01 PROCEDURE — 36415 COLL VENOUS BLD VENIPUNCTURE: CPT | Performed by: INTERNAL MEDICINE

## 2024-01-01 PROCEDURE — 25010000002 LORAZEPAM PER 2 MG: Performed by: INTERNAL MEDICINE

## 2024-01-01 PROCEDURE — 84439 ASSAY OF FREE THYROXINE: CPT | Performed by: EMERGENCY MEDICINE

## 2024-01-01 PROCEDURE — 25010000002 MAGNESIUM SULFATE 2 GM/50ML SOLUTION: Performed by: EMERGENCY MEDICINE

## 2024-01-01 PROCEDURE — 83880 ASSAY OF NATRIURETIC PEPTIDE: CPT | Performed by: EMERGENCY MEDICINE

## 2024-01-01 PROCEDURE — 87015 SPECIMEN INFECT AGNT CONCNTJ: CPT | Performed by: STUDENT IN AN ORGANIZED HEALTH CARE EDUCATION/TRAINING PROGRAM

## 2024-01-01 PROCEDURE — 84443 ASSAY THYROID STIM HORMONE: CPT | Performed by: EMERGENCY MEDICINE

## 2024-01-01 PROCEDURE — 82330 ASSAY OF CALCIUM: CPT | Performed by: HOSPITALIST

## 2024-01-01 PROCEDURE — 71045 X-RAY EXAM CHEST 1 VIEW: CPT

## 2024-01-01 PROCEDURE — 83735 ASSAY OF MAGNESIUM: CPT | Performed by: EMERGENCY MEDICINE

## 2024-01-01 PROCEDURE — 94002 VENT MGMT INPAT INIT DAY: CPT

## 2024-01-01 PROCEDURE — 83605 ASSAY OF LACTIC ACID: CPT | Performed by: INTERNAL MEDICINE

## 2024-01-01 PROCEDURE — 93306 TTE W/DOPPLER COMPLETE: CPT

## 2024-01-01 PROCEDURE — 87070 CULTURE OTHR SPECIMN AEROBIC: CPT | Performed by: INTERNAL MEDICINE

## 2024-01-01 PROCEDURE — 85610 PROTHROMBIN TIME: CPT

## 2024-01-01 PROCEDURE — 85610 PROTHROMBIN TIME: CPT | Performed by: INTERNAL MEDICINE

## 2024-01-01 PROCEDURE — 93923 UPR/LXTR ART STDY 3+ LVLS: CPT

## 2024-01-01 PROCEDURE — 83615 LACTATE (LD) (LDH) ENZYME: CPT | Performed by: INTERNAL MEDICINE

## 2024-01-01 PROCEDURE — 80202 ASSAY OF VANCOMYCIN: CPT | Performed by: INTERNAL MEDICINE

## 2024-01-01 PROCEDURE — 99233 SBSQ HOSP IP/OBS HIGH 50: CPT | Performed by: INTERNAL MEDICINE

## 2024-01-01 PROCEDURE — 25010000002 CALCIUM GLUCONATE 2-0.675 GM/100ML-% SOLUTION: Performed by: HOSPITALIST

## 2024-01-01 PROCEDURE — 25010000002 VASOPRESSIN 20 UNIT/ML SOLUTION: Performed by: INTERNAL MEDICINE

## 2024-01-01 PROCEDURE — 0202U NFCT DS 22 TRGT SARS-COV-2: CPT | Performed by: NURSE PRACTITIONER

## 2024-01-01 PROCEDURE — 25010000002 THIAMINE HCL 200 MG/2ML SOLUTION: Performed by: INTERNAL MEDICINE

## 2024-01-01 PROCEDURE — 85025 COMPLETE CBC W/AUTO DIFF WBC: CPT | Performed by: INTERNAL MEDICINE

## 2024-01-01 PROCEDURE — 25010000002 MORPHINE PER 10 MG: Performed by: INTERNAL MEDICINE

## 2024-01-01 PROCEDURE — 82248 BILIRUBIN DIRECT: CPT | Performed by: INTERNAL MEDICINE

## 2024-01-01 PROCEDURE — 87086 URINE CULTURE/COLONY COUNT: CPT | Performed by: EMERGENCY MEDICINE

## 2024-01-01 PROCEDURE — 83735 ASSAY OF MAGNESIUM: CPT

## 2024-01-01 PROCEDURE — 25010000002 CEFTRIAXONE PER 250 MG: Performed by: PHYSICIAN ASSISTANT

## 2024-01-01 PROCEDURE — 86901 BLOOD TYPING SEROLOGIC RH(D): CPT | Performed by: INTERNAL MEDICINE

## 2024-01-01 PROCEDURE — 25010000002 PHENYLEPHRINE 10 MG/ML SOLUTION: Performed by: INTERNAL MEDICINE

## 2024-01-01 PROCEDURE — 80053 COMPREHEN METABOLIC PANEL: CPT | Performed by: INTERNAL MEDICINE

## 2024-01-01 PROCEDURE — 70450 CT HEAD/BRAIN W/O DYE: CPT

## 2024-01-01 PROCEDURE — 80053 COMPREHEN METABOLIC PANEL: CPT | Performed by: EMERGENCY MEDICINE

## 2024-01-01 PROCEDURE — 81001 URINALYSIS AUTO W/SCOPE: CPT | Performed by: EMERGENCY MEDICINE

## 2024-01-01 PROCEDURE — 82550 ASSAY OF CK (CPK): CPT | Performed by: HOSPITALIST

## 2024-01-01 PROCEDURE — 93005 ELECTROCARDIOGRAM TRACING: CPT | Performed by: EMERGENCY MEDICINE

## 2024-01-01 PROCEDURE — 82747 ASSAY OF FOLIC ACID RBC: CPT | Performed by: INTERNAL MEDICINE

## 2024-01-01 PROCEDURE — 86900 BLOOD TYPING SEROLOGIC ABO: CPT

## 2024-01-01 PROCEDURE — 82728 ASSAY OF FERRITIN: CPT | Performed by: NURSE PRACTITIONER

## 2024-01-01 PROCEDURE — 99223 1ST HOSP IP/OBS HIGH 75: CPT | Performed by: STUDENT IN AN ORGANIZED HEALTH CARE EDUCATION/TRAINING PROGRAM

## 2024-01-01 PROCEDURE — 87205 SMEAR GRAM STAIN: CPT | Performed by: STUDENT IN AN ORGANIZED HEALTH CARE EDUCATION/TRAINING PROGRAM

## 2024-01-01 PROCEDURE — 02HV33Z INSERTION OF INFUSION DEVICE INTO SUPERIOR VENA CAVA, PERCUTANEOUS APPROACH: ICD-10-PCS | Performed by: INTERNAL MEDICINE

## 2024-01-01 PROCEDURE — 87449 NOS EACH ORGANISM AG IA: CPT | Performed by: NURSE PRACTITIONER

## 2024-01-01 PROCEDURE — 84300 ASSAY OF URINE SODIUM: CPT | Performed by: INTERNAL MEDICINE

## 2024-01-01 PROCEDURE — 85025 COMPLETE CBC W/AUTO DIFF WBC: CPT | Performed by: EMERGENCY MEDICINE

## 2024-01-01 PROCEDURE — 82330 ASSAY OF CALCIUM: CPT

## 2024-01-01 PROCEDURE — 73070 X-RAY EXAM OF ELBOW: CPT

## 2024-01-01 PROCEDURE — 5A1955Z RESPIRATORY VENTILATION, GREATER THAN 96 CONSECUTIVE HOURS: ICD-10-PCS | Performed by: INTERNAL MEDICINE

## 2024-01-01 PROCEDURE — 80069 RENAL FUNCTION PANEL: CPT | Performed by: INTERNAL MEDICINE

## 2024-01-01 PROCEDURE — 85007 BL SMEAR W/DIFF WBC COUNT: CPT | Performed by: EMERGENCY MEDICINE

## 2024-01-01 PROCEDURE — 86900 BLOOD TYPING SEROLOGIC ABO: CPT | Performed by: INTERNAL MEDICINE

## 2024-01-01 PROCEDURE — 25010000002 BUMETANIDE PER 0.5 MG: Performed by: INTERNAL MEDICINE

## 2024-01-01 PROCEDURE — 87147 CULTURE TYPE IMMUNOLOGIC: CPT | Performed by: INTERNAL MEDICINE

## 2024-01-01 PROCEDURE — 87075 CULTR BACTERIA EXCEPT BLOOD: CPT | Performed by: STUDENT IN AN ORGANIZED HEALTH CARE EDUCATION/TRAINING PROGRAM

## 2024-01-01 PROCEDURE — 36430 TRANSFUSION BLD/BLD COMPNT: CPT

## 2024-01-01 PROCEDURE — 76775 US EXAM ABDO BACK WALL LIM: CPT

## 2024-01-01 PROCEDURE — 84484 ASSAY OF TROPONIN QUANT: CPT | Performed by: EMERGENCY MEDICINE

## 2024-01-01 PROCEDURE — 99285 EMERGENCY DEPT VISIT HI MDM: CPT

## 2024-01-01 PROCEDURE — 0F9430Z DRAINAGE OF GALLBLADDER WITH DRAINAGE DEVICE, PERCUTANEOUS APPROACH: ICD-10-PCS | Performed by: RADIOLOGY

## 2024-01-01 PROCEDURE — 85027 COMPLETE CBC AUTOMATED: CPT | Performed by: STUDENT IN AN ORGANIZED HEALTH CARE EDUCATION/TRAINING PROGRAM

## 2024-01-01 PROCEDURE — 5A1D90Z PERFORMANCE OF URINARY FILTRATION, CONTINUOUS, GREATER THAN 18 HOURS PER DAY: ICD-10-PCS | Performed by: INTERNAL MEDICINE

## 2024-01-01 PROCEDURE — 84100 ASSAY OF PHOSPHORUS: CPT

## 2024-01-01 PROCEDURE — 80048 BASIC METABOLIC PNL TOTAL CA: CPT | Performed by: INTERNAL MEDICINE

## 2024-01-01 PROCEDURE — 25010000002 CEFTRIAXONE PER 250 MG: Performed by: STUDENT IN AN ORGANIZED HEALTH CARE EDUCATION/TRAINING PROGRAM

## 2024-01-01 PROCEDURE — 99232 SBSQ HOSP IP/OBS MODERATE 35: CPT | Performed by: INTERNAL MEDICINE

## 2024-01-01 PROCEDURE — 80053 COMPREHEN METABOLIC PANEL: CPT

## 2024-01-01 PROCEDURE — 25010000002 POTASSIUM CHLORIDE 10 MEQ/100ML SOLUTION: Performed by: EMERGENCY MEDICINE

## 2024-01-01 PROCEDURE — 83605 ASSAY OF LACTIC ACID: CPT | Performed by: EMERGENCY MEDICINE

## 2024-01-01 PROCEDURE — 94760 N-INVAS EAR/PLS OXIMETRY 1: CPT

## 2024-01-01 PROCEDURE — 84145 PROCALCITONIN (PCT): CPT | Performed by: EMERGENCY MEDICINE

## 2024-01-01 PROCEDURE — 82746 ASSAY OF FOLIC ACID SERUM: CPT | Performed by: NURSE PRACTITIONER

## 2024-01-01 PROCEDURE — 83540 ASSAY OF IRON: CPT | Performed by: NURSE PRACTITIONER

## 2024-01-01 PROCEDURE — 63710000001 INSULIN REGULAR HUMAN PER 5 UNITS

## 2024-01-01 PROCEDURE — 49406 IMAGE CATH FLUID PERI/RETRO: CPT

## 2024-01-01 PROCEDURE — 25010000002 THIAMINE PER 100 MG: Performed by: EMERGENCY MEDICINE

## 2024-01-01 PROCEDURE — 82306 VITAMIN D 25 HYDROXY: CPT | Performed by: HOSPITALIST

## 2024-01-01 PROCEDURE — 84100 ASSAY OF PHOSPHORUS: CPT | Performed by: EMERGENCY MEDICINE

## 2024-01-01 PROCEDURE — 80202 ASSAY OF VANCOMYCIN: CPT | Performed by: STUDENT IN AN ORGANIZED HEALTH CARE EDUCATION/TRAINING PROGRAM

## 2024-01-01 PROCEDURE — 25810000003 SODIUM CHLORIDE 0.9 % SOLUTION: Performed by: HOSPITALIST

## 2024-01-01 PROCEDURE — 85027 COMPLETE CBC AUTOMATED: CPT | Performed by: NURSE PRACTITIONER

## 2024-01-01 PROCEDURE — 25010000002 THIAMINE HCL 200 MG/2ML SOLUTION: Performed by: STUDENT IN AN ORGANIZED HEALTH CARE EDUCATION/TRAINING PROGRAM

## 2024-01-01 PROCEDURE — 25810000003 SODIUM CHLORIDE 0.9 % SOLUTION: Performed by: EMERGENCY MEDICINE

## 2024-01-01 PROCEDURE — 94640 AIRWAY INHALATION TREATMENT: CPT

## 2024-01-01 PROCEDURE — 84100 ASSAY OF PHOSPHORUS: CPT | Performed by: STUDENT IN AN ORGANIZED HEALTH CARE EDUCATION/TRAINING PROGRAM

## 2024-01-01 PROCEDURE — 82607 VITAMIN B-12: CPT | Performed by: STUDENT IN AN ORGANIZED HEALTH CARE EDUCATION/TRAINING PROGRAM

## 2024-01-01 PROCEDURE — 85007 BL SMEAR W/DIFF WBC COUNT: CPT

## 2024-01-01 PROCEDURE — 85055 RETICULATED PLATELET ASSAY: CPT | Performed by: INTERNAL MEDICINE

## 2024-01-01 PROCEDURE — 86901 BLOOD TYPING SEROLOGIC RH(D): CPT

## 2024-01-01 PROCEDURE — 87070 CULTURE OTHR SPECIMN AEROBIC: CPT | Performed by: STUDENT IN AN ORGANIZED HEALTH CARE EDUCATION/TRAINING PROGRAM

## 2024-01-01 PROCEDURE — 25010000002 HEPARIN (PORCINE) PER 1000 UNITS: Performed by: HOSPITALIST

## 2024-01-01 PROCEDURE — 84481 FREE ASSAY (FT-3): CPT | Performed by: STUDENT IN AN ORGANIZED HEALTH CARE EDUCATION/TRAINING PROGRAM

## 2024-01-01 PROCEDURE — 87154 CUL TYP ID BLD PTHGN 6+ TRGT: CPT | Performed by: INTERNAL MEDICINE

## 2024-01-01 PROCEDURE — 74176 CT ABD & PELVIS W/O CONTRAST: CPT

## 2024-01-01 PROCEDURE — 87040 BLOOD CULTURE FOR BACTERIA: CPT | Performed by: EMERGENCY MEDICINE

## 2024-01-01 PROCEDURE — 99222 1ST HOSP IP/OBS MODERATE 55: CPT | Performed by: INTERNAL MEDICINE

## 2024-01-01 PROCEDURE — 86850 RBC ANTIBODY SCREEN: CPT | Performed by: EMERGENCY MEDICINE

## 2024-01-01 PROCEDURE — 84466 ASSAY OF TRANSFERRIN: CPT | Performed by: NURSE PRACTITIONER

## 2024-01-01 PROCEDURE — 25010000002 THIAMINE PER 100 MG: Performed by: STUDENT IN AN ORGANIZED HEALTH CARE EDUCATION/TRAINING PROGRAM

## 2024-01-01 PROCEDURE — 99222 1ST HOSP IP/OBS MODERATE 55: CPT | Performed by: STUDENT IN AN ORGANIZED HEALTH CARE EDUCATION/TRAINING PROGRAM

## 2024-01-01 PROCEDURE — 25510000001 PERFLUTREN 6.52 MG/ML SUSPENSION 2 ML VIAL: Performed by: INTERNAL MEDICINE

## 2024-01-01 PROCEDURE — C1729 CATH, DRAINAGE: HCPCS

## 2024-01-01 PROCEDURE — 99223 1ST HOSP IP/OBS HIGH 75: CPT | Performed by: INTERNAL MEDICINE

## 2024-01-01 PROCEDURE — 25810000003 SODIUM CHLORIDE 0.9 % SOLUTION: Performed by: NURSE PRACTITIONER

## 2024-01-01 PROCEDURE — 25810000003 LACTATED RINGERS PER 1000 ML: Performed by: INTERNAL MEDICINE

## 2024-01-01 PROCEDURE — 80069 RENAL FUNCTION PANEL: CPT | Performed by: HOSPITALIST

## 2024-01-01 PROCEDURE — 82803 BLOOD GASES ANY COMBINATION: CPT | Performed by: INTERNAL MEDICINE

## 2024-01-01 RX ORDER — LORAZEPAM 1 MG/1
1 TABLET ORAL
Status: ACTIVE | OUTPATIENT
Start: 2024-01-01 | End: 2024-01-01

## 2024-01-01 RX ORDER — POTASSIUM CHLORIDE 7.45 MG/ML
10 INJECTION INTRAVENOUS ONCE
Status: COMPLETED | OUTPATIENT
Start: 2024-01-01 | End: 2024-01-01

## 2024-01-01 RX ORDER — LORAZEPAM 1 MG/1
1 TABLET ORAL EVERY 6 HOURS
Status: DISCONTINUED | OUTPATIENT
Start: 2024-01-01 | End: 2024-01-01

## 2024-01-01 RX ORDER — LORAZEPAM 2 MG/ML
0.5 INJECTION INTRAMUSCULAR
Status: DISCONTINUED | OUTPATIENT
Start: 2024-01-01 | End: 2024-01-01 | Stop reason: HOSPADM

## 2024-01-01 RX ORDER — LORAZEPAM 2 MG/ML
2 INJECTION INTRAMUSCULAR
Status: DISCONTINUED | OUTPATIENT
Start: 2024-01-01 | End: 2024-01-01 | Stop reason: HOSPADM

## 2024-01-01 RX ORDER — CALCIUM GLUCONATE 94 MG/ML
1 INJECTION, SOLUTION INTRAVENOUS ONCE
Status: DISCONTINUED | OUTPATIENT
Start: 2024-01-01 | End: 2024-01-01

## 2024-01-01 RX ORDER — HEPARIN SODIUM 1000 [USP'U]/ML
INJECTION, SOLUTION INTRAVENOUS; SUBCUTANEOUS AS NEEDED
Status: DISCONTINUED | OUTPATIENT
Start: 2024-01-01 | End: 2024-01-01

## 2024-01-01 RX ORDER — ACETAMINOPHEN 325 MG/1
650 TABLET ORAL EVERY 4 HOURS PRN
Status: DISCONTINUED | OUTPATIENT
Start: 2024-01-01 | End: 2024-01-01 | Stop reason: HOSPADM

## 2024-01-01 RX ORDER — LORAZEPAM 1 MG/1
1 TABLET ORAL DAILY
Status: DISCONTINUED | OUTPATIENT
Start: 2024-01-01 | End: 2024-01-01

## 2024-01-01 RX ORDER — POTASSIUM CHLORIDE 1.5 G/1.58G
40 POWDER, FOR SOLUTION ORAL ONCE
Status: COMPLETED | OUTPATIENT
Start: 2024-01-01 | End: 2024-01-01

## 2024-01-01 RX ORDER — ACETAMINOPHEN 160 MG/5ML
650 SOLUTION ORAL EVERY 4 HOURS PRN
Status: DISCONTINUED | OUTPATIENT
Start: 2024-01-01 | End: 2024-01-01 | Stop reason: HOSPADM

## 2024-01-01 RX ORDER — ONDANSETRON 2 MG/ML
4 INJECTION INTRAMUSCULAR; INTRAVENOUS EVERY 6 HOURS PRN
Status: DISCONTINUED | OUTPATIENT
Start: 2024-01-01 | End: 2024-01-01

## 2024-01-01 RX ORDER — LORAZEPAM 2 MG/ML
1 INJECTION INTRAMUSCULAR EVERY 6 HOURS
Status: DISPENSED | OUTPATIENT
Start: 2024-01-01 | End: 2024-01-01

## 2024-01-01 RX ORDER — PANTOPRAZOLE SODIUM 40 MG/10ML
40 INJECTION, POWDER, LYOPHILIZED, FOR SOLUTION INTRAVENOUS
Status: DISCONTINUED | OUTPATIENT
Start: 2024-01-01 | End: 2024-01-01

## 2024-01-01 RX ORDER — GLYCOPYRROLATE 0.2 MG/ML
0.4 INJECTION INTRAMUSCULAR; INTRAVENOUS
Status: DISCONTINUED | OUTPATIENT
Start: 2024-01-01 | End: 2024-01-01 | Stop reason: HOSPADM

## 2024-01-01 RX ORDER — CITALOPRAM HYDROBROMIDE 40 MG/1
40 TABLET ORAL DAILY
COMMUNITY
Start: 2024-01-01 | End: 2025-07-16

## 2024-01-01 RX ORDER — POTASSIUM CHLORIDE 7.45 MG/ML
10 INJECTION INTRAVENOUS
Status: DISPENSED | OUTPATIENT
Start: 2024-01-01 | End: 2024-01-01

## 2024-01-01 RX ORDER — METRONIDAZOLE 500 MG/100ML
500 INJECTION, SOLUTION INTRAVENOUS EVERY 8 HOURS
Status: DISCONTINUED | OUTPATIENT
Start: 2024-01-01 | End: 2024-01-01

## 2024-01-01 RX ORDER — POTASSIUM CHLORIDE 750 MG/1
40 TABLET, FILM COATED, EXTENDED RELEASE ORAL ONCE
Status: COMPLETED | OUTPATIENT
Start: 2024-01-01 | End: 2024-01-01

## 2024-01-01 RX ORDER — LORAZEPAM 2 MG/ML
1 INJECTION INTRAMUSCULAR
Status: DISPENSED | OUTPATIENT
Start: 2024-01-01 | End: 2024-01-01

## 2024-01-01 RX ORDER — HYDROMORPHONE HYDROCHLORIDE 1 MG/ML
0.5 INJECTION, SOLUTION INTRAMUSCULAR; INTRAVENOUS; SUBCUTANEOUS
Status: DISCONTINUED | OUTPATIENT
Start: 2024-01-01 | End: 2024-01-01 | Stop reason: HOSPADM

## 2024-01-01 RX ORDER — VANCOMYCIN/0.9 % SOD CHLORIDE 1.5G/250ML
1500 PLASTIC BAG, INJECTION (ML) INTRAVENOUS ONCE
Status: COMPLETED | OUTPATIENT
Start: 2024-01-01 | End: 2024-01-01

## 2024-01-01 RX ORDER — ALUMINA, MAGNESIA, AND SIMETHICONE 2400; 2400; 240 MG/30ML; MG/30ML; MG/30ML
15 SUSPENSION ORAL EVERY 6 HOURS PRN
Status: DISCONTINUED | OUTPATIENT
Start: 2024-01-01 | End: 2024-01-01

## 2024-01-01 RX ORDER — GLYCOPYRROLATE 0.2 MG/ML
0.4 INJECTION INTRAMUSCULAR; INTRAVENOUS ONCE
Status: COMPLETED | OUTPATIENT
Start: 2024-01-01 | End: 2024-01-01

## 2024-01-01 RX ORDER — LORAZEPAM 2 MG/ML
2 INJECTION INTRAMUSCULAR
Status: DISPENSED | OUTPATIENT
Start: 2024-01-01 | End: 2024-01-01

## 2024-01-01 RX ORDER — LORAZEPAM 2 MG/ML
2 INJECTION INTRAMUSCULAR
Status: ACTIVE | OUTPATIENT
Start: 2024-01-01 | End: 2024-01-01

## 2024-01-01 RX ORDER — SCOLOPAMINE TRANSDERMAL SYSTEM 1 MG/1
1 PATCH, EXTENDED RELEASE TRANSDERMAL
Status: DISCONTINUED | OUTPATIENT
Start: 2024-01-01 | End: 2024-01-01 | Stop reason: HOSPADM

## 2024-01-01 RX ORDER — ATROPINE SULFATE 10 MG/ML
2 SOLUTION/ DROPS OPHTHALMIC 2 TIMES DAILY PRN
Status: DISCONTINUED | OUTPATIENT
Start: 2024-01-01 | End: 2024-01-01 | Stop reason: HOSPADM

## 2024-01-01 RX ORDER — FOLIC ACID 1 MG/1
1000 TABLET ORAL DAILY
COMMUNITY
Start: 2024-01-01 | End: 2025-07-16

## 2024-01-01 RX ORDER — LORAZEPAM 2 MG/ML
1 INJECTION INTRAMUSCULAR DAILY
Status: COMPLETED | OUTPATIENT
Start: 2024-01-01 | End: 2024-01-01

## 2024-01-01 RX ORDER — CHLORHEXIDINE GLUCONATE ORAL RINSE 1.2 MG/ML
15 SOLUTION DENTAL EVERY 12 HOURS SCHEDULED
Status: DISCONTINUED | OUTPATIENT
Start: 2024-01-01 | End: 2024-01-01

## 2024-01-01 RX ORDER — POTASSIUM CHLORIDE 7.45 MG/ML
10 INJECTION INTRAVENOUS
Status: COMPLETED | OUTPATIENT
Start: 2024-01-01 | End: 2024-01-01

## 2024-01-01 RX ORDER — DEXTROSE MONOHYDRATE 25 G/50ML
25 INJECTION, SOLUTION INTRAVENOUS
Status: DISCONTINUED | OUTPATIENT
Start: 2024-01-01 | End: 2024-01-01

## 2024-01-01 RX ORDER — HEPARIN SODIUM 5000 [USP'U]/ML
5000 INJECTION, SOLUTION INTRAVENOUS; SUBCUTANEOUS EVERY 8 HOURS SCHEDULED
Status: DISCONTINUED | OUTPATIENT
Start: 2024-01-01 | End: 2024-01-01

## 2024-01-01 RX ORDER — CALCIUM CHLORIDE, MAGNESIUM CHLORIDE, SODIUM CHLORIDE, SODIUM BICARBONATE, POTASSIUM CHLORIDE AND SODIUM PHOSPHATE DIBASIC DIHYDRATE 3.68; 3.05; 6.34; 3.09; .314; .187 G/L; G/L; G/L; G/L; G/L; G/L
1500 INJECTION INTRAVENOUS CONTINUOUS
Status: DISCONTINUED | OUTPATIENT
Start: 2024-01-01 | End: 2024-01-01

## 2024-01-01 RX ORDER — BISACODYL 10 MG
10 SUPPOSITORY, RECTAL RECTAL DAILY PRN
Status: DISCONTINUED | OUTPATIENT
Start: 2024-01-01 | End: 2024-01-01

## 2024-01-01 RX ORDER — CALCIUM CHLORIDE, MAGNESIUM CHLORIDE, SODIUM CHLORIDE, SODIUM BICARBONATE, POTASSIUM CHLORIDE AND SODIUM PHOSPHATE DIBASIC DIHYDRATE 3.68; 3.05; 6.34; 3.09; .314; .187 G/L; G/L; G/L; G/L; G/L; G/L
2000 INJECTION INTRAVENOUS CONTINUOUS
Status: DISCONTINUED | OUTPATIENT
Start: 2024-01-01 | End: 2024-01-01

## 2024-01-01 RX ORDER — CITALOPRAM HYDROBROMIDE 20 MG/1
20 TABLET ORAL DAILY
Status: DISCONTINUED | OUTPATIENT
Start: 2024-01-01 | End: 2024-01-01

## 2024-01-01 RX ORDER — ATORVASTATIN CALCIUM 20 MG/1
20 TABLET, FILM COATED ORAL NIGHTLY
Status: DISCONTINUED | OUTPATIENT
Start: 2024-01-01 | End: 2024-01-01

## 2024-01-01 RX ORDER — DEXMEDETOMIDINE HYDROCHLORIDE 4 UG/ML
.2-1.5 INJECTION, SOLUTION INTRAVENOUS
Status: DISCONTINUED | OUTPATIENT
Start: 2024-01-01 | End: 2024-01-01

## 2024-01-01 RX ORDER — MORPHINE SULFATE 2 MG/ML
4 INJECTION, SOLUTION INTRAMUSCULAR; INTRAVENOUS
Status: DISCONTINUED | OUTPATIENT
Start: 2024-01-01 | End: 2024-01-01 | Stop reason: HOSPADM

## 2024-01-01 RX ORDER — LORAZEPAM 2 MG/ML
1 INJECTION INTRAMUSCULAR EVERY 12 HOURS
Status: DISPENSED | OUTPATIENT
Start: 2024-01-01 | End: 2024-01-01

## 2024-01-01 RX ORDER — IBUPROFEN 400 MG/1
400 TABLET, FILM COATED ORAL ONCE
Status: DISCONTINUED | OUTPATIENT
Start: 2024-01-01 | End: 2024-01-01

## 2024-01-01 RX ORDER — PHENYLEPHRINE HCL IN 0.9% NACL 0.5 MG/5ML
.5-3 SYRINGE (ML) INTRAVENOUS
Status: DISCONTINUED | OUTPATIENT
Start: 2024-01-01 | End: 2024-01-01

## 2024-01-01 RX ORDER — AMOXICILLIN 250 MG
2 CAPSULE ORAL 2 TIMES DAILY PRN
Status: DISCONTINUED | OUTPATIENT
Start: 2024-01-01 | End: 2024-01-01

## 2024-01-01 RX ORDER — THIAMINE HYDROCHLORIDE 100 MG/ML
200 INJECTION, SOLUTION INTRAMUSCULAR; INTRAVENOUS EVERY 8 HOURS SCHEDULED
Status: DISCONTINUED | OUTPATIENT
Start: 2024-01-01 | End: 2024-01-01

## 2024-01-01 RX ORDER — THIAMINE HYDROCHLORIDE 100 MG/ML
200 INJECTION, SOLUTION INTRAMUSCULAR; INTRAVENOUS ONCE
Status: COMPLETED | OUTPATIENT
Start: 2024-01-01 | End: 2024-01-01

## 2024-01-01 RX ORDER — ACETAMINOPHEN 650 MG/1
650 SUPPOSITORY RECTAL EVERY 4 HOURS PRN
Status: DISCONTINUED | OUTPATIENT
Start: 2024-01-01 | End: 2024-01-01 | Stop reason: HOSPADM

## 2024-01-01 RX ORDER — FOLIC ACID 1 MG/1
1 TABLET ORAL DAILY
Status: DISCONTINUED | OUTPATIENT
Start: 2024-01-01 | End: 2024-01-01

## 2024-01-01 RX ORDER — LEVOTHYROXINE SODIUM 25 UG/1
25 TABLET ORAL
Status: DISCONTINUED | OUTPATIENT
Start: 2024-01-01 | End: 2024-01-01

## 2024-01-01 RX ORDER — SODIUM CHLORIDE 0.9 % (FLUSH) 0.9 %
10 SYRINGE (ML) INJECTION EVERY 12 HOURS SCHEDULED
Status: DISCONTINUED | OUTPATIENT
Start: 2024-01-01 | End: 2024-01-01

## 2024-01-01 RX ORDER — CALCIUM GLUCONATE 20 MG/ML
1000 INJECTION, SOLUTION INTRAVENOUS ONCE
Status: COMPLETED | OUTPATIENT
Start: 2024-01-01 | End: 2024-01-01

## 2024-01-01 RX ORDER — POTASSIUM CHLORIDE 750 MG/1
10 TABLET, EXTENDED RELEASE ORAL
COMMUNITY
Start: 2024-01-01

## 2024-01-01 RX ORDER — SODIUM CHLORIDE, SODIUM LACTATE, POTASSIUM CHLORIDE, CALCIUM CHLORIDE 600; 310; 30; 20 MG/100ML; MG/100ML; MG/100ML; MG/100ML
150 INJECTION, SOLUTION INTRAVENOUS CONTINUOUS
Status: DISCONTINUED | OUTPATIENT
Start: 2024-01-01 | End: 2024-01-01

## 2024-01-01 RX ORDER — BISACODYL 10 MG
10 SUPPOSITORY, RECTAL RECTAL DAILY
Status: DISCONTINUED | OUTPATIENT
Start: 2024-01-01 | End: 2024-01-01

## 2024-01-01 RX ORDER — MORPHINE SULFATE 2 MG/ML
2 INJECTION, SOLUTION INTRAMUSCULAR; INTRAVENOUS
Status: DISCONTINUED | OUTPATIENT
Start: 2024-01-01 | End: 2024-01-01 | Stop reason: HOSPADM

## 2024-01-01 RX ORDER — LORAZEPAM 1 MG/1
1 TABLET ORAL EVERY 12 HOURS SCHEDULED
Status: DISCONTINUED | OUTPATIENT
Start: 2024-01-01 | End: 2024-01-01

## 2024-01-01 RX ORDER — BISACODYL 5 MG/1
5 TABLET, DELAYED RELEASE ORAL DAILY PRN
Status: DISCONTINUED | OUTPATIENT
Start: 2024-01-01 | End: 2024-01-01

## 2024-01-01 RX ORDER — PROMETHAZINE HYDROCHLORIDE 25 MG/1
6.25 TABLET ORAL EVERY 4 HOURS PRN
Status: DISCONTINUED | OUTPATIENT
Start: 2024-01-01 | End: 2024-01-01 | Stop reason: HOSPADM

## 2024-01-01 RX ORDER — FENTANYL CITRATE 50 UG/ML
50 INJECTION, SOLUTION INTRAMUSCULAR; INTRAVENOUS
Status: DISCONTINUED | OUTPATIENT
Start: 2024-01-01 | End: 2024-01-01

## 2024-01-01 RX ORDER — POLYETHYLENE GLYCOL 3350 17 G/17G
17 POWDER, FOR SOLUTION ORAL DAILY PRN
Status: DISCONTINUED | OUTPATIENT
Start: 2024-01-01 | End: 2024-01-01

## 2024-01-01 RX ORDER — THIAMINE HYDROCHLORIDE 100 MG/ML
200 INJECTION, SOLUTION INTRAMUSCULAR; INTRAVENOUS EVERY 8 HOURS SCHEDULED
Status: COMPLETED | OUTPATIENT
Start: 2024-01-01 | End: 2024-01-01

## 2024-01-01 RX ORDER — BUMETANIDE 0.25 MG/ML
4 INJECTION INTRAMUSCULAR; INTRAVENOUS ONCE
Status: COMPLETED | OUTPATIENT
Start: 2024-01-01 | End: 2024-01-01

## 2024-01-01 RX ORDER — CALCIUM GLUCONATE 20 MG/ML
1000 INJECTION, SOLUTION INTRAVENOUS ONCE
Status: DISCONTINUED | OUTPATIENT
Start: 2024-01-01 | End: 2024-01-01

## 2024-01-01 RX ORDER — MORPHINE SULFATE 10 MG/ML
6 INJECTION INTRAMUSCULAR; INTRAVENOUS; SUBCUTANEOUS
Status: DISCONTINUED | OUTPATIENT
Start: 2024-01-01 | End: 2024-01-01 | Stop reason: HOSPADM

## 2024-01-01 RX ORDER — NOREPINEPHRINE BITARTRATE 0.03 MG/ML
.02-.3 INJECTION, SOLUTION INTRAVENOUS
Status: DISCONTINUED | OUTPATIENT
Start: 2024-01-01 | End: 2024-01-01

## 2024-01-01 RX ORDER — LORATADINE 10 MG/1
10 TABLET ORAL DAILY
COMMUNITY
Start: 2024-01-01

## 2024-01-01 RX ORDER — MIDAZOLAM HYDROCHLORIDE 1 MG/ML
4 INJECTION INTRAMUSCULAR; INTRAVENOUS
Status: DISCONTINUED | OUTPATIENT
Start: 2024-01-01 | End: 2024-01-01 | Stop reason: HOSPADM

## 2024-01-01 RX ORDER — POTASSIUM CHLORIDE 1.5 G/1.58G
20 POWDER, FOR SOLUTION ORAL 2 TIMES DAILY
Status: COMPLETED | OUTPATIENT
Start: 2024-01-01 | End: 2024-01-01

## 2024-01-01 RX ORDER — GLYCOPYRROLATE 0.2 MG/ML
0.2 INJECTION INTRAMUSCULAR; INTRAVENOUS
Status: DISCONTINUED | OUTPATIENT
Start: 2024-01-01 | End: 2024-01-01 | Stop reason: HOSPADM

## 2024-01-01 RX ORDER — METOPROLOL TARTRATE 25 MG/1
12.5 TABLET, FILM COATED ORAL EVERY 12 HOURS SCHEDULED
Status: DISCONTINUED | OUTPATIENT
Start: 2024-01-01 | End: 2024-01-01

## 2024-01-01 RX ORDER — KETOROLAC TROMETHAMINE 30 MG/ML
15 INJECTION, SOLUTION INTRAMUSCULAR; INTRAVENOUS EVERY 6 HOURS PRN
Status: DISCONTINUED | OUTPATIENT
Start: 2024-01-01 | End: 2024-01-01 | Stop reason: HOSPADM

## 2024-01-01 RX ORDER — MORPHINE SULFATE 20 MG/ML
10 SOLUTION ORAL
Status: DISCONTINUED | OUTPATIENT
Start: 2024-01-01 | End: 2024-01-01 | Stop reason: HOSPADM

## 2024-01-01 RX ORDER — LORAZEPAM 2 MG/ML
2 CONCENTRATE ORAL
Status: DISCONTINUED | OUTPATIENT
Start: 2024-01-01 | End: 2024-01-01 | Stop reason: HOSPADM

## 2024-01-01 RX ORDER — LORAZEPAM 2 MG/ML
1 INJECTION INTRAMUSCULAR EVERY 12 HOURS
Status: COMPLETED | OUTPATIENT
Start: 2024-01-01 | End: 2024-01-01

## 2024-01-01 RX ORDER — HEPARIN SODIUM (PORCINE) LOCK FLUSH IV SOLN 100 UNIT/ML 100 UNIT/ML
300 SOLUTION INTRAVENOUS AS NEEDED
Status: DISCONTINUED | OUTPATIENT
Start: 2024-01-01 | End: 2024-01-01

## 2024-01-01 RX ORDER — ROSUVASTATIN CALCIUM 40 MG/1
40 TABLET, COATED ORAL DAILY
COMMUNITY
Start: 2024-01-01 | End: 2025-07-16

## 2024-01-01 RX ORDER — CALCIUM GLUCONATE 20 MG/ML
2000 INJECTION, SOLUTION INTRAVENOUS ONCE
Status: COMPLETED | OUTPATIENT
Start: 2024-01-01 | End: 2024-01-01

## 2024-01-01 RX ORDER — ERGOCALCIFEROL 1.25 MG/1
50000 CAPSULE, LIQUID FILLED ORAL
Status: DISCONTINUED | OUTPATIENT
Start: 2024-01-01 | End: 2024-01-01

## 2024-01-01 RX ORDER — LORAZEPAM 1 MG/1
2 TABLET ORAL
Status: ACTIVE | OUTPATIENT
Start: 2024-01-01 | End: 2024-01-01

## 2024-01-01 RX ORDER — IBUPROFEN 600 MG/1
1 TABLET ORAL
Status: DISCONTINUED | OUTPATIENT
Start: 2024-01-01 | End: 2024-01-01

## 2024-01-01 RX ORDER — LORAZEPAM 2 MG/ML
1 CONCENTRATE ORAL
Status: DISCONTINUED | OUTPATIENT
Start: 2024-01-01 | End: 2024-01-01 | Stop reason: HOSPADM

## 2024-01-01 RX ORDER — NICOTINE POLACRILEX 4 MG
15 LOZENGE BUCCAL
Status: DISCONTINUED | OUTPATIENT
Start: 2024-01-01 | End: 2024-01-01

## 2024-01-01 RX ORDER — BUMETANIDE 0.25 MG/ML
2 INJECTION INTRAMUSCULAR; INTRAVENOUS EVERY 8 HOURS
Status: COMPLETED | OUTPATIENT
Start: 2024-01-01 | End: 2024-01-01

## 2024-01-01 RX ORDER — PROMETHAZINE HYDROCHLORIDE 12.5 MG/1
6.25 SUPPOSITORY RECTAL EVERY 4 HOURS PRN
Status: DISCONTINUED | OUTPATIENT
Start: 2024-01-01 | End: 2024-01-01 | Stop reason: HOSPADM

## 2024-01-01 RX ORDER — SODIUM CHLORIDE 9 MG/ML
150 INJECTION, SOLUTION INTRAVENOUS CONTINUOUS
Status: DISCONTINUED | OUTPATIENT
Start: 2024-01-01 | End: 2024-01-01

## 2024-01-01 RX ORDER — ROSUVASTATIN CALCIUM 10 MG/1
10 TABLET, COATED ORAL DAILY
Status: DISCONTINUED | OUTPATIENT
Start: 2024-01-01 | End: 2024-01-01

## 2024-01-01 RX ORDER — LORAZEPAM 2 MG/ML
0.5 CONCENTRATE ORAL
Status: DISCONTINUED | OUTPATIENT
Start: 2024-01-01 | End: 2024-01-01 | Stop reason: HOSPADM

## 2024-01-01 RX ORDER — ASPIRIN 81 MG/1
81 TABLET ORAL DAILY
COMMUNITY

## 2024-01-01 RX ORDER — CALCIUM GLUCONATE 94 MG/ML
2 INJECTION, SOLUTION INTRAVENOUS ONCE
Status: DISCONTINUED | OUTPATIENT
Start: 2024-01-01 | End: 2024-01-01

## 2024-01-01 RX ORDER — LISINOPRIL 30 MG/1
30 TABLET ORAL DAILY
COMMUNITY
Start: 2024-01-01 | End: 2025-07-16

## 2024-01-01 RX ORDER — DIPHENOXYLATE HCL/ATROPINE 2.5-.025MG
1 TABLET ORAL
Status: DISCONTINUED | OUTPATIENT
Start: 2024-01-01 | End: 2024-01-01 | Stop reason: HOSPADM

## 2024-01-01 RX ORDER — LIDOCAINE HYDROCHLORIDE 10 MG/ML
20 INJECTION, SOLUTION INFILTRATION; PERINEURAL ONCE
Status: COMPLETED | OUTPATIENT
Start: 2024-01-01 | End: 2024-01-01

## 2024-01-01 RX ORDER — METOPROLOL SUCCINATE 25 MG/1
25 TABLET, EXTENDED RELEASE ORAL DAILY
COMMUNITY
Start: 2024-01-01 | End: 2025-07-16

## 2024-01-01 RX ORDER — MORPHINE SULFATE 20 MG/ML
20 SOLUTION ORAL
Status: DISCONTINUED | OUTPATIENT
Start: 2024-01-01 | End: 2024-01-01 | Stop reason: HOSPADM

## 2024-01-01 RX ORDER — MORPHINE SULFATE 20 MG/ML
5 SOLUTION ORAL
Status: DISCONTINUED | OUTPATIENT
Start: 2024-01-01 | End: 2024-01-01 | Stop reason: HOSPADM

## 2024-01-01 RX ORDER — LORAZEPAM 1 MG/1
2 TABLET ORAL EVERY 6 HOURS
Status: DISCONTINUED | OUTPATIENT
Start: 2024-01-01 | End: 2024-01-01

## 2024-01-01 RX ORDER — PHENYLEPHRINE HCL IN 0.9% NACL 0.5 MG/5ML
SYRINGE (ML) INTRAVENOUS
Status: DISCONTINUED
Start: 2024-01-01 | End: 2024-01-01 | Stop reason: WASHOUT

## 2024-01-01 RX ORDER — ASPIRIN 81 MG/1
81 TABLET, CHEWABLE ORAL DAILY
Status: DISCONTINUED | OUTPATIENT
Start: 2024-01-01 | End: 2024-01-01

## 2024-01-01 RX ORDER — POTASSIUM CHLORIDE 1.5 G/1.58G
20 POWDER, FOR SOLUTION ORAL ONCE
Status: COMPLETED | OUTPATIENT
Start: 2024-01-01 | End: 2024-01-01

## 2024-01-01 RX ORDER — SODIUM CHLORIDE 9 MG/ML
500 INJECTION, SOLUTION INTRAVENOUS ONCE
Status: COMPLETED | OUTPATIENT
Start: 2024-01-01 | End: 2024-01-01

## 2024-01-01 RX ORDER — ONDANSETRON 4 MG/1
4 TABLET, ORALLY DISINTEGRATING ORAL EVERY 6 HOURS PRN
Status: DISCONTINUED | OUTPATIENT
Start: 2024-01-01 | End: 2024-01-01

## 2024-01-01 RX ORDER — LORAZEPAM 2 MG/ML
1 INJECTION INTRAMUSCULAR
Status: DISCONTINUED | OUTPATIENT
Start: 2024-01-01 | End: 2024-01-01 | Stop reason: HOSPADM

## 2024-01-01 RX ORDER — MAGNESIUM SULFATE HEPTAHYDRATE 40 MG/ML
2 INJECTION, SOLUTION INTRAVENOUS ONCE
Status: COMPLETED | OUTPATIENT
Start: 2024-01-01 | End: 2024-01-01

## 2024-01-01 RX ORDER — POTASSIUM CHLORIDE 7.45 MG/ML
10 INJECTION INTRAVENOUS ONCE
Status: DISCONTINUED | OUTPATIENT
Start: 2024-01-01 | End: 2024-01-01

## 2024-01-01 RX ORDER — IPRATROPIUM BROMIDE AND ALBUTEROL SULFATE 2.5; .5 MG/3ML; MG/3ML
3 SOLUTION RESPIRATORY (INHALATION)
Status: DISCONTINUED | OUTPATIENT
Start: 2024-01-01 | End: 2024-01-01

## 2024-01-01 RX ORDER — ALBUTEROL SULFATE 0.83 MG/ML
2.5 SOLUTION RESPIRATORY (INHALATION) EVERY 6 HOURS PRN
Status: DISCONTINUED | OUTPATIENT
Start: 2024-01-01 | End: 2024-01-01

## 2024-01-01 RX ADMIN — POTASSIUM CHLORIDE 10 MEQ: 7.46 INJECTION, SOLUTION INTRAVENOUS at 18:17

## 2024-01-01 RX ADMIN — PANTOPRAZOLE SODIUM 40 MG: 40 INJECTION, POWDER, FOR SOLUTION INTRAVENOUS at 08:42

## 2024-01-01 RX ADMIN — POTASSIUM CHLORIDE 10 MEQ: 7.46 INJECTION, SOLUTION INTRAVENOUS at 01:55

## 2024-01-01 RX ADMIN — MORPHINE SULFATE 4 MG: 2 INJECTION, SOLUTION INTRAMUSCULAR; INTRAVENOUS at 13:01

## 2024-01-01 RX ADMIN — CALCIUM CHLORIDE, MAGNESIUM CHLORIDE, SODIUM CHLORIDE, SODIUM BICARBONATE, POTASSIUM CHLORIDE AND SODIUM PHOSPHATE DIBASIC DIHYDRATE 1500 ML/HR: 3.68; 3.05; 6.34; 3.09; .314; .187 INJECTION INTRAVENOUS at 06:32

## 2024-01-01 RX ADMIN — CEFEPIME 2000 MG: 2 INJECTION, POWDER, FOR SOLUTION INTRAVENOUS at 22:44

## 2024-01-01 RX ADMIN — IPRATROPIUM BROMIDE AND ALBUTEROL SULFATE 3 ML: 2.5; .5 SOLUTION RESPIRATORY (INHALATION) at 07:02

## 2024-01-01 RX ADMIN — THIAMINE HYDROCHLORIDE 200 MG: 100 INJECTION, SOLUTION INTRAMUSCULAR; INTRAVENOUS at 00:32

## 2024-01-01 RX ADMIN — CEFEPIME 2000 MG: 2 INJECTION, POWDER, FOR SOLUTION INTRAVENOUS at 15:21

## 2024-01-01 RX ADMIN — VASOPRESSIN 0.03 UNITS/MIN: 20 INJECTION INTRAVENOUS at 20:26

## 2024-01-01 RX ADMIN — CEFEPIME 2000 MG: 2 INJECTION, POWDER, FOR SOLUTION INTRAVENOUS at 22:51

## 2024-01-01 RX ADMIN — IPRATROPIUM BROMIDE AND ALBUTEROL SULFATE 3 ML: 2.5; .5 SOLUTION RESPIRATORY (INHALATION) at 20:12

## 2024-01-01 RX ADMIN — DEXTROSE MONOHYDRATE 25 G: 25 INJECTION, SOLUTION INTRAVENOUS at 12:43

## 2024-01-01 RX ADMIN — CALCIUM CHLORIDE, MAGNESIUM CHLORIDE, SODIUM CHLORIDE, SODIUM BICARBONATE, POTASSIUM CHLORIDE AND SODIUM PHOSPHATE DIBASIC DIHYDRATE 2000 ML/HR: 3.68; 3.05; 6.34; 3.09; .314; .187 INJECTION INTRAVENOUS at 12:05

## 2024-01-01 RX ADMIN — THIAMINE HYDROCHLORIDE 200 MG: 100 INJECTION, SOLUTION INTRAMUSCULAR; INTRAVENOUS at 08:24

## 2024-01-01 RX ADMIN — IPRATROPIUM BROMIDE AND ALBUTEROL SULFATE 3 ML: 2.5; .5 SOLUTION RESPIRATORY (INHALATION) at 14:49

## 2024-01-01 RX ADMIN — FOLIC ACID 1 MG: 1 TABLET ORAL at 08:24

## 2024-01-01 RX ADMIN — ACETAMINOPHEN 650 MG: 650 LIQUID ORAL at 06:15

## 2024-01-01 RX ADMIN — METRONIDAZOLE 500 MG: 500 INJECTION, SOLUTION INTRAVENOUS at 19:45

## 2024-01-01 RX ADMIN — DEXMEDETOMIDINE HYDROCHLORIDE 1.5 MCG/KG/HR: 4 INJECTION, SOLUTION INTRAVENOUS at 04:00

## 2024-01-01 RX ADMIN — CALCIUM CHLORIDE, MAGNESIUM CHLORIDE, SODIUM CHLORIDE, SODIUM BICARBONATE, POTASSIUM CHLORIDE AND SODIUM PHOSPHATE DIBASIC DIHYDRATE 1500 ML/HR: 3.68; 3.05; 6.34; 3.09; .314; .187 INJECTION INTRAVENOUS at 22:29

## 2024-01-01 RX ADMIN — CHLORHEXIDINE GLUCONATE 15 ML: 1.2 RINSE ORAL at 21:20

## 2024-01-01 RX ADMIN — FOLIC ACID 1 MG: 1 TABLET ORAL at 08:06

## 2024-01-01 RX ADMIN — Medication 100 MG: at 08:42

## 2024-01-01 RX ADMIN — METOPROLOL TARTRATE 12.5 MG: 25 TABLET, FILM COATED ORAL at 13:18

## 2024-01-01 RX ADMIN — SODIUM BICARBONATE 100 MEQ: 84 INJECTION INTRAVENOUS at 14:12

## 2024-01-01 RX ADMIN — METRONIDAZOLE 500 MG: 500 INJECTION, SOLUTION INTRAVENOUS at 11:43

## 2024-01-01 RX ADMIN — CALCIUM CHLORIDE, MAGNESIUM CHLORIDE, SODIUM CHLORIDE, SODIUM BICARBONATE, POTASSIUM CHLORIDE AND SODIUM PHOSPHATE DIBASIC DIHYDRATE 1500 ML/HR: 3.68; 3.05; 6.34; 3.09; .314; .187 INJECTION INTRAVENOUS at 03:40

## 2024-01-01 RX ADMIN — POTASSIUM CHLORIDE 10 MEQ: 7.46 INJECTION, SOLUTION INTRAVENOUS at 04:35

## 2024-01-01 RX ADMIN — CALCIUM CHLORIDE, MAGNESIUM CHLORIDE, SODIUM CHLORIDE, SODIUM BICARBONATE, POTASSIUM CHLORIDE AND SODIUM PHOSPHATE DIBASIC DIHYDRATE 1500 ML/HR: 3.68; 3.05; 6.34; 3.09; .314; .187 INJECTION INTRAVENOUS at 06:22

## 2024-01-01 RX ADMIN — METRONIDAZOLE 500 MG: 500 INJECTION, SOLUTION INTRAVENOUS at 11:42

## 2024-01-01 RX ADMIN — THIAMINE HYDROCHLORIDE 200 MG: 100 INJECTION, SOLUTION INTRAMUSCULAR; INTRAVENOUS at 08:27

## 2024-01-01 RX ADMIN — CALCIUM CHLORIDE, MAGNESIUM CHLORIDE, SODIUM CHLORIDE, SODIUM BICARBONATE, POTASSIUM CHLORIDE AND SODIUM PHOSPHATE DIBASIC DIHYDRATE 1500 ML/HR: 3.68; 3.05; 6.34; 3.09; .314; .187 INJECTION INTRAVENOUS at 22:30

## 2024-01-01 RX ADMIN — HEPARIN SODIUM 5000 UNITS: 5000 INJECTION INTRAVENOUS; SUBCUTANEOUS at 16:21

## 2024-01-01 RX ADMIN — POTASSIUM CHLORIDE 20 MEQ: 1.5 FOR SOLUTION ORAL at 20:03

## 2024-01-01 RX ADMIN — CEFEPIME 2000 MG: 2 INJECTION, POWDER, FOR SOLUTION INTRAVENOUS at 06:00

## 2024-01-01 RX ADMIN — CITALOPRAM 20 MG: 20 TABLET, FILM COATED ORAL at 08:04

## 2024-01-01 RX ADMIN — METRONIDAZOLE 500 MG: 500 INJECTION, SOLUTION INTRAVENOUS at 18:56

## 2024-01-01 RX ADMIN — THIAMINE HYDROCHLORIDE 200 MG: 100 INJECTION, SOLUTION INTRAMUSCULAR; INTRAVENOUS at 01:57

## 2024-01-01 RX ADMIN — CHLORHEXIDINE GLUCONATE 15 ML: 1.2 RINSE ORAL at 20:21

## 2024-01-01 RX ADMIN — PHENYLEPHRINE HYDROCHLORIDE 1.3 MCG/KG/MIN: 10 INJECTION INTRAVENOUS at 18:28

## 2024-01-01 RX ADMIN — POTASSIUM CHLORIDE 10 MEQ: 7.46 INJECTION, SOLUTION INTRAVENOUS at 06:08

## 2024-01-01 RX ADMIN — THIAMINE HYDROCHLORIDE 200 MG: 100 INJECTION, SOLUTION INTRAMUSCULAR; INTRAVENOUS at 15:27

## 2024-01-01 RX ADMIN — PROPOFOL INJECTABLE EMULSION 50 MCG/KG/MIN: 10 INJECTION, EMULSION INTRAVENOUS at 02:05

## 2024-01-01 RX ADMIN — CALCIUM CHLORIDE, MAGNESIUM CHLORIDE, SODIUM CHLORIDE, SODIUM BICARBONATE, POTASSIUM CHLORIDE AND SODIUM PHOSPHATE DIBASIC DIHYDRATE 2000 ML/HR: 3.68; 3.05; 6.34; 3.09; .314; .187 INJECTION INTRAVENOUS at 12:06

## 2024-01-01 RX ADMIN — PANTOPRAZOLE SODIUM 40 MG: 40 INJECTION, POWDER, FOR SOLUTION INTRAVENOUS at 08:10

## 2024-01-01 RX ADMIN — IPRATROPIUM BROMIDE AND ALBUTEROL SULFATE 3 ML: 2.5; .5 SOLUTION RESPIRATORY (INHALATION) at 15:56

## 2024-01-01 RX ADMIN — METOPROLOL TARTRATE 12.5 MG: 25 TABLET, FILM COATED ORAL at 08:18

## 2024-01-01 RX ADMIN — IPRATROPIUM BROMIDE AND ALBUTEROL SULFATE 3 ML: 2.5; .5 SOLUTION RESPIRATORY (INHALATION) at 10:59

## 2024-01-01 RX ADMIN — FENTANYL CITRATE 50 MCG: 50 INJECTION, SOLUTION INTRAMUSCULAR; INTRAVENOUS at 09:38

## 2024-01-01 RX ADMIN — ASPIRIN 81 MG: 81 TABLET, CHEWABLE ORAL at 09:18

## 2024-01-01 RX ADMIN — SODIUM BICARBONATE 150 MEQ: 84 INJECTION, SOLUTION INTRAVENOUS at 13:45

## 2024-01-01 RX ADMIN — HEPARIN SODIUM 5000 UNITS: 5000 INJECTION INTRAVENOUS; SUBCUTANEOUS at 15:21

## 2024-01-01 RX ADMIN — HEPARIN SODIUM 5000 UNITS: 5000 INJECTION INTRAVENOUS; SUBCUTANEOUS at 21:59

## 2024-01-01 RX ADMIN — METRONIDAZOLE 500 MG: 500 INJECTION, SOLUTION INTRAVENOUS at 11:17

## 2024-01-01 RX ADMIN — Medication 100 MG: at 08:08

## 2024-01-01 RX ADMIN — LEVOTHYROXINE SODIUM 25 MCG: 25 TABLET ORAL at 06:31

## 2024-01-01 RX ADMIN — CALCIUM CHLORIDE, MAGNESIUM CHLORIDE, SODIUM CHLORIDE, SODIUM BICARBONATE, POTASSIUM CHLORIDE AND SODIUM PHOSPHATE DIBASIC DIHYDRATE 1500 ML/HR: 3.68; 3.05; 6.34; 3.09; .314; .187 INJECTION INTRAVENOUS at 16:11

## 2024-01-01 RX ADMIN — METRONIDAZOLE 500 MG: 500 INJECTION, SOLUTION INTRAVENOUS at 03:44

## 2024-01-01 RX ADMIN — METRONIDAZOLE 500 MG: 500 INJECTION, SOLUTION INTRAVENOUS at 12:02

## 2024-01-01 RX ADMIN — Medication 10 ML: at 08:19

## 2024-01-01 RX ADMIN — ACETAMINOPHEN 650 MG: 650 LIQUID ORAL at 06:43

## 2024-01-01 RX ADMIN — BISACODYL 10 MG: 10 SUPPOSITORY RECTAL at 08:20

## 2024-01-01 RX ADMIN — PANTOPRAZOLE SODIUM 40 MG: 40 INJECTION, POWDER, FOR SOLUTION INTRAVENOUS at 08:06

## 2024-01-01 RX ADMIN — METRONIDAZOLE 500 MG: 500 INJECTION, SOLUTION INTRAVENOUS at 03:05

## 2024-01-01 RX ADMIN — SODIUM BICARBONATE 50 MEQ: 84 INJECTION INTRAVENOUS at 16:34

## 2024-01-01 RX ADMIN — CALCIUM CHLORIDE, MAGNESIUM CHLORIDE, SODIUM CHLORIDE, SODIUM BICARBONATE, POTASSIUM CHLORIDE AND SODIUM PHOSPHATE DIBASIC DIHYDRATE 1500 ML/HR: 3.68; 3.05; 6.34; 3.09; .314; .187 INJECTION INTRAVENOUS at 08:28

## 2024-01-01 RX ADMIN — POTASSIUM CHLORIDE 10 MEQ: 7.46 INJECTION, SOLUTION INTRAVENOUS at 08:25

## 2024-01-01 RX ADMIN — LEVOTHYROXINE SODIUM 25 MCG: 25 TABLET ORAL at 06:20

## 2024-01-01 RX ADMIN — METRONIDAZOLE 500 MG: 500 INJECTION, SOLUTION INTRAVENOUS at 23:15

## 2024-01-01 RX ADMIN — IPRATROPIUM BROMIDE AND ALBUTEROL SULFATE 3 ML: 2.5; .5 SOLUTION RESPIRATORY (INHALATION) at 07:22

## 2024-01-01 RX ADMIN — CHLORHEXIDINE GLUCONATE 15 ML: 1.2 RINSE ORAL at 00:14

## 2024-01-01 RX ADMIN — VASOPRESSIN 0.03 UNITS/MIN: 20 INJECTION INTRAVENOUS at 05:06

## 2024-01-01 RX ADMIN — IPRATROPIUM BROMIDE AND ALBUTEROL SULFATE 3 ML: 2.5; .5 SOLUTION RESPIRATORY (INHALATION) at 07:59

## 2024-01-01 RX ADMIN — FENTANYL CITRATE 50 MCG: 50 INJECTION, SOLUTION INTRAMUSCULAR; INTRAVENOUS at 02:57

## 2024-01-01 RX ADMIN — IPRATROPIUM BROMIDE AND ALBUTEROL SULFATE 3 ML: 2.5; .5 SOLUTION RESPIRATORY (INHALATION) at 19:13

## 2024-01-01 RX ADMIN — DEXMEDETOMIDINE HYDROCHLORIDE 0.5 MCG/KG/HR: 4 INJECTION, SOLUTION INTRAVENOUS at 23:30

## 2024-01-01 RX ADMIN — FENTANYL CITRATE 50 MCG: 50 INJECTION, SOLUTION INTRAMUSCULAR; INTRAVENOUS at 23:05

## 2024-01-01 RX ADMIN — Medication 10 ML: at 20:35

## 2024-01-01 RX ADMIN — PANTOPRAZOLE SODIUM 40 MG: 40 INJECTION, POWDER, FOR SOLUTION INTRAVENOUS at 11:18

## 2024-01-01 RX ADMIN — CALCIUM CHLORIDE, MAGNESIUM CHLORIDE, SODIUM CHLORIDE, SODIUM BICARBONATE, POTASSIUM CHLORIDE AND SODIUM PHOSPHATE DIBASIC DIHYDRATE 2000 ML/HR: 3.68; 3.05; 6.34; 3.09; .314; .187 INJECTION INTRAVENOUS at 21:13

## 2024-01-01 RX ADMIN — CEFEPIME 2000 MG: 2 INJECTION, POWDER, FOR SOLUTION INTRAVENOUS at 06:20

## 2024-01-01 RX ADMIN — DEXMEDETOMIDINE HYDROCHLORIDE 1.5 MCG/KG/HR: 4 INJECTION, SOLUTION INTRAVENOUS at 09:23

## 2024-01-01 RX ADMIN — DEXMEDETOMIDINE HYDROCHLORIDE 1.2 MCG/KG/HR: 4 INJECTION, SOLUTION INTRAVENOUS at 09:39

## 2024-01-01 RX ADMIN — Medication 100 MG: at 10:10

## 2024-01-01 RX ADMIN — CALCIUM CHLORIDE, MAGNESIUM CHLORIDE, SODIUM CHLORIDE, SODIUM BICARBONATE, POTASSIUM CHLORIDE AND SODIUM PHOSPHATE DIBASIC DIHYDRATE 1500 ML/HR: 3.68; 3.05; 6.34; 3.09; .314; .187 INJECTION INTRAVENOUS at 00:45

## 2024-01-01 RX ADMIN — IPRATROPIUM BROMIDE AND ALBUTEROL SULFATE 3 ML: 2.5; .5 SOLUTION RESPIRATORY (INHALATION) at 19:51

## 2024-01-01 RX ADMIN — DEXMEDETOMIDINE HYDROCHLORIDE 1.5 MCG/KG/HR: 4 INJECTION, SOLUTION INTRAVENOUS at 15:41

## 2024-01-01 RX ADMIN — CALCIUM CHLORIDE, MAGNESIUM CHLORIDE, SODIUM CHLORIDE, SODIUM BICARBONATE, POTASSIUM CHLORIDE AND SODIUM PHOSPHATE DIBASIC DIHYDRATE 1500 ML/HR: 3.68; 3.05; 6.34; 3.09; .314; .187 INJECTION INTRAVENOUS at 09:14

## 2024-01-01 RX ADMIN — CALCIUM GLUCONATE 2000 MG: 20 INJECTION, SOLUTION INTRAVENOUS at 08:55

## 2024-01-01 RX ADMIN — INSULIN HUMAN 4 UNITS: 100 INJECTION, SOLUTION PARENTERAL at 00:43

## 2024-01-01 RX ADMIN — LEVOTHYROXINE SODIUM 25 MCG: 25 TABLET ORAL at 05:21

## 2024-01-01 RX ADMIN — DEXMEDETOMIDINE HYDROCHLORIDE 1.5 MCG/KG/HR: 4 INJECTION, SOLUTION INTRAVENOUS at 22:00

## 2024-01-01 RX ADMIN — CITALOPRAM 20 MG: 20 TABLET, FILM COATED ORAL at 10:12

## 2024-01-01 RX ADMIN — LORAZEPAM 2 MG: 2 INJECTION INTRAMUSCULAR; INTRAVENOUS at 13:02

## 2024-01-01 RX ADMIN — PROPOFOL INJECTABLE EMULSION 50 MCG/KG/MIN: 10 INJECTION, EMULSION INTRAVENOUS at 18:11

## 2024-01-01 RX ADMIN — CALCIUM CHLORIDE, MAGNESIUM CHLORIDE, SODIUM CHLORIDE, SODIUM BICARBONATE, POTASSIUM CHLORIDE AND SODIUM PHOSPHATE DIBASIC DIHYDRATE 1500 ML/HR: 3.68; 3.05; 6.34; 3.09; .314; .187 INJECTION INTRAVENOUS at 01:59

## 2024-01-01 RX ADMIN — Medication 10 ML: at 10:06

## 2024-01-01 RX ADMIN — POTASSIUM CHLORIDE 10 MEQ: 7.46 INJECTION, SOLUTION INTRAVENOUS at 03:04

## 2024-01-01 RX ADMIN — METRONIDAZOLE 500 MG: 500 INJECTION, SOLUTION INTRAVENOUS at 03:24

## 2024-01-01 RX ADMIN — IPRATROPIUM BROMIDE AND ALBUTEROL SULFATE 3 ML: 2.5; .5 SOLUTION RESPIRATORY (INHALATION) at 21:23

## 2024-01-01 RX ADMIN — SODIUM BICARBONATE 75 ML/HR: 84 INJECTION, SOLUTION INTRAVENOUS at 08:41

## 2024-01-01 RX ADMIN — CALCIUM CHLORIDE, MAGNESIUM CHLORIDE, SODIUM CHLORIDE, SODIUM BICARBONATE, POTASSIUM CHLORIDE AND SODIUM PHOSPHATE DIBASIC DIHYDRATE 1500 ML/HR: 3.68; 3.05; 6.34; 3.09; .314; .187 INJECTION INTRAVENOUS at 17:25

## 2024-01-01 RX ADMIN — IPRATROPIUM BROMIDE AND ALBUTEROL SULFATE 3 ML: 2.5; .5 SOLUTION RESPIRATORY (INHALATION) at 07:10

## 2024-01-01 RX ADMIN — HEPARIN SODIUM 5000 UNITS: 5000 INJECTION INTRAVENOUS; SUBCUTANEOUS at 06:37

## 2024-01-01 RX ADMIN — CALCIUM CHLORIDE, MAGNESIUM CHLORIDE, SODIUM CHLORIDE, SODIUM BICARBONATE, POTASSIUM CHLORIDE AND SODIUM PHOSPHATE DIBASIC DIHYDRATE 2000 ML/HR: 3.68; 3.05; 6.34; 3.09; .314; .187 INJECTION INTRAVENOUS at 18:45

## 2024-01-01 RX ADMIN — ACETAMINOPHEN 650 MG: 650 LIQUID ORAL at 13:17

## 2024-01-01 RX ADMIN — METRONIDAZOLE 500 MG: 500 INJECTION, SOLUTION INTRAVENOUS at 20:20

## 2024-01-01 RX ADMIN — DEXMEDETOMIDINE HYDROCHLORIDE 1.5 MCG/KG/HR: 4 INJECTION, SOLUTION INTRAVENOUS at 09:43

## 2024-01-01 RX ADMIN — CEFTRIAXONE 2000 MG: 2 INJECTION, POWDER, FOR SOLUTION INTRAMUSCULAR; INTRAVENOUS at 02:27

## 2024-01-01 RX ADMIN — POTASSIUM CHLORIDE 10 MEQ: 7.46 INJECTION, SOLUTION INTRAVENOUS at 03:22

## 2024-01-01 RX ADMIN — LORAZEPAM 1 MG: 2 INJECTION INTRAMUSCULAR; INTRAVENOUS at 22:52

## 2024-01-01 RX ADMIN — DEXMEDETOMIDINE HYDROCHLORIDE 1.5 MCG/KG/HR: 4 INJECTION, SOLUTION INTRAVENOUS at 18:46

## 2024-01-01 RX ADMIN — CALCIUM CHLORIDE, MAGNESIUM CHLORIDE, SODIUM CHLORIDE, SODIUM BICARBONATE, POTASSIUM CHLORIDE AND SODIUM PHOSPHATE DIBASIC DIHYDRATE 1500 ML/HR: 3.68; 3.05; 6.34; 3.09; .314; .187 INJECTION INTRAVENOUS at 02:40

## 2024-01-01 RX ADMIN — IPRATROPIUM BROMIDE AND ALBUTEROL SULFATE 3 ML: 2.5; .5 SOLUTION RESPIRATORY (INHALATION) at 11:08

## 2024-01-01 RX ADMIN — Medication 10 ML: at 08:29

## 2024-01-01 RX ADMIN — PROPOFOL INJECTABLE EMULSION 35 MCG/KG/MIN: 10 INJECTION, EMULSION INTRAVENOUS at 15:02

## 2024-01-01 RX ADMIN — NOREPINEPHRINE BITARTRATE 0.18 MCG/KG/MIN: 1 SOLUTION INTRAVENOUS at 03:15

## 2024-01-01 RX ADMIN — VANCOMYCIN HYDROCHLORIDE 1750 MG: 10 INJECTION, POWDER, LYOPHILIZED, FOR SOLUTION INTRAVENOUS at 19:46

## 2024-01-01 RX ADMIN — HEPARIN 300 UNITS: 100 SYRINGE at 12:29

## 2024-01-01 RX ADMIN — METRONIDAZOLE 500 MG: 500 INJECTION, SOLUTION INTRAVENOUS at 04:19

## 2024-01-01 RX ADMIN — LORAZEPAM 2 MG: 2 INJECTION INTRAMUSCULAR; INTRAVENOUS at 14:30

## 2024-01-01 RX ADMIN — ACETAMINOPHEN 650 MG: 650 LIQUID ORAL at 17:38

## 2024-01-01 RX ADMIN — HEPARIN SODIUM 5000 UNITS: 5000 INJECTION INTRAVENOUS; SUBCUTANEOUS at 06:07

## 2024-01-01 RX ADMIN — LORAZEPAM 1 MG: 2 INJECTION INTRAMUSCULAR; INTRAVENOUS at 22:44

## 2024-01-01 RX ADMIN — CHLORHEXIDINE GLUCONATE 15 ML: 1.2 RINSE ORAL at 08:06

## 2024-01-01 RX ADMIN — DEXMEDETOMIDINE HYDROCHLORIDE 1.5 MCG/KG/HR: 4 INJECTION, SOLUTION INTRAVENOUS at 22:46

## 2024-01-01 RX ADMIN — IPRATROPIUM BROMIDE AND ALBUTEROL SULFATE 3 ML: 2.5; .5 SOLUTION RESPIRATORY (INHALATION) at 15:29

## 2024-01-01 RX ADMIN — CALCIUM CHLORIDE, MAGNESIUM CHLORIDE, SODIUM CHLORIDE, SODIUM BICARBONATE, POTASSIUM CHLORIDE AND SODIUM PHOSPHATE DIBASIC DIHYDRATE 2000 ML/HR: 3.68; 3.05; 6.34; 3.09; .314; .187 INJECTION INTRAVENOUS at 16:55

## 2024-01-01 RX ADMIN — ASPIRIN 81 MG: 81 TABLET, CHEWABLE ORAL at 08:08

## 2024-01-01 RX ADMIN — SODIUM BICARBONATE 150 MEQ: 84 INJECTION, SOLUTION INTRAVENOUS at 09:11

## 2024-01-01 RX ADMIN — Medication 100 MG: at 08:10

## 2024-01-01 RX ADMIN — METRONIDAZOLE 500 MG: 500 INJECTION, SOLUTION INTRAVENOUS at 03:30

## 2024-01-01 RX ADMIN — DEXMEDETOMIDINE HYDROCHLORIDE 1.5 MCG/KG/HR: 4 INJECTION, SOLUTION INTRAVENOUS at 06:07

## 2024-01-01 RX ADMIN — LEVOTHYROXINE SODIUM 25 MCG: 25 TABLET ORAL at 06:07

## 2024-01-01 RX ADMIN — CEFEPIME 2000 MG: 2 INJECTION, POWDER, FOR SOLUTION INTRAVENOUS at 22:53

## 2024-01-01 RX ADMIN — METRONIDAZOLE 500 MG: 500 INJECTION, SOLUTION INTRAVENOUS at 21:17

## 2024-01-01 RX ADMIN — LORAZEPAM 1 MG: 2 INJECTION INTRAMUSCULAR; INTRAVENOUS at 04:20

## 2024-01-01 RX ADMIN — HEPARIN SODIUM 5000 UNITS: 5000 INJECTION INTRAVENOUS; SUBCUTANEOUS at 06:19

## 2024-01-01 RX ADMIN — IPRATROPIUM BROMIDE AND ALBUTEROL SULFATE 3 ML: 2.5; .5 SOLUTION RESPIRATORY (INHALATION) at 07:21

## 2024-01-01 RX ADMIN — SODIUM BICARBONATE 100 MEQ: 84 INJECTION INTRAVENOUS at 08:54

## 2024-01-01 RX ADMIN — NOREPINEPHRINE BITARTRATE 0.18 MCG/KG/MIN: 1 SOLUTION INTRAVENOUS at 05:37

## 2024-01-01 RX ADMIN — IPRATROPIUM BROMIDE AND ALBUTEROL SULFATE 3 ML: 2.5; .5 SOLUTION RESPIRATORY (INHALATION) at 15:42

## 2024-01-01 RX ADMIN — POTASSIUM CHLORIDE 40 MEQ: 1.5 FOR SOLUTION ORAL at 20:41

## 2024-01-01 RX ADMIN — METRONIDAZOLE 500 MG: 500 INJECTION, SOLUTION INTRAVENOUS at 11:25

## 2024-01-01 RX ADMIN — METRONIDAZOLE 500 MG: 500 INJECTION, SOLUTION INTRAVENOUS at 11:06

## 2024-01-01 RX ADMIN — HEPARIN SODIUM 5000 UNITS: 5000 INJECTION INTRAVENOUS; SUBCUTANEOUS at 21:01

## 2024-01-01 RX ADMIN — FENTANYL CITRATE 50 MCG: 50 INJECTION, SOLUTION INTRAMUSCULAR; INTRAVENOUS at 08:05

## 2024-01-01 RX ADMIN — DEXMEDETOMIDINE HYDROCHLORIDE 1.5 MCG/KG/HR: 4 INJECTION, SOLUTION INTRAVENOUS at 19:52

## 2024-01-01 RX ADMIN — GLYCOPYRROLATE 0.4 MG: 0.2 INJECTION INTRAMUSCULAR; INTRAVENOUS at 13:11

## 2024-01-01 RX ADMIN — Medication 10 ML: at 08:11

## 2024-01-01 RX ADMIN — METRONIDAZOLE 500 MG: 500 INJECTION, SOLUTION INTRAVENOUS at 04:31

## 2024-01-01 RX ADMIN — BISACODYL 10 MG: 10 SUPPOSITORY RECTAL at 08:19

## 2024-01-01 RX ADMIN — METRONIDAZOLE 500 MG: 500 INJECTION, SOLUTION INTRAVENOUS at 11:55

## 2024-01-01 RX ADMIN — CEFEPIME 2000 MG: 2 INJECTION, POWDER, FOR SOLUTION INTRAVENOUS at 15:05

## 2024-01-01 RX ADMIN — IPRATROPIUM BROMIDE AND ALBUTEROL SULFATE 3 ML: 2.5; .5 SOLUTION RESPIRATORY (INHALATION) at 15:19

## 2024-01-01 RX ADMIN — THIAMINE HYDROCHLORIDE 200 MG: 100 INJECTION, SOLUTION INTRAMUSCULAR; INTRAVENOUS at 08:06

## 2024-01-01 RX ADMIN — CITALOPRAM 20 MG: 20 TABLET, FILM COATED ORAL at 08:08

## 2024-01-01 RX ADMIN — CHLORHEXIDINE GLUCONATE 15 ML: 1.2 RINSE ORAL at 10:06

## 2024-01-01 RX ADMIN — PROPOFOL INJECTABLE EMULSION 45 MCG/KG/MIN: 10 INJECTION, EMULSION INTRAVENOUS at 20:38

## 2024-01-01 RX ADMIN — CHLORHEXIDINE GLUCONATE 15 ML: 1.2 RINSE ORAL at 11:36

## 2024-01-01 RX ADMIN — ATORVASTATIN CALCIUM 20 MG: 20 TABLET, FILM COATED ORAL at 21:19

## 2024-01-01 RX ADMIN — THIAMINE HYDROCHLORIDE 200 MG: 100 INJECTION, SOLUTION INTRAMUSCULAR; INTRAVENOUS at 16:08

## 2024-01-01 RX ADMIN — CEFEPIME 2000 MG: 2 INJECTION, POWDER, FOR SOLUTION INTRAVENOUS at 18:45

## 2024-01-01 RX ADMIN — VANCOMYCIN HYDROCHLORIDE 750 MG: 750 INJECTION, POWDER, LYOPHILIZED, FOR SOLUTION INTRAVENOUS at 08:18

## 2024-01-01 RX ADMIN — POTASSIUM CHLORIDE 20 MEQ: 1.5 FOR SOLUTION ORAL at 10:20

## 2024-01-01 RX ADMIN — HEPARIN SODIUM 5000 UNITS: 5000 INJECTION INTRAVENOUS; SUBCUTANEOUS at 05:01

## 2024-01-01 RX ADMIN — IPRATROPIUM BROMIDE AND ALBUTEROL SULFATE 3 ML: 2.5; .5 SOLUTION RESPIRATORY (INHALATION) at 16:39

## 2024-01-01 RX ADMIN — CHLORHEXIDINE GLUCONATE 15 ML: 1.2 RINSE ORAL at 08:10

## 2024-01-01 RX ADMIN — DEXMEDETOMIDINE HYDROCHLORIDE 1 MCG/KG/HR: 4 INJECTION, SOLUTION INTRAVENOUS at 14:11

## 2024-01-01 RX ADMIN — SODIUM CHLORIDE, POTASSIUM CHLORIDE, SODIUM LACTATE AND CALCIUM CHLORIDE 150 ML/HR: 600; 310; 30; 20 INJECTION, SOLUTION INTRAVENOUS at 03:21

## 2024-01-01 RX ADMIN — CHLORHEXIDINE GLUCONATE 15 ML: 1.2 RINSE ORAL at 08:05

## 2024-01-01 RX ADMIN — Medication 10 ML: at 08:05

## 2024-01-01 RX ADMIN — FOLIC ACID 1 MG: 1 TABLET ORAL at 09:10

## 2024-01-01 RX ADMIN — PHENYLEPHRINE HYDROCHLORIDE 1 MCG/KG/MIN: 10 INJECTION INTRAVENOUS at 22:14

## 2024-01-01 RX ADMIN — METRONIDAZOLE 500 MG: 500 INJECTION, SOLUTION INTRAVENOUS at 11:35

## 2024-01-01 RX ADMIN — Medication 10 ML: at 21:17

## 2024-01-01 RX ADMIN — CEFEPIME 2000 MG: 2 INJECTION, POWDER, FOR SOLUTION INTRAVENOUS at 20:00

## 2024-01-01 RX ADMIN — DEXMEDETOMIDINE HYDROCHLORIDE 1.5 MCG/KG/HR: 4 INJECTION, SOLUTION INTRAVENOUS at 00:53

## 2024-01-01 RX ADMIN — CEFEPIME 2000 MG: 2 INJECTION, POWDER, FOR SOLUTION INTRAVENOUS at 06:54

## 2024-01-01 RX ADMIN — Medication 0.24 MCG/KG/MIN: at 22:08

## 2024-01-01 RX ADMIN — CALCIUM CHLORIDE, MAGNESIUM CHLORIDE, SODIUM CHLORIDE, SODIUM BICARBONATE, POTASSIUM CHLORIDE AND SODIUM PHOSPHATE DIBASIC DIHYDRATE 1500 ML/HR: 3.68; 3.05; 6.34; 3.09; .314; .187 INJECTION INTRAVENOUS at 18:57

## 2024-01-01 RX ADMIN — FOLIC ACID 1 MG: 1 TABLET ORAL at 10:12

## 2024-01-01 RX ADMIN — METRONIDAZOLE 500 MG: 500 INJECTION, SOLUTION INTRAVENOUS at 11:50

## 2024-01-01 RX ADMIN — FENTANYL CITRATE 50 MCG: 50 INJECTION, SOLUTION INTRAMUSCULAR; INTRAVENOUS at 04:03

## 2024-01-01 RX ADMIN — DEXMEDETOMIDINE HYDROCHLORIDE 1.5 MCG/KG/HR: 4 INJECTION, SOLUTION INTRAVENOUS at 13:22

## 2024-01-01 RX ADMIN — HEPARIN SODIUM 5000 UNITS: 5000 INJECTION INTRAVENOUS; SUBCUTANEOUS at 05:21

## 2024-01-01 RX ADMIN — FENTANYL CITRATE 50 MCG: 50 INJECTION, SOLUTION INTRAMUSCULAR; INTRAVENOUS at 15:26

## 2024-01-01 RX ADMIN — THIAMINE HYDROCHLORIDE 200 MG: 100 INJECTION, SOLUTION INTRAMUSCULAR; INTRAVENOUS at 16:36

## 2024-01-01 RX ADMIN — CALCIUM CHLORIDE, MAGNESIUM CHLORIDE, SODIUM CHLORIDE, SODIUM BICARBONATE, POTASSIUM CHLORIDE AND SODIUM PHOSPHATE DIBASIC DIHYDRATE 1500 ML/HR: 3.68; 3.05; 6.34; 3.09; .314; .187 INJECTION INTRAVENOUS at 02:39

## 2024-01-01 RX ADMIN — THIAMINE HYDROCHLORIDE 200 MG: 100 INJECTION, SOLUTION INTRAMUSCULAR; INTRAVENOUS at 22:16

## 2024-01-01 RX ADMIN — CALCIUM CHLORIDE, MAGNESIUM CHLORIDE, SODIUM CHLORIDE, SODIUM BICARBONATE, POTASSIUM CHLORIDE AND SODIUM PHOSPHATE DIBASIC DIHYDRATE 1500 ML/HR: 3.68; 3.05; 6.34; 3.09; .314; .187 INJECTION INTRAVENOUS at 13:02

## 2024-01-01 RX ADMIN — HEPARIN 300 UNITS: 100 SYRINGE at 18:05

## 2024-01-01 RX ADMIN — CITALOPRAM 20 MG: 20 TABLET, FILM COATED ORAL at 08:06

## 2024-01-01 RX ADMIN — IPRATROPIUM BROMIDE AND ALBUTEROL SULFATE 3 ML: 2.5; .5 SOLUTION RESPIRATORY (INHALATION) at 11:03

## 2024-01-01 RX ADMIN — HEPARIN SODIUM 1300 UNITS: 1000 INJECTION, SOLUTION INTRAVENOUS; SUBCUTANEOUS at 05:24

## 2024-01-01 RX ADMIN — LORAZEPAM 2 MG: 2 INJECTION INTRAMUSCULAR; INTRAVENOUS at 06:07

## 2024-01-01 RX ADMIN — DEXMEDETOMIDINE HYDROCHLORIDE 1.2 MCG/KG/HR: 4 INJECTION, SOLUTION INTRAVENOUS at 13:04

## 2024-01-01 RX ADMIN — METRONIDAZOLE 500 MG: 500 INJECTION, SOLUTION INTRAVENOUS at 20:27

## 2024-01-01 RX ADMIN — LORAZEPAM 1 MG: 2 INJECTION INTRAMUSCULAR; INTRAVENOUS at 08:26

## 2024-01-01 RX ADMIN — HEPARIN SODIUM 5000 UNITS: 5000 INJECTION INTRAVENOUS; SUBCUTANEOUS at 13:50

## 2024-01-01 RX ADMIN — Medication 10 ML: at 21:19

## 2024-01-01 RX ADMIN — MAGNESIUM SULFATE HEPTAHYDRATE 2 G: 40 INJECTION, SOLUTION INTRAVENOUS at 01:51

## 2024-01-01 RX ADMIN — LIDOCAINE HYDROCHLORIDE 20 ML: 10 INJECTION, SOLUTION INFILTRATION; PERINEURAL at 22:20

## 2024-01-01 RX ADMIN — CHLORHEXIDINE GLUCONATE 15 ML: 1.2 RINSE ORAL at 08:59

## 2024-01-01 RX ADMIN — PHENYLEPHRINE HYDROCHLORIDE 1.3 MCG/KG/MIN: 10 INJECTION INTRAVENOUS at 06:20

## 2024-01-01 RX ADMIN — HEPARIN SODIUM 5000 UNITS: 5000 INJECTION INTRAVENOUS; SUBCUTANEOUS at 06:35

## 2024-01-01 RX ADMIN — CALCIUM GLUCONATE 2000 MG: 20 INJECTION, SOLUTION INTRAVENOUS at 11:33

## 2024-01-01 RX ADMIN — HEPARIN SODIUM 5000 UNITS: 5000 INJECTION INTRAVENOUS; SUBCUTANEOUS at 06:14

## 2024-01-01 RX ADMIN — Medication 0.02 MCG/KG/MIN: at 21:10

## 2024-01-01 RX ADMIN — CALCIUM CHLORIDE, MAGNESIUM CHLORIDE, SODIUM CHLORIDE, SODIUM BICARBONATE, POTASSIUM CHLORIDE AND SODIUM PHOSPHATE DIBASIC DIHYDRATE 1500 ML/HR: 3.68; 3.05; 6.34; 3.09; .314; .187 INJECTION INTRAVENOUS at 16:00

## 2024-01-01 RX ADMIN — POTASSIUM CHLORIDE 10 MEQ: 7.46 INJECTION, SOLUTION INTRAVENOUS at 04:27

## 2024-01-01 RX ADMIN — IPRATROPIUM BROMIDE AND ALBUTEROL SULFATE 3 ML: 2.5; .5 SOLUTION RESPIRATORY (INHALATION) at 15:51

## 2024-01-01 RX ADMIN — CEFEPIME 2000 MG: 2 INJECTION, POWDER, FOR SOLUTION INTRAVENOUS at 23:51

## 2024-01-01 RX ADMIN — LEVOTHYROXINE SODIUM 25 MCG: 25 TABLET ORAL at 06:14

## 2024-01-01 RX ADMIN — FOLIC ACID 1 MG: 1 TABLET ORAL at 08:10

## 2024-01-01 RX ADMIN — ACETAMINOPHEN 650 MG: 650 SUPPOSITORY RECTAL at 08:58

## 2024-01-01 RX ADMIN — CALCIUM CHLORIDE, MAGNESIUM CHLORIDE, SODIUM CHLORIDE, SODIUM BICARBONATE, POTASSIUM CHLORIDE AND SODIUM PHOSPHATE DIBASIC DIHYDRATE 1500 ML/HR: 3.68; 3.05; 6.34; 3.09; .314; .187 INJECTION INTRAVENOUS at 08:29

## 2024-01-01 RX ADMIN — INSULIN HUMAN 3 UNITS: 100 INJECTION, SOLUTION PARENTERAL at 06:14

## 2024-01-01 RX ADMIN — IPRATROPIUM BROMIDE AND ALBUTEROL SULFATE 3 ML: 2.5; .5 SOLUTION RESPIRATORY (INHALATION) at 19:30

## 2024-01-01 RX ADMIN — Medication 10 ML: at 20:42

## 2024-01-01 RX ADMIN — POTASSIUM CHLORIDE 40 MEQ: 750 TABLET, EXTENDED RELEASE ORAL at 10:14

## 2024-01-01 RX ADMIN — SODIUM BICARBONATE 50 MEQ: 84 INJECTION INTRAVENOUS at 16:33

## 2024-01-01 RX ADMIN — Medication 0.14 MCG/KG/MIN: at 06:26

## 2024-01-01 RX ADMIN — METRONIDAZOLE 500 MG: 500 INJECTION, SOLUTION INTRAVENOUS at 19:26

## 2024-01-01 RX ADMIN — HEPARIN SODIUM 5000 UNITS: 5000 INJECTION INTRAVENOUS; SUBCUTANEOUS at 21:19

## 2024-01-01 RX ADMIN — LEVOTHYROXINE SODIUM 25 MCG: 25 TABLET ORAL at 05:01

## 2024-01-01 RX ADMIN — ATORVASTATIN CALCIUM 20 MG: 20 TABLET, FILM COATED ORAL at 21:14

## 2024-01-01 RX ADMIN — PROPOFOL INJECTABLE EMULSION 5 MCG/KG/MIN: 10 INJECTION, EMULSION INTRAVENOUS at 08:05

## 2024-01-01 RX ADMIN — LORAZEPAM 1 MG: 1 TABLET ORAL at 15:32

## 2024-01-01 RX ADMIN — Medication 10 ML: at 20:03

## 2024-01-01 RX ADMIN — VANCOMYCIN HYDROCHLORIDE 1500 MG: 10 INJECTION, POWDER, LYOPHILIZED, FOR SOLUTION INTRAVENOUS at 11:51

## 2024-01-01 RX ADMIN — THIAMINE HYDROCHLORIDE 200 MG: 100 INJECTION, SOLUTION INTRAMUSCULAR; INTRAVENOUS at 17:02

## 2024-01-01 RX ADMIN — IPRATROPIUM BROMIDE AND ALBUTEROL SULFATE 3 ML: 2.5; .5 SOLUTION RESPIRATORY (INHALATION) at 07:57

## 2024-01-01 RX ADMIN — ACETAMINOPHEN 650 MG: 650 LIQUID ORAL at 08:20

## 2024-01-01 RX ADMIN — IPRATROPIUM BROMIDE AND ALBUTEROL SULFATE 3 ML: 2.5; .5 SOLUTION RESPIRATORY (INHALATION) at 20:13

## 2024-01-01 RX ADMIN — FENTANYL CITRATE 50 MCG: 50 INJECTION, SOLUTION INTRAMUSCULAR; INTRAVENOUS at 05:35

## 2024-01-01 RX ADMIN — DEXMEDETOMIDINE HYDROCHLORIDE 0.5 MCG/KG/HR: 4 INJECTION, SOLUTION INTRAVENOUS at 05:02

## 2024-01-01 RX ADMIN — PROPOFOL INJECTABLE EMULSION 50 MCG/KG/MIN: 10 INJECTION, EMULSION INTRAVENOUS at 09:50

## 2024-01-01 RX ADMIN — PERFLUTREN 3.5 ML: 6.52 INJECTION, SUSPENSION INTRAVENOUS at 09:52

## 2024-01-01 RX ADMIN — HEPARIN 300 UNITS: 100 SYRINGE at 11:31

## 2024-01-01 RX ADMIN — NOREPINEPHRINE BITARTRATE 0.24 MCG/KG/MIN: 1 SOLUTION INTRAVENOUS at 15:20

## 2024-01-01 RX ADMIN — DEXMEDETOMIDINE HYDROCHLORIDE 1.2 MCG/KG/HR: 4 INJECTION, SOLUTION INTRAVENOUS at 05:12

## 2024-01-01 RX ADMIN — SODIUM ZIRCONIUM CYCLOSILICATE 10 G: 10 POWDER, FOR SUSPENSION ORAL at 20:36

## 2024-01-01 RX ADMIN — FENTANYL CITRATE 50 MCG: 50 INJECTION, SOLUTION INTRAMUSCULAR; INTRAVENOUS at 05:09

## 2024-01-01 RX ADMIN — POTASSIUM CHLORIDE 10 MEQ: 7.46 INJECTION, SOLUTION INTRAVENOUS at 23:43

## 2024-01-01 RX ADMIN — FOLIC ACID 1 MG: 1 TABLET ORAL at 08:08

## 2024-01-01 RX ADMIN — LORAZEPAM 1 MG: 1 TABLET ORAL at 08:38

## 2024-01-01 RX ADMIN — DEXMEDETOMIDINE HYDROCHLORIDE 1.5 MCG/KG/HR: 4 INJECTION, SOLUTION INTRAVENOUS at 03:05

## 2024-01-01 RX ADMIN — Medication 10 ML: at 00:15

## 2024-01-01 RX ADMIN — CALCIUM CHLORIDE, MAGNESIUM CHLORIDE, SODIUM CHLORIDE, SODIUM BICARBONATE, POTASSIUM CHLORIDE AND SODIUM PHOSPHATE DIBASIC DIHYDRATE 1500 ML/HR: 3.68; 3.05; 6.34; 3.09; .314; .187 INJECTION INTRAVENOUS at 00:47

## 2024-01-01 RX ADMIN — BUMETANIDE 2 MG: 0.25 INJECTION INTRAMUSCULAR; INTRAVENOUS at 20:03

## 2024-01-01 RX ADMIN — POTASSIUM CHLORIDE 10 MEQ: 7.46 INJECTION, SOLUTION INTRAVENOUS at 05:30

## 2024-01-01 RX ADMIN — THIAMINE HYDROCHLORIDE 200 MG: 100 INJECTION, SOLUTION INTRAMUSCULAR; INTRAVENOUS at 22:52

## 2024-01-01 RX ADMIN — ACETAMINOPHEN 650 MG: 650 SUPPOSITORY RECTAL at 04:34

## 2024-01-01 RX ADMIN — CEFEPIME 2000 MG: 2 INJECTION, POWDER, FOR SOLUTION INTRAVENOUS at 06:43

## 2024-01-01 RX ADMIN — Medication 0.18 MCG/KG/MIN: at 05:13

## 2024-01-01 RX ADMIN — FOLIC ACID 1 MG: 1 TABLET ORAL at 09:18

## 2024-01-01 RX ADMIN — NOREPINEPHRINE BITARTRATE 0.22 MCG/KG/MIN: 1 SOLUTION INTRAVENOUS at 23:30

## 2024-01-01 RX ADMIN — HEPARIN SODIUM 2000 UNITS: 1000 INJECTION, SOLUTION INTRAVENOUS; SUBCUTANEOUS at 11:29

## 2024-01-01 RX ADMIN — DEXMEDETOMIDINE HYDROCHLORIDE 1.5 MCG/KG/HR: 4 INJECTION, SOLUTION INTRAVENOUS at 01:33

## 2024-01-01 RX ADMIN — PANTOPRAZOLE SODIUM 40 MG: 40 INJECTION, POWDER, FOR SOLUTION INTRAVENOUS at 08:25

## 2024-01-01 RX ADMIN — LEVOTHYROXINE SODIUM 25 MCG: 25 TABLET ORAL at 06:15

## 2024-01-01 RX ADMIN — PROPOFOL INJECTABLE EMULSION 50 MCG/KG/MIN: 10 INJECTION, EMULSION INTRAVENOUS at 20:19

## 2024-01-01 RX ADMIN — ERGOCALCIFEROL 50000 UNITS: 1.25 CAPSULE ORAL at 17:02

## 2024-01-01 RX ADMIN — HEPARIN SODIUM 5000 UNITS: 5000 INJECTION INTRAVENOUS; SUBCUTANEOUS at 22:12

## 2024-01-01 RX ADMIN — CHLORHEXIDINE GLUCONATE 15 ML: 1.2 RINSE ORAL at 21:00

## 2024-01-01 RX ADMIN — CEFEPIME 2000 MG: 2 INJECTION, POWDER, FOR SOLUTION INTRAVENOUS at 06:30

## 2024-01-01 RX ADMIN — IPRATROPIUM BROMIDE AND ALBUTEROL SULFATE 3 ML: 2.5; .5 SOLUTION RESPIRATORY (INHALATION) at 07:46

## 2024-01-01 RX ADMIN — THIAMINE HYDROCHLORIDE 200 MG: 100 INJECTION, SOLUTION INTRAMUSCULAR; INTRAVENOUS at 22:44

## 2024-01-01 RX ADMIN — POTASSIUM CHLORIDE 10 MEQ: 7.46 INJECTION, SOLUTION INTRAVENOUS at 17:02

## 2024-01-01 RX ADMIN — CEFEPIME 2000 MG: 2 INJECTION, POWDER, FOR SOLUTION INTRAVENOUS at 06:34

## 2024-01-01 RX ADMIN — HEPARIN SODIUM 5000 UNITS: 5000 INJECTION INTRAVENOUS; SUBCUTANEOUS at 13:17

## 2024-01-01 RX ADMIN — CHLORHEXIDINE GLUCONATE 15 ML: 1.2 RINSE ORAL at 20:36

## 2024-01-01 RX ADMIN — HEPARIN SODIUM 5000 UNITS: 5000 INJECTION INTRAVENOUS; SUBCUTANEOUS at 14:52

## 2024-01-01 RX ADMIN — SODIUM CHLORIDE 500 ML/HR: 9 INJECTION, SOLUTION INTRAVENOUS at 03:11

## 2024-01-01 RX ADMIN — CHLORHEXIDINE GLUCONATE 15 ML: 1.2 RINSE ORAL at 20:35

## 2024-01-01 RX ADMIN — CHLORHEXIDINE GLUCONATE 15 ML: 1.2 RINSE ORAL at 08:22

## 2024-01-01 RX ADMIN — PROPOFOL INJECTABLE EMULSION 50 MCG/KG/MIN: 10 INJECTION, EMULSION INTRAVENOUS at 14:03

## 2024-01-01 RX ADMIN — VANCOMYCIN HYDROCHLORIDE 750 MG: 750 INJECTION, POWDER, LYOPHILIZED, FOR SOLUTION INTRAVENOUS at 10:02

## 2024-01-01 RX ADMIN — PANTOPRAZOLE SODIUM 40 MG: 40 INJECTION, POWDER, FOR SOLUTION INTRAVENOUS at 08:19

## 2024-01-01 RX ADMIN — CEFEPIME 2000 MG: 2 INJECTION, POWDER, FOR SOLUTION INTRAVENOUS at 06:28

## 2024-01-01 RX ADMIN — DEXMEDETOMIDINE HYDROCHLORIDE 1.5 MCG/KG/HR: 4 INJECTION, SOLUTION INTRAVENOUS at 12:35

## 2024-01-01 RX ADMIN — HEPARIN SODIUM 5000 UNITS: 5000 INJECTION INTRAVENOUS; SUBCUTANEOUS at 06:30

## 2024-01-01 RX ADMIN — CEFEPIME 2000 MG: 2 INJECTION, POWDER, FOR SOLUTION INTRAVENOUS at 15:28

## 2024-01-01 RX ADMIN — DEXMEDETOMIDINE HYDROCHLORIDE 1.5 MCG/KG/HR: 4 INJECTION, SOLUTION INTRAVENOUS at 19:53

## 2024-01-01 RX ADMIN — SODIUM CHLORIDE 1000 ML: 9 INJECTION, SOLUTION INTRAVENOUS at 01:41

## 2024-01-01 RX ADMIN — SODIUM BICARBONATE 150 MEQ: 84 INJECTION, SOLUTION INTRAVENOUS at 18:28

## 2024-01-01 RX ADMIN — IPRATROPIUM BROMIDE AND ALBUTEROL SULFATE 3 ML: 2.5; .5 SOLUTION RESPIRATORY (INHALATION) at 19:54

## 2024-01-01 RX ADMIN — THIAMINE HYDROCHLORIDE 200 MG: 100 INJECTION, SOLUTION INTRAMUSCULAR; INTRAVENOUS at 16:56

## 2024-01-01 RX ADMIN — PROPOFOL INJECTABLE EMULSION 40 MCG/KG/MIN: 10 INJECTION, EMULSION INTRAVENOUS at 06:20

## 2024-01-01 RX ADMIN — DEXMEDETOMIDINE HYDROCHLORIDE 1.5 MCG/KG/HR: 4 INJECTION, SOLUTION INTRAVENOUS at 06:16

## 2024-01-01 RX ADMIN — HEPARIN SODIUM 5000 UNITS: 5000 INJECTION INTRAVENOUS; SUBCUTANEOUS at 22:44

## 2024-01-01 RX ADMIN — LORAZEPAM 2 MG: 2 INJECTION INTRAMUSCULAR; INTRAVENOUS at 06:24

## 2024-01-01 RX ADMIN — Medication 10 ML: at 08:20

## 2024-01-01 RX ADMIN — CALCIUM CHLORIDE, MAGNESIUM CHLORIDE, SODIUM CHLORIDE, SODIUM BICARBONATE, POTASSIUM CHLORIDE AND SODIUM PHOSPHATE DIBASIC DIHYDRATE 1500 ML/HR: 3.68; 3.05; 6.34; 3.09; .314; .187 INJECTION INTRAVENOUS at 13:03

## 2024-01-01 RX ADMIN — SODIUM ZIRCONIUM CYCLOSILICATE 10 G: 10 POWDER, FOR SUSPENSION ORAL at 11:33

## 2024-01-01 RX ADMIN — CALCIUM GLUCONATE 1000 MG: 20 INJECTION, SOLUTION INTRAVENOUS at 16:42

## 2024-01-01 RX ADMIN — DEXMEDETOMIDINE HYDROCHLORIDE 1.5 MCG/KG/HR: 4 INJECTION, SOLUTION INTRAVENOUS at 06:54

## 2024-01-01 RX ADMIN — DEXMEDETOMIDINE HYDROCHLORIDE 1 MCG/KG/HR: 4 INJECTION, SOLUTION INTRAVENOUS at 09:37

## 2024-01-01 RX ADMIN — IPRATROPIUM BROMIDE AND ALBUTEROL SULFATE 3 ML: 2.5; .5 SOLUTION RESPIRATORY (INHALATION) at 10:38

## 2024-01-01 RX ADMIN — Medication 10 ML: at 09:36

## 2024-01-01 RX ADMIN — POTASSIUM CHLORIDE 10 MEQ: 7.46 INJECTION, SOLUTION INTRAVENOUS at 15:32

## 2024-01-01 RX ADMIN — Medication 10 ML: at 20:36

## 2024-01-01 RX ADMIN — BISACODYL 10 MG: 10 SUPPOSITORY RECTAL at 08:04

## 2024-01-01 RX ADMIN — PHENYLEPHRINE HYDROCHLORIDE 0.5 MCG/KG/MIN: 10 INJECTION INTRAVENOUS at 09:06

## 2024-01-01 RX ADMIN — DEXTROSE MONOHYDRATE 25 G: 25 INJECTION, SOLUTION INTRAVENOUS at 09:28

## 2024-01-01 RX ADMIN — PROPOFOL INJECTABLE EMULSION 50 MCG/KG/MIN: 10 INJECTION, EMULSION INTRAVENOUS at 00:57

## 2024-01-01 RX ADMIN — CALCIUM CHLORIDE, MAGNESIUM CHLORIDE, SODIUM CHLORIDE, SODIUM BICARBONATE, POTASSIUM CHLORIDE AND SODIUM PHOSPHATE DIBASIC DIHYDRATE 1500 ML/HR: 3.68; 3.05; 6.34; 3.09; .314; .187 INJECTION INTRAVENOUS at 14:40

## 2024-01-01 RX ADMIN — PROPOFOL INJECTABLE EMULSION 35 MCG/KG/MIN: 10 INJECTION, EMULSION INTRAVENOUS at 06:31

## 2024-01-01 RX ADMIN — Medication 100 MG: at 08:04

## 2024-01-01 RX ADMIN — ASPIRIN 81 MG: 81 TABLET, CHEWABLE ORAL at 08:06

## 2024-01-01 RX ADMIN — METRONIDAZOLE 500 MG: 500 INJECTION, SOLUTION INTRAVENOUS at 19:51

## 2024-01-01 RX ADMIN — DEXMEDETOMIDINE HYDROCHLORIDE 1.5 MCG/KG/HR: 4 INJECTION, SOLUTION INTRAVENOUS at 23:43

## 2024-01-01 RX ADMIN — METRONIDAZOLE 500 MG: 500 INJECTION, SOLUTION INTRAVENOUS at 03:15

## 2024-01-01 RX ADMIN — HEPARIN SODIUM 5000 UNITS: 5000 INJECTION INTRAVENOUS; SUBCUTANEOUS at 14:10

## 2024-01-01 RX ADMIN — IPRATROPIUM BROMIDE AND ALBUTEROL SULFATE 3 ML: 2.5; .5 SOLUTION RESPIRATORY (INHALATION) at 12:08

## 2024-01-01 RX ADMIN — Medication 10 ML: at 21:01

## 2024-01-01 RX ADMIN — THIAMINE HYDROCHLORIDE 200 MG: 100 INJECTION, SOLUTION INTRAMUSCULAR; INTRAVENOUS at 10:09

## 2024-01-01 RX ADMIN — FOLIC ACID 1 MG: 5 INJECTION, SOLUTION INTRAMUSCULAR; INTRAVENOUS; SUBCUTANEOUS at 02:41

## 2024-01-01 RX ADMIN — HEPARIN SODIUM 5000 UNITS: 5000 INJECTION INTRAVENOUS; SUBCUTANEOUS at 15:42

## 2024-01-01 RX ADMIN — VASOPRESSIN 0.03 UNITS/MIN: 20 INJECTION INTRAVENOUS at 18:14

## 2024-01-01 RX ADMIN — POTASSIUM CHLORIDE 10 MEQ: 7.46 INJECTION, SOLUTION INTRAVENOUS at 22:42

## 2024-01-01 RX ADMIN — DEXMEDETOMIDINE HYDROCHLORIDE 1.2 MCG/KG/HR: 4 INJECTION, SOLUTION INTRAVENOUS at 17:23

## 2024-01-01 RX ADMIN — VANCOMYCIN HYDROCHLORIDE 750 MG: 750 INJECTION, POWDER, LYOPHILIZED, FOR SOLUTION INTRAVENOUS at 20:11

## 2024-01-01 RX ADMIN — CITALOPRAM 20 MG: 20 TABLET, FILM COATED ORAL at 09:18

## 2024-01-01 RX ADMIN — CALCIUM CHLORIDE, MAGNESIUM CHLORIDE, SODIUM CHLORIDE, SODIUM BICARBONATE, POTASSIUM CHLORIDE AND SODIUM PHOSPHATE DIBASIC DIHYDRATE 1500 ML/HR: 3.68; 3.05; 6.34; 3.09; .314; .187 INJECTION INTRAVENOUS at 17:24

## 2024-01-01 RX ADMIN — VANCOMYCIN HYDROCHLORIDE 750 MG: 750 INJECTION, POWDER, LYOPHILIZED, FOR SOLUTION INTRAVENOUS at 21:34

## 2024-01-01 RX ADMIN — PANTOPRAZOLE SODIUM 40 MG: 40 INJECTION, POWDER, FOR SOLUTION INTRAVENOUS at 08:03

## 2024-01-01 RX ADMIN — IPRATROPIUM BROMIDE AND ALBUTEROL SULFATE 3 ML: 2.5; .5 SOLUTION RESPIRATORY (INHALATION) at 11:28

## 2024-01-01 RX ADMIN — PHENYLEPHRINE HYDROCHLORIDE 0.5 MCG/KG/MIN: 10 INJECTION INTRAVENOUS at 05:01

## 2024-01-01 RX ADMIN — DEXMEDETOMIDINE HYDROCHLORIDE 1.5 MCG/KG/HR: 4 INJECTION, SOLUTION INTRAVENOUS at 10:17

## 2024-01-01 RX ADMIN — HEPARIN SODIUM 1300 UNITS: 1000 INJECTION, SOLUTION INTRAVENOUS; SUBCUTANEOUS at 05:25

## 2024-01-01 RX ADMIN — CITALOPRAM 20 MG: 20 TABLET, FILM COATED ORAL at 08:24

## 2024-01-01 RX ADMIN — POTASSIUM CHLORIDE 10 MEQ: 7.46 INJECTION, SOLUTION INTRAVENOUS at 00:57

## 2024-01-01 RX ADMIN — BISACODYL 10 MG: 10 SUPPOSITORY RECTAL at 11:45

## 2024-01-01 RX ADMIN — CALCIUM CHLORIDE, MAGNESIUM CHLORIDE, SODIUM CHLORIDE, SODIUM BICARBONATE, POTASSIUM CHLORIDE AND SODIUM PHOSPHATE DIBASIC DIHYDRATE 1500 ML/HR: 3.68; 3.05; 6.34; 3.09; .314; .187 INJECTION INTRAVENOUS at 21:52

## 2024-01-01 RX ADMIN — CHLORHEXIDINE GLUCONATE 15 ML: 1.2 RINSE ORAL at 20:43

## 2024-01-01 RX ADMIN — THIAMINE HYDROCHLORIDE 200 MG: 100 INJECTION, SOLUTION INTRAMUSCULAR; INTRAVENOUS at 08:00

## 2024-01-01 RX ADMIN — HEPARIN SODIUM 5000 UNITS: 5000 INJECTION INTRAVENOUS; SUBCUTANEOUS at 00:26

## 2024-01-01 RX ADMIN — POTASSIUM CHLORIDE 40 MEQ: 750 TABLET, EXTENDED RELEASE ORAL at 01:54

## 2024-01-01 RX ADMIN — PHENYLEPHRINE HYDROCHLORIDE 1 MCG/KG/MIN: 10 INJECTION INTRAVENOUS at 21:39

## 2024-01-01 RX ADMIN — PROPOFOL INJECTABLE EMULSION 40 MCG/KG/MIN: 10 INJECTION, EMULSION INTRAVENOUS at 03:32

## 2024-01-01 RX ADMIN — CHLORHEXIDINE GLUCONATE 15 ML: 1.2 RINSE ORAL at 08:42

## 2024-01-01 RX ADMIN — FOLIC ACID 1 MG: 1 TABLET ORAL at 08:04

## 2024-01-01 RX ADMIN — DEXMEDETOMIDINE HYDROCHLORIDE 0.2 MCG/KG/HR: 4 INJECTION, SOLUTION INTRAVENOUS at 09:00

## 2024-01-01 RX ADMIN — IPRATROPIUM BROMIDE AND ALBUTEROL SULFATE 3 ML: 2.5; .5 SOLUTION RESPIRATORY (INHALATION) at 07:44

## 2024-01-01 RX ADMIN — PANTOPRAZOLE SODIUM 40 MG: 40 INJECTION, POWDER, FOR SOLUTION INTRAVENOUS at 10:06

## 2024-01-01 RX ADMIN — THIAMINE HYDROCHLORIDE 200 MG: 100 INJECTION, SOLUTION INTRAMUSCULAR; INTRAVENOUS at 02:27

## 2024-01-01 RX ADMIN — FENTANYL CITRATE 50 MCG: 50 INJECTION, SOLUTION INTRAMUSCULAR; INTRAVENOUS at 12:51

## 2024-01-01 RX ADMIN — DEXMEDETOMIDINE HYDROCHLORIDE 1.5 MCG/KG/HR: 4 INJECTION, SOLUTION INTRAVENOUS at 16:55

## 2024-01-01 RX ADMIN — POTASSIUM CHLORIDE 40 MEQ: 1.5 FOR SOLUTION ORAL at 06:38

## 2024-01-01 RX ADMIN — Medication 10 ML: at 10:11

## 2024-01-01 RX ADMIN — SODIUM BICARBONATE 150 MEQ: 84 INJECTION, SOLUTION INTRAVENOUS at 00:44

## 2024-01-01 RX ADMIN — ACETAMINOPHEN 650 MG: 650 LIQUID ORAL at 15:41

## 2024-01-01 RX ADMIN — CHLORHEXIDINE GLUCONATE 15 ML: 1.2 RINSE ORAL at 21:01

## 2024-01-01 RX ADMIN — LORAZEPAM 1 MG: 2 INJECTION INTRAMUSCULAR; INTRAVENOUS at 09:03

## 2024-01-01 RX ADMIN — ASPIRIN 81 MG: 81 TABLET, CHEWABLE ORAL at 08:04

## 2024-01-01 RX ADMIN — Medication 0.2 MCG/KG/MIN: at 15:01

## 2024-01-01 RX ADMIN — IPRATROPIUM BROMIDE AND ALBUTEROL SULFATE 3 ML: 2.5; .5 SOLUTION RESPIRATORY (INHALATION) at 16:18

## 2024-01-01 RX ADMIN — IPRATROPIUM BROMIDE AND ALBUTEROL SULFATE 3 ML: 2.5; .5 SOLUTION RESPIRATORY (INHALATION) at 11:39

## 2024-01-01 RX ADMIN — LEVOTHYROXINE SODIUM 25 MCG: 25 TABLET ORAL at 11:42

## 2024-01-01 RX ADMIN — PHENYLEPHRINE HYDROCHLORIDE 1.2 MCG/KG/MIN: 10 INJECTION INTRAVENOUS at 06:20

## 2024-01-01 RX ADMIN — IPRATROPIUM BROMIDE AND ALBUTEROL SULFATE 3 ML: 2.5; .5 SOLUTION RESPIRATORY (INHALATION) at 19:58

## 2024-01-01 RX ADMIN — HEPARIN SODIUM 5000 UNITS: 5000 INJECTION INTRAVENOUS; SUBCUTANEOUS at 14:33

## 2024-01-01 RX ADMIN — BUMETANIDE 4 MG: 0.25 INJECTION INTRAMUSCULAR; INTRAVENOUS at 11:49

## 2024-01-01 RX ADMIN — IPRATROPIUM BROMIDE AND ALBUTEROL SULFATE 3 ML: 2.5; .5 SOLUTION RESPIRATORY (INHALATION) at 20:09

## 2024-01-01 RX ADMIN — DEXTROSE MONOHYDRATE 25 G: 25 INJECTION, SOLUTION INTRAVENOUS at 18:22

## 2024-01-01 RX ADMIN — ERGOCALCIFEROL 50000 UNITS: 1.25 CAPSULE ORAL at 15:21

## 2024-01-01 RX ADMIN — POTASSIUM CHLORIDE 20 MEQ: 1.5 FOR SOLUTION ORAL at 05:59

## 2024-01-01 RX ADMIN — POTASSIUM CHLORIDE 10 MEQ: 7.46 INJECTION, SOLUTION INTRAVENOUS at 21:16

## 2024-01-01 RX ADMIN — IPRATROPIUM BROMIDE AND ALBUTEROL SULFATE 3 ML: 2.5; .5 SOLUTION RESPIRATORY (INHALATION) at 11:14

## 2024-01-01 RX ADMIN — FENTANYL CITRATE 50 MCG: 50 INJECTION, SOLUTION INTRAMUSCULAR; INTRAVENOUS at 22:12

## 2024-01-01 RX ADMIN — FOLIC ACID 1 MG: 1 TABLET ORAL at 08:19

## 2024-01-01 RX ADMIN — DEXMEDETOMIDINE HYDROCHLORIDE 1 MCG/KG/HR: 4 INJECTION, SOLUTION INTRAVENOUS at 18:46

## 2024-01-01 RX ADMIN — SODIUM ZIRCONIUM CYCLOSILICATE 10 G: 10 POWDER, FOR SUSPENSION ORAL at 10:22

## 2024-01-01 RX ADMIN — POTASSIUM CHLORIDE 10 MEQ: 7.46 INJECTION, SOLUTION INTRAVENOUS at 20:41

## 2024-01-01 RX ADMIN — FENTANYL CITRATE 50 MCG: 50 INJECTION, SOLUTION INTRAMUSCULAR; INTRAVENOUS at 01:32

## 2024-01-01 RX ADMIN — SODIUM CHLORIDE 100 ML/HR: 9 INJECTION, SOLUTION INTRAVENOUS at 04:38

## 2024-01-01 RX ADMIN — CEFTRIAXONE 2000 MG: 2 INJECTION, POWDER, FOR SOLUTION INTRAMUSCULAR; INTRAVENOUS at 02:01

## 2024-01-01 RX ADMIN — CALCIUM GLUCONATE 1000 MG: 20 INJECTION, SOLUTION INTRAVENOUS at 19:38

## 2024-01-01 RX ADMIN — SODIUM CHLORIDE 500 ML: 9 INJECTION, SOLUTION INTRAVENOUS at 09:42

## 2024-01-01 RX ADMIN — PHENYLEPHRINE HYDROCHLORIDE 1.5 MCG/KG/MIN: 10 INJECTION INTRAVENOUS at 14:59

## 2024-01-01 RX ADMIN — HEPARIN SODIUM 5000 UNITS: 5000 INJECTION INTRAVENOUS; SUBCUTANEOUS at 00:32

## 2024-01-01 RX ADMIN — PANTOPRAZOLE SODIUM 40 MG: 40 INJECTION, POWDER, FOR SOLUTION INTRAVENOUS at 09:10

## 2024-01-01 RX ADMIN — CHLORHEXIDINE GLUCONATE 15 ML: 1.2 RINSE ORAL at 09:11

## 2024-01-01 RX ADMIN — CITALOPRAM 20 MG: 20 TABLET, FILM COATED ORAL at 08:10

## 2024-01-01 RX ADMIN — DEXMEDETOMIDINE HYDROCHLORIDE 1 MCG/KG/HR: 4 INJECTION, SOLUTION INTRAVENOUS at 01:01

## 2024-01-01 RX ADMIN — HEPARIN 300 UNITS: 100 SYRINGE at 11:32

## 2024-01-01 RX ADMIN — PROPOFOL INJECTABLE EMULSION 50 MCG/KG/MIN: 10 INJECTION, EMULSION INTRAVENOUS at 20:58

## 2024-01-01 RX ADMIN — VASOPRESSIN 0.03 UNITS/MIN: 20 INJECTION INTRAVENOUS at 06:34

## 2024-01-01 RX ADMIN — IPRATROPIUM BROMIDE AND ALBUTEROL SULFATE 3 ML: 2.5; .5 SOLUTION RESPIRATORY (INHALATION) at 12:37

## 2024-01-01 RX ADMIN — FOLIC ACID 1 MG: 1 TABLET ORAL at 08:42

## 2024-01-01 RX ADMIN — CEFEPIME 2000 MG: 2 INJECTION, POWDER, FOR SOLUTION INTRAVENOUS at 08:05

## 2024-01-01 RX ADMIN — IPRATROPIUM BROMIDE AND ALBUTEROL SULFATE 3 ML: 2.5; .5 SOLUTION RESPIRATORY (INHALATION) at 15:39

## 2024-01-01 RX ADMIN — DEXTROSE MONOHYDRATE 25 G: 25 INJECTION, SOLUTION INTRAVENOUS at 06:54

## 2024-01-01 RX ADMIN — Medication 10 ML: at 20:20

## 2024-01-01 RX ADMIN — HEPARIN SODIUM 5000 UNITS: 5000 INJECTION INTRAVENOUS; SUBCUTANEOUS at 21:36

## 2024-01-01 RX ADMIN — DEXMEDETOMIDINE HYDROCHLORIDE 0.6 MCG/KG/HR: 4 INJECTION, SOLUTION INTRAVENOUS at 16:35

## 2024-01-01 RX ADMIN — FENTANYL CITRATE 50 MCG: 50 INJECTION, SOLUTION INTRAMUSCULAR; INTRAVENOUS at 17:45

## 2024-01-01 RX ADMIN — PROPOFOL INJECTABLE EMULSION 50 MCG/KG/MIN: 10 INJECTION, EMULSION INTRAVENOUS at 04:20

## 2024-01-01 RX ADMIN — ASPIRIN 81 MG: 81 TABLET, CHEWABLE ORAL at 08:10

## 2024-01-01 RX ADMIN — PROPOFOL INJECTABLE EMULSION 40 MCG/KG/MIN: 10 INJECTION, EMULSION INTRAVENOUS at 14:47

## 2024-01-01 RX ADMIN — METRONIDAZOLE 500 MG: 500 INJECTION, SOLUTION INTRAVENOUS at 19:38

## 2024-01-01 RX ADMIN — PROPOFOL INJECTABLE EMULSION 40 MCG/KG/MIN: 10 INJECTION, EMULSION INTRAVENOUS at 22:43

## 2024-01-01 RX ADMIN — CITALOPRAM 20 MG: 20 TABLET, FILM COATED ORAL at 08:42

## 2024-01-01 RX ADMIN — FENTANYL CITRATE 50 MCG: 50 INJECTION, SOLUTION INTRAMUSCULAR; INTRAVENOUS at 23:30

## 2024-01-01 RX ADMIN — BUMETANIDE 2 MG: 0.25 INJECTION INTRAMUSCULAR; INTRAVENOUS at 11:35

## 2024-01-01 RX ADMIN — SODIUM BICARBONATE 75 ML/HR: 84 INJECTION, SOLUTION INTRAVENOUS at 19:52

## 2024-01-01 RX ADMIN — FENTANYL CITRATE 50 MCG: 50 INJECTION, SOLUTION INTRAMUSCULAR; INTRAVENOUS at 00:25

## 2024-01-01 RX ADMIN — METRONIDAZOLE 500 MG: 500 INJECTION, SOLUTION INTRAVENOUS at 05:01

## 2024-09-18 PROBLEM — F10.90 ALCOHOL USE DISORDER: Status: ACTIVE | Noted: 2024-01-01

## 2024-09-18 PROBLEM — E87.6 HYPOKALEMIA: Status: ACTIVE | Noted: 2024-01-01

## 2024-09-18 PROBLEM — D53.9 MACROCYTIC ANEMIA: Status: ACTIVE | Noted: 2024-01-01

## 2024-09-18 PROBLEM — N17.9 AKI (ACUTE KIDNEY INJURY): Status: ACTIVE | Noted: 2024-01-01

## 2024-09-18 PROBLEM — I25.10 CAD (CORONARY ARTERY DISEASE): Status: ACTIVE | Noted: 2024-01-01

## 2024-09-18 PROBLEM — A41.9 SEPSIS: Status: ACTIVE | Noted: 2024-01-01

## 2024-09-18 NOTE — PROGRESS NOTES
ABG with compensated metabolic anion gap acidosis versus respiratory alkalosis. Delta delta ratio AG to bicarb is 1 indicating metabolic acidosis . Possible starvation versus alcoholic ketoacidosis. Sending lactic acid, B-OH butyrate level, salicylate.  Transferring to ICU. On NS IVF,  Holding Dextrose since he is so hypokalemic due to  concern for stimulating insulin secretion with worsening hypokalemia .

## 2024-09-18 NOTE — ED NOTES
Nursing report ED to floor  Dixon Montoya  68 y.o.  male    HPI :  HPI (Adult)  Stated Reason for Visit: Pt to ED via EMS from home for reports of general malaise x few weeks. Pt denies any abdominal pain, but reports nausea. Pt lives alone and has had some weakness that caused a fall 3 weeks ago; since the pt has had difficulty getting around the house. EMS was called for lift assist due to patient slipping off the couch to reach for a trash can when he couldn't get himself back up on the couch. EMS found pt to be pale, hypotensive 90/50, and tachycardic 117. Pt is A/OX4 at the time of triage  History Obtained From: patient, EMS    Chief Complaint  Chief Complaint   Patient presents with    Weakness - Generalized       Admitting doctor:   Ricki Flood MD    Admitting diagnosis:   The primary encounter diagnosis was Sepsis with acute renal failure without septic shock, due to unspecified organism, unspecified acute renal failure type. Diagnoses of SHANA (acute kidney injury), Hypokalemia, Acute UTI, Macrocytic anemia, Generalized weakness, Fall, initial encounter, Elevated troponin, Pneumonia of right lower lobe due to infectious organism, and Closed fracture of olecranon process of left ulna, initial encounter were also pertinent to this visit.    Code status:   Current Code Status       Date Active Code Status Order ID Comments User Context       9/18/2024 0401 CPR (Attempt to Resuscitate) [id number]  Lilia Ramos, APRN ED        Question Answer    Code Status (Patient has no pulse and is not breathing) CPR (Attempt to Resuscitate)    Medical Interventions (Patient has pulse or is breathing) Full Support                    Allergies:   Patient has no known allergies.    Isolation:   No active isolations    Intake and Output    Intake/Output Summary (Last 24 hours) at 9/18/2024 1207  Last data filed at 9/18/2024 0912  Gross per 24 hour   Intake 2483.75 ml   Output 250 ml   Net 2233.75 ml       Weight:        09/18/24  0952   Weight: 78.5 kg (173 lb)       Most recent vitals:   Vitals:    09/18/24 1101 09/18/24 1114 09/18/24 1115 09/18/24 1130   BP: 113/59  108/67 105/57   Pulse: 93 90 92 92   Resp:  18     Temp:       TempSrc:       SpO2: 92% 93% 92% 93%   Weight:       Height:           Active LDAs/IV Access:   Lines, Drains & Airways       Active LDAs       Name Placement date Placement time Site Days    Peripheral IV Left Antecubital --  --  Antecubital  --    Peripheral IV 09/18/24 0140 Right Antecubital 09/18/24  0140  Antecubital  less than 1                    Labs (abnormal labs have a star):   Labs Reviewed   COMPREHENSIVE METABOLIC PANEL - Abnormal; Notable for the following components:       Result Value    Glucose 149 (*)      (*)     Creatinine 7.08 (*)     Potassium 2.3 (*)     Chloride 96 (*)     CO2 11.5 (*)     Calcium 7.3 (*)     Total Protein 5.8 (*)     Albumin 2.9 (*)     Anion Gap 30.5 (*)     eGFR 7.9 (*)     All other components within normal limits    Narrative:     GFR Normal >60  Chronic Kidney Disease <60  Kidney Failure <15     URINALYSIS W/ MICROSCOPIC IF INDICATED (NO CULTURE) - Abnormal; Notable for the following components:    Appearance, UA Cloudy (*)     Ketones, UA 15 mg/dL (1+) (*)     Blood, UA Large (3+) (*)     Protein,  mg/dL (2+) (*)     Leuk Esterase, UA Moderate (2+) (*)     All other components within normal limits   TROPONIN - Abnormal; Notable for the following components:    HS Troponin T 665 (*)     All other components within normal limits    Narrative:     High Sensitive Troponin T Reference Range:  <14.0 ng/L- Negative Female for AMI  <22.0 ng/L- Negative Male for AMI  >=14 - Abnormal Female indicating possible myocardial injury.  >=22 - Abnormal Male indicating possible myocardial injury.   Clinicians would have to utilize clinical acumen, EKG, Troponin, and serial changes to determine if it is an Acute Myocardial Infarction or myocardial injury due to  an underlying chronic condition.        BNP (IN-HOUSE) - Abnormal; Notable for the following components:    proBNP 17,683.0 (*)     All other components within normal limits    Narrative:     This assay is used as an aid in the diagnosis of individuals suspected of having heart failure. It can be used as an aid in the diagnosis of acute decompensated heart failure (ADHF) in patients presenting with signs and symptoms of ADHF to the emergency department (ED). In addition, NT-proBNP of <300 pg/mL indicates ADHF is not likely.    Age Range Result Interpretation  NT-proBNP Concentration (pg/mL:      <50             Positive            >450                   Gray                 300-450                    Negative             <300    50-75           Positive            >900                  Gray                300-900                  Negative            <300      >75             Positive            >1800                  Gray                300-1800                  Negative            <300   TSH - Abnormal; Notable for the following components:    TSH 0.091 (*)     All other components within normal limits   CBC WITH AUTO DIFFERENTIAL - Abnormal; Notable for the following components:    WBC 21.21 (*)     RBC 1.35 (*)     Hemoglobin 5.6 (*)     Hematocrit 15.6 (*)     .6 (*)     MCH 41.5 (*)     MCHC 35.9 (*)     RDW-SD 60.0 (*)     Platelets 137 (*)     All other components within normal limits   URINALYSIS, MICROSCOPIC ONLY - Abnormal; Notable for the following components:    WBC, UA Too Numerous to Count (*)     Bacteria, UA 4+ (*)     All other components within normal limits   MANUAL DIFFERENTIAL - Abnormal; Notable for the following components:    Neutrophil % 98.0 (*)     Lymphocyte % 1.0 (*)     Monocyte % 1.0 (*)     Neutrophils Absolute 20.79 (*)     Lymphocytes Absolute 0.21 (*)     All other components within normal limits   PROCALCITONIN - Abnormal; Notable for the following components:     "Procalcitonin 1.41 (*)     All other components within normal limits    Narrative:     As a Marker for Sepsis (Non-Neonates):    1. <0.5 ng/mL represents a low risk of severe sepsis and/or septic shock.  2. >2 ng/mL represents a high risk of severe sepsis and/or septic shock.    As a Marker for Lower Respiratory Tract Infections that require antibiotic therapy:    PCT on Admission    Antibiotic Therapy       6-12 Hrs later    >0.5                Strongly Recommended  >0.25 - <0.5        Recommended   0.1 - 0.25          Discouraged              Remeasure/reassess PCT  <0.1                Strongly Discouraged     Remeasure/reassess PCT    As 28 day mortality risk marker: \"Change in Procalcitonin Result\" (>80% or <=80%) if Day 0 (or Day 1) and Day 4 values are available. Refer to [email or web address]    Change in PCT <=80%  A decrease of PCT levels below or equal to 80% defines a positive change in PCT test result representing a higher risk for 28-day all-cause mortality of patients diagnosed with severe sepsis for septic shock.    Change in PCT >80%  A decrease of PCT levels of more than 80% defines a negative change in PCT result representing a lower risk for 28-day all-cause mortality of patients diagnosed with severe sepsis or septic shock.      HIGH SENSITIVITIY TROPONIN T 2HR - Abnormal; Notable for the following components:    HS Troponin T 600 (*)     Troponin T Delta -65 (*)     All other components within normal limits    Narrative:     High Sensitive Troponin T Reference Range:  <14.0 ng/L- Negative Female for AMI  <22.0 ng/L- Negative Male for AMI  >=14 - Abnormal Female indicating possible myocardial injury.  >=22 - Abnormal Male indicating possible myocardial injury.   Clinicians would have to utilize clinical acumen, EKG, Troponin, and serial changes to determine if it is an Acute Myocardial Infarction or myocardial injury due to an underlying chronic condition.        PHOSPHORUS - " Abnormal; Notable for the following components:    Phosphorus 9.5 (*)     All other components within normal limits   COMPREHENSIVE METABOLIC PANEL - Abnormal; Notable for the following components:     (*)     Creatinine 6.82 (*)     Potassium 2.6 (*)     CO2 11.0 (*)     Calcium 6.5 (*)     Total Protein 5.2 (*)     Albumin 2.6 (*)     Anion Gap 27.0 (*)     eGFR 8.2 (*)     All other components within normal limits    Narrative:     GFR Normal >60  Chronic Kidney Disease <60  Kidney Failure <15     CBC (NO DIFF) - Abnormal; Notable for the following components:    WBC 22.43 (*)     RBC 1.74 (*)     Hemoglobin 6.4 (*)     Hematocrit 19.1 (*)     .8 (*)     MCH 36.8 (*)     RDW 18.8 (*)     RDW-SD 75.3 (*)     Platelets 124 (*)     All other components within normal limits   IRON PROFILE - Abnormal; Notable for the following components:    Iron 50 (*)     Transferrin 96 (*)     TIBC 143 (*)     All other components within normal limits   FERRITIN - Abnormal; Notable for the following components:    Ferritin 1,835.00 (*)     All other components within normal limits    Narrative:     Results may be falsely decreased if patient taking Biotin.     T3, FREE - Abnormal; Notable for the following components:    T3, Free 0.97 (*)     All other components within normal limits    Narrative:     Results may be falsely increased if patient taking Biotin.     PHOSPHORUS - Abnormal; Notable for the following components:    Phosphorus 8.5 (*)     All other components within normal limits   CK - Abnormal; Notable for the following components:    Creatine Kinase 692 (*)     All other components within normal limits   STREP PNEUMO AG, URINE OR CSF - Normal   LEGIONELLA ANTIGEN, URINE - Normal   RESPIRATORY PANEL PCR W/ COVID-19 (SARS-COV-2), NP [facility] IN [facility], 2 HR TAT - Normal    Narrative:     In the setting of a positive respiratory panel with a viral infection PLUS a negative procalcitonin without other underlying  concern for bacterial infection, consider observing off antibiotics or discontinuation of antibiotics and continue supportive care. If the respiratory panel is positive for atypical bacterial infection (Bordetella pertussis, Chlamydophila pneumoniae, or Mycoplasma pneumoniae), consider antibiotic de-escalation to target atypical bacterial infection.   T4, FREE - Normal   MAGNESIUM - Normal   LACTIC ACID, PLASMA - Normal   FOLATE - Normal    Narrative:     Results may be falsely increased if patient taking Biotin.     MAGNESIUM - Normal   BLOOD CULTURE   BLOOD CULTURE   URINE CULTURE   VITAMIN B12   VITAMIN D,25-HYDROXY   BASIC METABOLIC PANEL   CBC (NO DIFF)   OCCULT BLOOD X 1, STOOL   SODIUM, URINE, RANDOM   CREATININE URINE RANDOM (KIDNEY FUNCTION) GFR COMPONENT   FOLATE RBC   HEPATITIS B SURFACE ANTIGEN   PREPARE RBC   TYPE AND SCREEN   CBC AND DIFFERENTIAL    Narrative:     The following orders were created for panel order CBC & Differential.  Procedure                               Abnormality         Status                     ---------                               -----------         ------                     CBC Auto Differential[[id number]]        Abnormal            Final result                 Please view results for these tests on the individual orders.       EKG:   ECG 12 Lead Other; weakness   Preliminary Result   HEART RATE=90  bpm   RR Sgojinum=048  ms   NV Ydbvjqjo=432  ms   P Horizontal Axis=-12  deg   P Front Axis=60  deg   QRSD Ghnldezq=712  ms   QT Akfwyiue=546  ms   LVuN=118  ms   QRS Axis=-51  deg   T Wave Axis=106  deg   - ABNORMAL ECG -   Sinus rhythm   Left bundle branch block   Baseline wander in lead(s) V2   Date and Time of Study:2024-09-18 00:37:53      Telemetry Scan   Final Result      Telemetry Scan   Final Result          Meds given in ED:   Medications   sodium chloride 0.9 % flush 10 mL (10 mL Intravenous Given 9/18/24 1011)   acetaminophen (TYLENOL) tablet 650 mg (has no  administration in time range)     Or   acetaminophen (TYLENOL) 160 MG/5ML oral solution 650 mg (has no administration in time range)     Or   acetaminophen (TYLENOL) suppository 650 mg (has no administration in time range)   sennosides-docusate (PERICOLACE) 8.6-50 MG per tablet 2 tablet (has no administration in time range)     And   polyethylene glycol (MIRALAX) packet 17 g (has no administration in time range)     And   bisacodyl (DULCOLAX) EC tablet 5 mg (has no administration in time range)     And   bisacodyl (DULCOLAX) suppository 10 mg (has no administration in time range)   ondansetron ODT (ZOFRAN-ODT) disintegrating tablet 4 mg (has no administration in time range)     Or   ondansetron (ZOFRAN) injection 4 mg (has no administration in time range)   aluminum-magnesium hydroxide-simethicone (MAALOX MAX) 400-400-40 MG/5ML suspension 15 mL (has no administration in time range)   sodium chloride 0.9 % infusion (150 mL/hr Intravenous Rate/Dose Change 9/18/24 1121)   cefTRIAXone (ROCEPHIN) 2,000 mg in sodium chloride 0.9 % 100 mL MBP (has no administration in time range)   thiamine (B-1) injection 200 mg (200 mg Intravenous Given 9/18/24 1009)     Followed by   thiamine (VITAMIN B-1) tablet 100 mg (has no administration in time range)   folic acid (FOLVITE) tablet 1 mg (1 mg Oral Given 9/18/24 1012)   LORazepam (ATIVAN) tablet 1 mg (has no administration in time range)     Or   LORazepam (ATIVAN) injection 1 mg (has no administration in time range)     Or   LORazepam (ATIVAN) tablet 2 mg (has no administration in time range)     Or   LORazepam (ATIVAN) injection 2 mg (has no administration in time range)     Or   LORazepam (ATIVAN) injection 2 mg (has no administration in time range)     Or   LORazepam (ATIVAN) injection 2 mg (has no administration in time range)   LORazepam (ATIVAN) tablet 1 mg (1 mg Oral Given 9/18/24 0838)     Followed by   LORazepam (ATIVAN) tablet 1 mg (has no administration in time range)      Followed by   LORazepam (ATIVAN) tablet 1 mg (has no administration in time range)     Followed by   LORazepam (ATIVAN) tablet 1 mg (has no administration in time range)   citalopram (CeleXA) tablet 20 mg (20 mg Oral Given 9/18/24 1012)   levothyroxine (SYNTHROID, LEVOTHROID) tablet 25 mcg (25 mcg Oral Given 9/18/24 1142)   ipratropium-albuterol (DUO-NEB) nebulizer solution 3 mL (3 mL Nebulization Given 9/18/24 1114)   albuterol (PROVENTIL) nebulizer solution 0.083% 2.5 mg/3mL (has no administration in time range)   metroNIDAZOLE (FLAGYL) IVPB 500 mg (500 mg Intravenous New Bag 9/18/24 1143)   sodium chloride 0.9 % infusion (0 mL/hr Intravenous Stopped 9/18/24 0436)   folic acid 1 mg in sodium chloride 0.9 % 50 mL IVPB (0 mg Intravenous Stopped 9/18/24 0313)   thiamine (B-1) injection 200 mg (200 mg Intravenous Given 9/18/24 0157)   cefTRIAXone (ROCEPHIN) 2,000 mg in sodium chloride 0.9 % 100 mL MBP (0 mg Intravenous Stopped 9/18/24 0235)   sodium chloride 0.9 % bolus 1,000 mL (0 mL Intravenous Stopped 9/18/24 0316)   potassium chloride 10 mEq in 100 mL IVPB (0 mEq Intravenous Stopped 9/18/24 0258)     And   potassium chloride 10 mEq in 100 mL IVPB (0 mEq Intravenous Stopped 9/18/24 0401)     And   potassium chloride 10 mEq in 100 mL IVPB (0 mEq Intravenous Stopped 9/18/24 0537)     And   potassium chloride 10 mEq in 100 mL IVPB (0 mEq Intravenous Stopped 9/18/24 0718)   magnesium sulfate 2g/50 mL (PREMIX) infusion (0 g Intravenous Stopped 9/18/24 0402)   potassium chloride (K-DUR,KLOR-CON) ER tablet 40 mEq (40 mEq Oral Given 9/18/24 0154)   potassium chloride (K-DUR,KLOR-CON) ER tablet 40 mEq (40 mEq Oral Given 9/18/24 1014)   perflutren (DEFINITY) 8.476 mg in Sodium Chloride (PF) 0.9 % 10 mL injection (3.5 mL Intravenous Given 9/18/24 0952)       Imaging results:  XR ELBOW 2 VIEW LEFT    Addendum Date: 9/18/2024    ADDENDUM: Patient: KATHI CASTELLANOS  Time Out: 02:25 Exam(s): XR LEFT ELBOW  Added by Chaparro Prince MD on  9 18 2024 2:25 AM (-04:00) THE MEASUREMENT OF DISPLACEMENT WAS UPDATED: EXAM:   XR Left Elbow Complete, 3 or More Views CLINICAL HISTORY:    Reason for exam: fall, elbow pain. TECHNIQUE:   Frontal, lateral and oblique views of the left elbow. COMPARISON:   No relevant prior studies available. FINDINGS:   Bones joints:  Displaced, mildly comminuted fracture of the olecranon, which is displaced by proximally 1.5 cm.  No dislocation.   Soft tissues:  Unremarkable. IMPRESSION:       Displaced, mildly comminuted fracture of the olecranon, which is displaced by proximally 1.5 cm.     Result Date: 9/18/2024  Electronically signed by Chaparro Prince MD on 09-18-24 at 0224    XR Chest 1 View    Result Date: 9/18/2024  Electronically signed by Chaparro Prince MD on 09-18-24 at 0159     Ambulatory status:   - bedrest    Social issues:   Social History     Socioeconomic History    Marital status:    Tobacco Use    Smoking status: Every Day     Current packs/day: 1.00     Types: Cigarettes   Substance and Sexual Activity    Alcohol use: Yes     Alcohol/week: 4.0 standard drinks of alcohol     Types: 4 Shots of liquor per week    Drug use: Yes     Types: Marijuana       Peripheral Neurovascular  Peripheral Neurovascular (Adult)  Peripheral Neurovascular WDL: WDL    Neuro Cognitive  Neuro Cognitive (Adult)  Cognitive/Neuro/Behavioral WDL: WDL  Pupils  Pupil PERRLA: yes    Learning  Learning Assessment (Adult)  Learning Readiness and Ability: no barriers identified  Education Provided  Person Taught: patient  Teaching Method: verbal instruction  Teaching Focus: diagnostic test, symptom/problem overview  Education Outcome Evaluation: verbalizes understanding    Respiratory  Respiratory WDL  Respiratory WDL: .WDL except, all (pt states he's been SOB x couple weeks)  Rhythm/Pattern, Respiratory: shortness of breath  Cough Frequency: frequent  Cough Type: productive, loose, congested  Breath Sounds  Breath Sounds: All Fields  All Lung  Fields Breath Sounds: Anterior:, Lateral:, diminished, coarse    Abdominal Pain       Pain Assessments  Pain (Adult)  (0-10) Pain Rating: Rest: 0    NIH Stroke Scale       Sammi Goldman RN  09/18/24 12:07 EDT

## 2024-09-18 NOTE — CONSULTS
[facility]    Reason: critical care management    HPI:  Mr. Montoya is a 67yo male with multiple medical problems brought to the ER today for 3 week history of gradual onset, progressively worsening generalized weakness and dizziness/lightheadedness.  Symptoms severe, worse with ambulation.  Has chronic shortness of breath as well.  Drinks daily, has not been eating well and having decreased urine output.  He has had some falls during that time.  Evaluation here has shown SHANA with hypokalemia, anemia, pneumonia with sepsis.  Transfer to the ICU has been requested.    Past Medical History:   Diagnosis Date    Anxiety     Depression     Hyperlipidemia     Hypertension      Social History     Socioeconomic History    Marital status:    Tobacco Use    Smoking status: Every Day     Current packs/day: 1.00     Types: Cigarettes   Substance and Sexual Activity    Alcohol use: Yes     Alcohol/week: 4.0 standard drinks of alcohol     Types: 4 Shots of liquor per week    Drug use: Yes     Types: Marijuana     History reviewed. No pertinent family history.  MEDS: Reviewed as per chart  ALL: ndka  ROS: 12 point negative except as in HPI    Vital Sign Min/Max for last 24 hours  Temp  Min: 96.8 °F (36 °C)  Max: 98.7 °F (37.1 °C)   BP  Min: 98/54  Max: 126/73   Pulse  Min: 89  Max: 99   Resp  Min: 16  Max: 22   SpO2  Min: 92 %  Max: 100 %   Flow (L/min)  Min: 2  Max: 3   Weight  Min: 78.5 kg (173 lb)  Max: 78.5 kg (173 lb)     GEN:   appears ill,  a bit dazed dose manage to give name, locatoin and month  HEENT: PERRL, EOMI, no icterus, mmdry  no JVD, trachea midline, neck supple  CHEST: shallow mildly coarse a bit shallow fast bilat, no wheezes, no crackles, no use of accessory muscles  CV: tachy, reagular, no m/g/r  ABD: soft, nt, nd +bs, no hepatosplenomegaly  EXT: no c/c/e -- a bit dusky in the le bilaterally  SKIN: no rashes, no xanthomas, nl turgor, warm, dry  LYMPH: no palpable cervical or  supraclavicular lymphadenopathy  NEURO: CN 2-12 intact and symmetric bilaterally  PSYCH: nl affect, nl orientation, nl judgment, nl mood  MSK: no kyphoscoliosis, 5/5 strength ue and le bilaterally    Labs: 9/18: reviewed:  7.37/17/60  Lactate 1.6  Glucose 95  Bun 127  Cr 6.7  Na 140  K 2.6  Bicarb 10.6  Wbc 22.7  Hgb 8.1  Plts 139  Bhydroxybutrate 2.4  Trop 600  Procal 1.4  Urine strep/leg neg  Rpp neg  9/18: displaced fracture of the olecranon  9/18: CXR: right lower lobe infiltrate    A/P:  SHANA _- multifactorial -- nephrology following -- continue volume replacement, avoid nephrotoxins,   Sepsis with pneumonia -- continue antibiotics, check infectious work up  Alcohol abuse -- ciwa, thiamine  CAD -- cards following, elevated trop in setting of shana  Anemia  Hypothyroidism  Possible copd with ongoing tobacco abuse -- smoking cessation, outpt pft  pVD --agree with check raine  AGMA - starvation ketosis  Fracture of arm -- currently in sling will ask ortho to see    Transfer to ICU    CC 36 mins.

## 2024-09-18 NOTE — ED PROVIDER NOTES
EMERGENCY DEPARTMENT ENCOUNTER  Room Number:  I382/1  Date of encounter:  9/20/2024  PCP: Jaquelin Rueda APRN  Patient Care Team:  Jaquelin Rueda APRN as PCP - General (Nurse Practitioner)     HPI:  Context: Dixon Montoya is a 68 y.o. male who presents to the ED c/o chief complaint of generalized weakness.  Patient reports he has been feeling progressively more weak for the last 3 weeks, reports extremely fatigued, has had some lightheadedness and dizziness.  Patient reports he did have a fall 3 weeks ago, obtained bruising as well as injured his left elbow at that time.  Patient endorses shortness of breath but reports that this is chronic for him as he is a longstanding smoker.  Patient denies any fevers, reports cough is chronic in nature unchanged, no vomiting or diarrhea.  Patient denies any urinary symptoms.  No chest pain.    MEDICAL HISTORY REVIEW  Reviewed in EPIC    PAST MEDICAL HISTORY  Active Ambulatory Problems     Diagnosis Date Noted    No Active Ambulatory Problems     Resolved Ambulatory Problems     Diagnosis Date Noted    No Resolved Ambulatory Problems     Past Medical History:   Diagnosis Date    Anxiety     Depression     Hyperlipidemia     Hypertension        PAST SURGICAL HISTORY  History reviewed. No pertinent surgical history.    FAMILY HISTORY  History reviewed. No pertinent family history.    SOCIAL HISTORY  Social History     Socioeconomic History    Marital status:    Tobacco Use    Smoking status: Every Day     Current packs/day: 1.00     Types: Cigarettes    Smokeless tobacco: Never   Vaping Use    Vaping status: Never Used   Substance and Sexual Activity    Alcohol use: Yes     Alcohol/week: 4.0 standard drinks of alcohol     Types: 4 Shots of liquor per week    Drug use: Yes     Types: Marijuana    Sexual activity: Defer       ALLERGIES  Patient has no known allergies.    The patient's allergies have been reviewed    REVIEW OF SYSTEMS  All systems reviewed and negative  except for those discussed in HPI.     PHYSICAL EXAM  I have reviewed the triage vital signs and nursing notes.  ED Triage Vitals [09/17/24 2341]   Temp Heart Rate Resp BP SpO2   96.8 °F (36 °C) 96 22 120/55 93 %      Temp src Heart Rate Source Patient Position BP Location FiO2 (%)   Tympanic Monitor Sitting Right arm --       General: No acute distress.  HENT: NCAT, PERRL, Nares patent.  Eyes: no scleral icterus.  Neck: trachea midline, no ROM limitations.  CV: regular rhythm, regular rate.  Respiratory: normal effort, CTAB.  Abdomen: soft, nondistended, NTTP, no rebound tenderness, no guarding or rigidity.  Musculoskeletal: no deformity. Tenderness and swelling at left elbow, neurovascular intact distally.  Neuro: alert, moves all extremities, follows commands.  Skin: warm, dry.  Pale.  Significant bruising on left arm.    LAB RESULTS  Recent Results (from the past 24 hour(s))   Respiratory Culture - Aspirate, ET Suction    Collection Time: 09/19/24  2:45 PM    Specimen: ET Suction; Aspirate   Result Value Ref Range    Respiratory Culture Scant growth (1+) Candida albicans (A)     Respiratory Culture No Normal Respiratory Soila (A)    POC Glucose Once    Collection Time: 09/19/24  5:36 PM    Specimen: Blood   Result Value Ref Range    Glucose 116 70 - 130 mg/dL   Renal Function Panel    Collection Time: 09/19/24  5:53 PM    Specimen: Blood   Result Value Ref Range    Glucose 97 65 - 99 mg/dL    BUN 89 (H) 8 - 23 mg/dL    Creatinine 3.68 (H) 0.76 - 1.27 mg/dL    Sodium 145 136 - 145 mmol/L    Potassium 2.8 (L) 3.5 - 5.2 mmol/L    Chloride 109 (H) 98 - 107 mmol/L    CO2 17.0 (L) 22.0 - 29.0 mmol/L    Calcium 7.3 (L) 8.6 - 10.5 mg/dL    Albumin 2.3 (L) 3.5 - 5.2 g/dL    Phosphorus 4.7 (H) 2.5 - 4.5 mg/dL    Anion Gap 19.0 (H) 5.0 - 15.0 mmol/L    BUN/Creatinine Ratio 24.2 7.0 - 25.0    eGFR 17.3 (L) >60.0 mL/min/1.73   Magnesium    Collection Time: 09/19/24  5:53 PM    Specimen: Blood   Result Value Ref Range     Magnesium 1.8 1.6 - 2.4 mg/dL   Calcium, Ionized    Collection Time: 09/19/24  5:53 PM    Specimen: Blood   Result Value Ref Range    Ionized Calcium 1.02 (L) 1.15 - 1.35 mmol/L   Calcium, Ionized    Collection Time: 09/19/24 11:56 PM    Specimen: Blood   Result Value Ref Range    Ionized Calcium 1.04 (L) 1.15 - 1.35 mmol/L   Magnesium    Collection Time: 09/19/24 11:56 PM    Specimen: Blood   Result Value Ref Range    Magnesium 2.0 1.6 - 2.4 mg/dL   Renal Function Panel    Collection Time: 09/19/24 11:56 PM    Specimen: Blood   Result Value Ref Range    Glucose 107 (H) 65 - 99 mg/dL    BUN 56 (H) 8 - 23 mg/dL    Creatinine 2.22 (H) 0.76 - 1.27 mg/dL    Sodium 140 136 - 145 mmol/L    Potassium 3.1 (L) 3.5 - 5.2 mmol/L    Chloride 108 (H) 98 - 107 mmol/L    CO2 19.0 (L) 22.0 - 29.0 mmol/L    Calcium 7.2 (L) 8.6 - 10.5 mg/dL    Albumin 1.9 (L) 3.5 - 5.2 g/dL    Phosphorus 3.3 2.5 - 4.5 mg/dL    Anion Gap 13.0 5.0 - 15.0 mmol/L    BUN/Creatinine Ratio 25.2 (H) 7.0 - 25.0    eGFR 31.7 (L) >60.0 mL/min/1.73   CBC Auto Differential    Collection Time: 09/20/24  4:56 AM    Specimen: Blood   Result Value Ref Range    WBC 14.68 (H) 3.40 - 10.80 10*3/mm3    RBC 1.88 (L) 4.14 - 5.80 10*6/mm3    Hemoglobin 6.6 (C) 13.0 - 17.7 g/dL    Hematocrit 19.7 (C) 37.5 - 51.0 %    .8 (H) 79.0 - 97.0 fL    MCH 35.1 (H) 26.6 - 33.0 pg    MCHC 33.5 31.5 - 35.7 g/dL    RDW 21.5 (H) 12.3 - 15.4 %    RDW-SD 81.5 (H) 37.0 - 54.0 fl    MPV 10.3 6.0 - 12.0 fL    Platelets 104 (L) 140 - 450 10*3/mm3   Manual Differential    Collection Time: 09/20/24  4:56 AM    Specimen: Blood   Result Value Ref Range    Neutrophil % 99.0 (H) 42.7 - 76.0 %    Lymphocyte % 1.0 (L) 19.6 - 45.3 %    Monocyte % 0.0 (L) 5.0 - 12.0 %    Eosinophil % 0.0 (L) 0.3 - 6.2 %    Basophil % 0.0 0.0 - 1.5 %    Neutrophils Absolute 14.53 (H) 1.70 - 7.00 10*3/mm3    Lymphocytes Absolute 0.15 (L) 0.70 - 3.10 10*3/mm3    Monocytes Absolute 0.00 (L) 0.10 - 0.90 10*3/mm3     Eosinophils Absolute 0.00 0.00 - 0.40 10*3/mm3    Basophils Absolute 0.00 0.00 - 0.20 10*3/mm3    Anisocytosis Slight/1+ None Seen    Hypochromia Slight/1+ None Seen    Macrocytes Slight/1+ None Seen    WBC Morphology Normal Normal    Platelet Morphology Normal Normal   Lactic Acid, Plasma    Collection Time: 09/20/24  4:57 AM    Specimen: Blood   Result Value Ref Range    Lactate 1.5 0.5 - 2.0 mmol/L   Comprehensive Metabolic Panel    Collection Time: 09/20/24  4:57 AM    Specimen: Blood   Result Value Ref Range    Glucose 114 (H) 65 - 99 mg/dL    BUN 37 (H) 8 - 23 mg/dL    Creatinine 1.63 (H) 0.76 - 1.27 mg/dL    Sodium 140 136 - 145 mmol/L    Potassium 3.0 (L) 3.5 - 5.2 mmol/L    Chloride 105 98 - 107 mmol/L    CO2 23.0 22.0 - 29.0 mmol/L    Calcium 7.3 (L) 8.6 - 10.5 mg/dL    Total Protein 4.5 (L) 6.0 - 8.5 g/dL    Albumin 2.0 (L) 3.5 - 5.2 g/dL    ALT (SGPT) 12 1 - 41 U/L    AST (SGOT) 36 1 - 40 U/L    Alkaline Phosphatase 95 39 - 117 U/L    Total Bilirubin 0.6 0.0 - 1.2 mg/dL    Globulin 2.5 gm/dL    A/G Ratio 0.8 g/dL    BUN/Creatinine Ratio 22.7 7.0 - 25.0    Anion Gap 12.0 5.0 - 15.0 mmol/L    eGFR 45.9 (L) >60.0 mL/min/1.73   Beta Hydroxybutyrate Quantitative    Collection Time: 09/20/24  4:57 AM    Specimen: Blood   Result Value Ref Range    Beta-Hydroxybutyrate Quant 2.340 (H) 0.020 - 0.270 mmol/L   Blood Gas, Arterial -    Collection Time: 09/20/24  5:35 AM    Specimen: Arterial Blood   Result Value Ref Range    Site Right Radial     Juan Carlos's Test Positive     pH, Arterial 7.600 (C) 7.350 - 7.450 pH units    pCO2, Arterial 25.1 (L) 35.0 - 45.0 mm Hg    pO2, Arterial 90.5 80.0 - 100.0 mm Hg    HCO3, Arterial 24.6 22.0 - 28.0 mmol/L    Base Excess, Arterial 2.6 (H) 0.0 - 2.0 mmol/L    O2 Saturation, Arterial 98.4 92.0 - 98.5 %    A-a DO2 0.2 mmHg    CO2 Content 25.4 23 - 27 mmol/L    Barometric Pressure for Blood Gas 749.1000 mmHg    Modality Adult Vent     FIO2 60 %    Ventilator Mode PC     Set Mech Resp  Rate 18     Rate 22 Breaths/minute    PEEP 5     PIP 24 cmH2O    Notified Who Domenico Lee     Read Back Yes     Notified Time      Hemodilution No     Inspiratory Time 1     Device Comment IP 18 ETCO2 17 Vte 669     PO2/FIO2 151 0 - 500   POC Glucose Once    Collection Time: 09/20/24  5:44 AM    Specimen: Blood   Result Value Ref Range    Glucose 99 70 - 130 mg/dL   Prepare RBC, 1 Units    Collection Time: 09/20/24  6:11 AM   Result Value Ref Range    Product Code G3319I16     Unit Number [id number]     UNIT  ABO O     UNIT  RH POS     Crossmatch Interpretation Compatible     Dispense Status IS     Blood Expiration Date [id number]     Blood Type Barcode 5100    Renal Function Panel    Collection Time: 09/20/24  7:50 AM    Specimen: Blood   Result Value Ref Range    Glucose 106 (H) 65 - 99 mg/dL    BUN 30 (H) 8 - 23 mg/dL    Creatinine 1.37 (H) 0.76 - 1.27 mg/dL    Sodium 139 136 - 145 mmol/L    Potassium 3.6 3.5 - 5.2 mmol/L    Chloride 104 98 - 107 mmol/L    CO2 22.9 22.0 - 29.0 mmol/L    Calcium 7.4 (L) 8.6 - 10.5 mg/dL    Albumin 1.9 (L) 3.5 - 5.2 g/dL    Phosphorus 2.8 2.5 - 4.5 mg/dL    Anion Gap 12.1 5.0 - 15.0 mmol/L    BUN/Creatinine Ratio 21.9 7.0 - 25.0    eGFR 56.5 (L) >60.0 mL/min/1.73   Magnesium    Collection Time: 09/20/24  7:50 AM    Specimen: Blood   Result Value Ref Range    Magnesium 2.0 1.6 - 2.4 mg/dL   Calcium, Ionized    Collection Time: 09/20/24  7:50 AM    Specimen: Blood   Result Value Ref Range    Ionized Calcium 1.06 (L) 1.15 - 1.35 mmol/L   Vancomycin, Trough    Collection Time: 09/20/24  9:54 AM    Specimen: Blood   Result Value Ref Range    Vancomycin Trough 9.10 5.00 - 20.00 mcg/mL   POC Glucose Once    Collection Time: 09/20/24 11:38 AM    Specimen: Blood   Result Value Ref Range    Glucose 71 70 - 130 mg/dL   POC Glucose Once    Collection Time: 09/20/24 11:40 AM    Specimen: Blood   Result Value Ref Range    Glucose 76 70 - 130 mg/dL       I ordered the above labs and  reviewed the results.    RADIOLOGY  No Radiology Exams Resulted Within Past 24 Hours    I ordered the above noted radiological studies. I reviewed the images and results. I agree with the radiologist interpretation.    PROCEDURES  Procedures    MEDICATIONS GIVEN IN ER  Medications   sodium chloride 0.9 % flush 10 mL (10 mL Intravenous Given 9/19/24 2042)   acetaminophen (TYLENOL) tablet 650 mg (has no administration in time range)     Or   acetaminophen (TYLENOL) 160 MG/5ML oral solution 650 mg (has no administration in time range)     Or   acetaminophen (TYLENOL) suppository 650 mg (has no administration in time range)   sennosides-docusate (PERICOLACE) 8.6-50 MG per tablet 2 tablet (has no administration in time range)     And   polyethylene glycol (MIRALAX) packet 17 g (has no administration in time range)     And   bisacodyl (DULCOLAX) EC tablet 5 mg (has no administration in time range)     And   bisacodyl (DULCOLAX) suppository 10 mg (has no administration in time range)   ondansetron ODT (ZOFRAN-ODT) disintegrating tablet 4 mg (has no administration in time range)     Or   ondansetron (ZOFRAN) injection 4 mg (has no administration in time range)   LORazepam (ATIVAN) tablet 1 mg ( Oral Not Given:  See Alt 9/19/24 0624)     Or   LORazepam (ATIVAN) injection 1 mg ( Intravenous Not Given:  See Alt 9/19/24 0624)     Or   LORazepam (ATIVAN) tablet 2 mg ( Oral Not Given:  See Alt 9/19/24 0624)     Or   LORazepam (ATIVAN) injection 2 mg ( Intravenous Not Given:  See Alt 9/19/24 0624)     Or   LORazepam (ATIVAN) injection 2 mg (2 mg Intravenous Given 9/19/24 0624)     Or   LORazepam (ATIVAN) injection 2 mg ( Intramuscular Not Given:  See Alt 9/19/24 0624)   ipratropium-albuterol (DUO-NEB) nebulizer solution 3 mL (3 mL Nebulization Given 9/20/24 1128)   albuterol (PROVENTIL) nebulizer solution 0.083% 2.5 mg/3mL (has no administration in time range)   metroNIDAZOLE (FLAGYL) IVPB 500 mg (500 mg Intravenous New Bag  9/20/24 1202)   potassium chloride 10 mEq in 100 mL IVPB (10 mEq Intravenous Not Given 9/18/24 2223)   propofol (DIPRIVAN) infusion 10 mg/mL 100 mL (50 mcg/kg/min × 75.2 kg Intravenous New Bag 9/20/24 0950)   fentaNYL citrate (PF) (SUBLIMAZE) injection 50 mcg (50 mcg Intravenous Given 9/19/24 0805)   LORazepam (ATIVAN) injection 1 mg (1 mg Intravenous Not Given 9/20/24 0207)     Followed by   LORazepam (ATIVAN) injection 1 mg (1 mg Intravenous Not Given 9/20/24 0905)     Followed by   LORazepam (ATIVAN) injection 1 mg (has no administration in time range)   dexmedetomidine (PRECEDEX) 400 mcg in 100 mL NS infusion (0 mcg/kg/hr × 75.2 kg Intravenous Stopped 9/19/24 2105)   phenylephrine (JEWELL-SYNEPHRINE) 50 mg in 250 mL NS infusion (1.5 mcg/kg/min × 75.2 kg Intravenous Rate/Dose Change 9/20/24 1005)   Phoxillum BK4/2.5 dialysis solution (1,500 mL/hr CRRT New Bag 9/20/24 1303)   Phoxillum BK4/2.5 dialysis solution (1,500 mL/hr CRRT New Bag 9/20/24 1303)   Magnesium Standard Dose Replacement - Follow Nurse / BPA Driven Protocol (has no administration in time range)   Phosphorus Replacement - Follow Nurse / BPA Driven Protocol (has no administration in time range)   Calcium Replacement - Follow Nurse / BPA Driven Protocol (has no administration in time range)   vitamin D (ERGOCALCIFEROL) capsule 50,000 Units (has no administration in time range)   levothyroxine (SYNTHROID, LEVOTHROID) tablet 25 mcg (25 mcg Nasogastric Given 9/20/24 0521)   folic acid (FOLVITE) tablet 1 mg (1 mg Nasogastric Given 9/20/24 0910)   citalopram (CeleXA) tablet 20 mg (has no administration in time range)   heparin (porcine) 5000 UNIT/ML injection 5,000 Units (5,000 Units Subcutaneous Given 9/20/24 0521)   chlorhexidine (PERIDEX) 0.12 % solution 15 mL (15 mL Mouth/Throat Given 9/20/24 0911)   pantoprazole (PROTONIX) injection 40 mg (40 mg Intravenous Given 9/20/24 0910)   heparin injection 300 Units (300 Units Intracatheter Given 9/19/24 1229)    cefepime 2000 mg IVPB in 100 mL NS (MBP) (2,000 mg Intravenous New Bag 9/20/24 0634)   vasopressin (PITRESSIN) 20 units in 100 mL NS infusion (has no administration in time range)   Phoxillum BK4/2.5 dialysis solution (1,500 mL/hr CRRT New Bag 9/20/24 1302)   heparin (porcine) injection 1,000-2,000 Units (has no administration in time range)   sodium chloride 0.9 % bolus 200 mL (has no administration in time range)   thiamine (B-1) injection 200 mg (200 mg Intravenous Given 9/20/24 0800)     Followed by   thiamine (VITAMIN B-1) tablet 100 mg (has no administration in time range)   aluminum-magnesium hydroxide-simethicone (MAALOX MAX) 400-400-40 MG/5ML suspension 15 mL (has no administration in time range)   sodium chloride 0.9 % infusion (0 mL/hr Intravenous Stopped 9/18/24 0436)   folic acid 1 mg in sodium chloride 0.9 % 50 mL IVPB (0 mg Intravenous Stopped 9/18/24 0313)   thiamine (B-1) injection 200 mg (200 mg Intravenous Given 9/18/24 0157)   cefTRIAXone (ROCEPHIN) 2,000 mg in sodium chloride 0.9 % 100 mL MBP (0 mg Intravenous Stopped 9/18/24 0235)   sodium chloride 0.9 % bolus 1,000 mL (0 mL Intravenous Stopped 9/18/24 0316)   potassium chloride 10 mEq in 100 mL IVPB (0 mEq Intravenous Stopped 9/18/24 0258)     And   potassium chloride 10 mEq in 100 mL IVPB (0 mEq Intravenous Stopped 9/18/24 0401)     And   potassium chloride 10 mEq in 100 mL IVPB (0 mEq Intravenous Stopped 9/18/24 0537)     And   potassium chloride 10 mEq in 100 mL IVPB (0 mEq Intravenous Stopped 9/18/24 0718)   magnesium sulfate 2g/50 mL (PREMIX) infusion (0 g Intravenous Stopped 9/18/24 0402)   potassium chloride (K-DUR,KLOR-CON) ER tablet 40 mEq (40 mEq Oral Given 9/18/24 0154)   potassium chloride (K-DUR,KLOR-CON) ER tablet 40 mEq (40 mEq Oral Given 9/18/24 1014)   perflutren (DEFINITY) 8.476 mg in Sodium Chloride (PF) 0.9 % 10 mL injection (3.5 mL Intravenous Given 9/18/24 4052)   potassium chloride 10 mEq in 100 mL IVPB (0 mEq  Intravenous Stopped 9/19/24 0846)   sodium bicarbonate injection 8.4% 100 mEq (100 mEq Intravenous Given 9/19/24 0854)   vancomycin IVPB 1500 mg in 0.9% NaCl (Premix) 500 mL (1,500 mg Intravenous New Bag 9/19/24 1151)   sodium bicarbonate injection 8.4% 100 mEq (100 mEq Intravenous Given 9/19/24 1412)   potassium chloride (KLOR-CON) packet 40 mEq (40 mEq Nasogastric Given 9/19/24 2041)   potassium chloride 10 mEq in 100 mL IVPB (10 mEq Intravenous New Bag 9/20/24 0057)   potassium chloride (KLOR-CON) packet 40 mEq (40 mEq Oral Given 9/20/24 0638)       PROGRESS, DATA ANALYSIS, CONSULTS, AND MEDICAL DECISION MAKING  A complete history and physical exam have been performed.  All available laboratory and imaging results have been reviewed by myself prior to disposition.    MDM    After the initial H&P, I discussed pertinent information from history and physical exam with patient/family.  Discussed differential diagnosis.  Discussed plan for ED evaluation/workup/treatment.  All questions answered.  Patient/family is agreeable with plan.  ED Course as of 09/20/24 1357   Tue Sep 17, 2024   4728 My differential diagnosis for generalized weakness includes but is not limited to:  Neuromuscular weakness   CVA  Hemorrhagic stroke  Multiple sclerosis  Amyotrophic Lateral Sclerosis (ALS) (UMN & LMN)  Spinal and bulbar muscular atrophy (Elroy's syndrome)  Spinal cord disease: Infection (Epidural abscess)  Infarction/ischemia  Trauma (Spinal Cord Syndromes)  Inflammation (Transverse Myelitis)  Degenerative (Spinal muscular atrophy)  Tumor  Peripheral nerve disease: Guillain-[place] syndrome  Toxins ([place])  Tick paralysis  Diabetic peripheral neuropathy  NMJ disease: Myasthenia gravis crisis  Botulism  Organophosphate toxicity  Lambert-Eaton myasthenic syndrome  Rhabdomyolysis  Dermatomyositis  Polymyositis  Alcoholic myopathy  Non-neuromuscular weakness   ACS  Arrhythmia/Syncope  Severe  infection/Sepsis  Hypoglycemia  Periodic paralysis (electrolyte disturbance, K, Mg, Ca)   Hypokalemic periodic paralysis  Thyrotoxic periodic paralysis  Respiratory failure  Symptomatic Anemia  Severe dehydration  Hypothyroidism  Polypharmacy  Malignancy           [JG]   Wed Sep 18, 2024   0054 EKG independently viewed and contemporaneously interpreted by ED physician. Time: 12:37 AM.  Rate 90.  Interpretation: Normal sinus rhythm, left axis deviation, left bundle branch block, appropriate discordant ST changes, Sgarbossa criteria negative. [JG]   0054 Hemoglobin 5.6, type and cross and 2 units. [JG]   0103 Hemoglobin 5.6, patient reassessed, denies any recent blood loss although does report that he has significant bruising on his left arm from the fall.  Patient denies any hematemesis, no coffee-ground emesis, no hematuria, no blood in his stool, no dark tarry stool.  Patient denies any history of blood transfusions in the past, denies any history of anemia. [JG]   0104 Patient with significant leukocytosis, obtain infectious workup, urinalysis and chest x-ray previously ordered, adding blood cultures and lactic acid, adding procalcitonin. [JG]   0107 Bacteria, UA(!): 4+  Will order abx   [CC]   0124 Patient has severe renal dysfunction with creatinine of 7.08, BUN significantly elevated at 129.  Patient receiving IV fluids with thiamine folic acid, additionally of IV fluids ordered. [JG]   0126 High-sensitivity troponin significantly elevated 665, EKG shows chronic left bundle branch block, Sgarbossa negative.  Symptoms consistent with symptomatic anemia, patient has no symptoms at rest, troponin elevation felt to be secondary to strain from severe anemia and severe kidney dysfunction, ACS not suspected.  Obtain repeat troponin to evaluate trend. [JG]   0127 Patient was turned over to me by Dr. Danielle.  Pending: work up and admission   [CC]   0127 Urinalysis consistent with urinary tract infection, treating with  ceftriaxone, sending urine for culture. [JG]   0212 Spoke with JEREMI Dixon with [facility].  Reviewed history, exam, results, treatments.  She agrees admit the patient to Dr. Bravo.  Blood culture and urine culture pending at the time of admission    [CC]   0245 I spoke with Dr. Clrake with nephrology.  Reviewed the patient's presentation and labs.  He would like to add on a phosphorus and would like to postvoid residual to make sure there is not an obstructing component to the patient's UTI and SHANA.  They will see in consult in the morning. [CC]      ED Course User Index  [CC] Estella Castillo PA-C  [JG] Mitch Danielle MD       AS OF 13:57 EDT VITALS:    BP - 106/60  HR - 88  TEMP - 99 °F (37.2 °C)  O2 SATS - 93%    DIAGNOSIS  Final diagnoses:   SHANA (acute kidney injury)   Hypokalemia   Acute UTI   Macrocytic anemia   Generalized weakness   Fall, initial encounter   Elevated troponin   Sepsis with acute renal failure without septic shock, due to unspecified organism, unspecified acute renal failure type   Pneumonia of right lower lobe due to infectious organism   Closed fracture of olecranon process of left ulna, initial encounter         DISPOSITION  ADMISSION    Discussed treatment plan and reason for admission with pt/family and admitting physician.  Pt/family voiced understanding of the plan for admission for further testing/treatment as needed.        Mitch Danielle MD  09/20/24 3704

## 2024-09-18 NOTE — ED PROVIDER NOTES
EMERGENCY DEPARTMENT ENCOUNTER    Room number:  15/15  Date Seen:  9/18/2024  PCP:  Jaquelin Rueda APRN    Laboratory Results:  Recent Results (from the past 24 hour(s))   Comprehensive Metabolic Panel    Collection Time: 09/18/24 12:23 AM    Specimen: Blood   Result Value Ref Range    Glucose 149 (H) 65 - 99 mg/dL     (H) 8 - 23 mg/dL    Creatinine 7.08 (H) 0.76 - 1.27 mg/dL    Sodium 138 136 - 145 mmol/L    Potassium 2.3 (C) 3.5 - 5.2 mmol/L    Chloride 96 (L) 98 - 107 mmol/L    CO2 11.5 (L) 22.0 - 29.0 mmol/L    Calcium 7.3 (L) 8.6 - 10.5 mg/dL    Total Protein 5.8 (L) 6.0 - 8.5 g/dL    Albumin 2.9 (L) 3.5 - 5.2 g/dL    ALT (SGPT) 19 1 - 41 U/L    AST (SGOT) 31 1 - 40 U/L    Alkaline Phosphatase 105 39 - 117 U/L    Total Bilirubin 0.7 0.0 - 1.2 mg/dL    Globulin 2.9 gm/dL    A/G Ratio 1.0 g/dL    BUN/Creatinine Ratio 18.2 7.0 - 25.0    Anion Gap 30.5 (H) 5.0 - 15.0 mmol/L    eGFR 7.9 (L) >60.0 mL/min/1.73   High Sensitivity Troponin T    Collection Time: 09/18/24 12:23 AM    Specimen: Blood   Result Value Ref Range    HS Troponin T 665 (C) <22 ng/L   BNP    Collection Time: 09/18/24 12:23 AM    Specimen: Blood   Result Value Ref Range    proBNP 17,683.0 (H) 0.0 - 900.0 pg/mL   T4, Free    Collection Time: 09/18/24 12:23 AM    Specimen: Blood   Result Value Ref Range    Free T4 0.98 0.92 - 1.68 ng/dL   TSH    Collection Time: 09/18/24 12:23 AM    Specimen: Blood   Result Value Ref Range    TSH 0.091 (L) 0.270 - 4.200 uIU/mL   Magnesium    Collection Time: 09/18/24 12:23 AM    Specimen: Blood   Result Value Ref Range    Magnesium 1.6 1.6 - 2.4 mg/dL   CBC Auto Differential    Collection Time: 09/18/24 12:23 AM    Specimen: Blood   Result Value Ref Range    WBC 21.21 (H) 3.40 - 10.80 10*3/mm3    RBC 1.35 (L) 4.14 - 5.80 10*6/mm3    Hemoglobin 5.6 (C) 13.0 - 17.7 g/dL    Hematocrit 15.6 (C) 37.5 - 51.0 %    .6 (H) 79.0 - 97.0 fL    MCH 41.5 (H) 26.6 - 33.0 pg    MCHC 35.9 (H) 31.5 - 35.7 g/dL    RDW  14.6 12.3 - 15.4 %    RDW-SD 60.0 (H) 37.0 - 54.0 fl    MPV 10.4 6.0 - 12.0 fL    Platelets 137 (L) 140 - 450 10*3/mm3   Manual Differential    Collection Time: 09/18/24 12:23 AM    Specimen: Blood   Result Value Ref Range    Neutrophil % 98.0 (H) 42.7 - 76.0 %    Lymphocyte % 1.0 (L) 19.6 - 45.3 %    Monocyte % 1.0 (L) 5.0 - 12.0 %    Neutrophils Absolute 20.79 (H) 1.70 - 7.00 10*3/mm3    Lymphocytes Absolute 0.21 (L) 0.70 - 3.10 10*3/mm3    Monocytes Absolute 0.21 0.10 - 0.90 10*3/mm3    RBC Morphology Normal Normal    WBC Morphology Normal Normal    Platelet Morphology Normal Normal   Procalcitonin    Collection Time: 09/18/24 12:23 AM    Specimen: Blood   Result Value Ref Range    Procalcitonin 1.41 (H) 0.00 - 0.25 ng/mL   Urinalysis With Microscopic If Indicated (No Culture) - Urine, Catheter    Collection Time: 09/18/24 12:27 AM    Specimen: Urine, Catheter   Result Value Ref Range    Color, UA Yellow Yellow, Straw    Appearance, UA Cloudy (A) Clear    pH, UA 5.5 5.0 - 8.0    Specific Gravity, UA 1.014 1.005 - 1.030    Glucose, UA Negative Negative    Ketones, UA 15 mg/dL (1+) (A) Negative    Bilirubin, UA Negative Negative    Blood, UA Large (3+) (A) Negative    Protein,  mg/dL (2+) (A) Negative    Leuk Esterase, UA Moderate (2+) (A) Negative    Nitrite, UA Negative Negative    Urobilinogen, UA 1.0 E.U./dL 0.2 - 1.0 E.U./dL   Urinalysis, Microscopic Only - Urine, Catheter    Collection Time: 09/18/24 12:27 AM    Specimen: Urine, Catheter   Result Value Ref Range    RBC, UA 0-2 None Seen, 0-2 /HPF    WBC, UA Too Numerous to Count (A) None Seen, 0-2 /HPF    Bacteria, UA 4+ (A) None Seen /HPF    Squamous Epithelial Cells, UA 0-2 None Seen, 0-2 /HPF    Hyaline Casts, UA 3-6 None Seen /LPF    Methodology Automated Microscopy    ECG 12 Lead Other; weakness    Collection Time: 09/18/24 12:37 AM   Result Value Ref Range    QT Interval 428 ms    QTC Interval 524 ms   Type & Screen    Collection Time: 09/18/24   1:33 AM    Specimen: Blood   Result Value Ref Range    ABO Type O     RH type Positive     Antibody Screen Negative     T&S Expiration Date 9/21/2024 11:59:59 PM    Lactic Acid, Plasma    Collection Time: 09/18/24  1:45 AM    Specimen: Blood   Result Value Ref Range    Lactate 1.8 0.5 - 2.0 mmol/L   Prepare RBC, 2 Units    Collection Time: 09/18/24  2:34 AM   Result Value Ref Range    Product Code V4630U61     Unit Number [id number]     UNIT  ABO O     UNIT  RH POS     Crossmatch Interpretation Compatible     Dispense Status XM     Blood Expiration Date [id number]     Blood Type Barcode 5100     Product Code K2826S28     Unit Number [id number]     UNIT  ABO O     UNIT  RH POS     Crossmatch Interpretation Compatible     Dispense Status XM     Blood Expiration Date [id number]     Blood Type Barcode 5100      I reviewed the above results.    Radiology:  XR ELBOW 2 VIEW LEFT    Addendum Date: 9/18/2024    ADDENDUM: Patient: KATHI CASTELLANOS  Time Out: 02:25 Exam(s): XR LEFT ELBOW  Added by Chaparro Prince MD on 9 18 2024 2:25 AM (-04:00) THE MEASUREMENT OF DISPLACEMENT WAS UPDATED: EXAM:   XR Left Elbow Complete, 3 or More Views CLINICAL HISTORY:    Reason for exam: fall, elbow pain. TECHNIQUE:   Frontal, lateral and oblique views of the left elbow. COMPARISON:   No relevant prior studies available. FINDINGS:   Bones joints:  Displaced, mildly comminuted fracture of the olecranon, which is displaced by proximally 1.5 cm.  No dislocation.   Soft tissues:  Unremarkable. IMPRESSION:       Displaced, mildly comminuted fracture of the olecranon, which is displaced by proximally 1.5 cm.     Result Date: 9/18/2024  Patient: KATHI CASTELLANOS  Time Out: 02:24 Exam(s): XR LEFT ELBOW EXAM:   XR Left Elbow Complete, 3 or More Views CLINICAL HISTORY:    Reason for exam: fall, elbow pain. TECHNIQUE:   Frontal, lateral and oblique views of the left elbow. COMPARISON:   No relevant prior studies available. FINDINGS:   Bones joints:   Displaced, mildly comminuted fracture of the olecranon, which is displaced by proximally 5 mm.  No dislocation.   Soft tissues:  Unremarkable. IMPRESSION:       Displaced, mildly comminuted fracture of the olecranon, which is displaced by proximally 5 mm.     Electronically signed by Chaparro Prince MD on 09-18-24 at 0224    XR Chest 1 View    Result Date: 9/18/2024  Patient: KATHI CASTELLANOS  Time Out: 01:59 Exam(s): XR CXR 1 VIEW EXAM:   XR Chest, 1 View CLINICAL HISTORY:    Reason for exam: gen weakness. TECHNIQUE:   Frontal view of the chest. COMPARISON:   No relevant prior studies available. FINDINGS:   Lungs:  Consolidation in the medial aspect of the RIGHT lower lung, concerning for pneumonia.   Pleural space:  Unremarkable.  No pneumothorax.   Heart:  Unremarkable.  No cardiomegaly.   Mediastinum:  Unremarkable.  Normal mediastinal contour.   Bones joints:  No acute fracture.  Old left-sided rib fractures IMPRESSION:       Consolidation in the medial aspect of the RIGHT lower lung, concerning for pneumonia.     Electronically signed by Chaparro Prince MD on 09-18-24 at 0159       I reviewed the above results    Medications ordered in ED:  Medications   sodium chloride 0.9 % infusion (has no administration in time range)   folic acid 1 mg in sodium chloride 0.9 % 50 mL IVPB (1 mg Intravenous New Bag 9/18/24 0241)   potassium chloride 10 mEq in 100 mL IVPB (10 mEq Intravenous New Bag 9/18/24 0155)     And   potassium chloride 10 mEq in 100 mL IVPB (has no administration in time range)     And   potassium chloride 10 mEq in 100 mL IVPB (has no administration in time range)     And   potassium chloride 10 mEq in 100 mL IVPB (has no administration in time range)   thiamine (B-1) injection 200 mg (200 mg Intravenous Given 9/18/24 0157)   cefTRIAXone (ROCEPHIN) 2,000 mg in sodium chloride 0.9 % 100 mL MBP (0 mg Intravenous Stopped 9/18/24 0235)   sodium chloride 0.9 % bolus 1,000 mL (1,000 mL Intravenous New Bag 9/18/24 0141)    magnesium sulfate 2g/50 mL (PREMIX) infusion (2 g Intravenous New Bag 9/18/24 0151)   potassium chloride (K-DUR,KLOR-CON) ER tablet 40 mEq (40 mEq Oral Given 9/18/24 0154)       ED Course as of 09/18/24 0252   Tue Sep 17, 2024   2350 My differential diagnosis for generalized weakness includes but is not limited to:  Neuromuscular weakness   CVA  Hemorrhagic stroke  Multiple sclerosis  Amyotrophic Lateral Sclerosis (ALS) (UMN & LMN)  Spinal and bulbar muscular atrophy (Elroy's syndrome)  Spinal cord disease: Infection (Epidural abscess)  Infarction/ischemia  Trauma (Spinal Cord Syndromes)  Inflammation (Transverse Myelitis)  Degenerative (Spinal muscular atrophy)  Tumor  Peripheral nerve disease: Guillain-[place] syndrome  Toxins ([place])  Tick paralysis  Diabetic peripheral neuropathy  NMJ disease: Myasthenia gravis crisis  Botulism  Organophosphate toxicity  Lambert-Eaton myasthenic syndrome  Rhabdomyolysis  Dermatomyositis  Polymyositis  Alcoholic myopathy  Non-neuromuscular weakness   ACS  Arrhythmia/Syncope  Severe infection/Sepsis  Hypoglycemia  Periodic paralysis (electrolyte disturbance, K, Mg, Ca)   Hypokalemic periodic paralysis  Thyrotoxic periodic paralysis  Respiratory failure  Symptomatic Anemia  Severe dehydration  Hypothyroidism  Polypharmacy  Malignancy           [JG]   Wed Sep 18, 2024   0054 EKG independently viewed and contemporaneously interpreted by ED physician. Time: 12:37 AM.  Rate 90.  Interpretation: Normal sinus rhythm, left axis deviation, left bundle branch block, appropriate discordant ST changes, Sgarbossa criteria negative. [JG]   0054 Hemoglobin 5.6, type and cross and 2 units. [JG]   0103 Hemoglobin 5.6, patient reassessed, denies any recent blood loss although does report that he has significant bruising on his left arm from the fall.  Patient denies any hematemesis, no coffee-ground emesis, no hematuria, no blood in his stool, no dark tarry stool.  Patient denies any history  of blood transfusions in the past, denies any history of anemia. [JG]   0104 Patient with significant leukocytosis, obtain infectious workup, urinalysis and chest x-ray previously ordered, adding blood cultures and lactic acid, adding procalcitonin. [JG]   0107 Bacteria, UA(!): 4+  Will order abx   [CC]   0124 Patient has severe renal dysfunction with creatinine of 7.08, BUN significantly elevated at 129.  Patient receiving IV fluids with thiamine folic acid, additionally of IV fluids ordered. [JG]   0126 High-sensitivity troponin significantly elevated 665, EKG shows chronic left bundle branch block, Sgarbossa negative.  Symptoms consistent with symptomatic anemia, patient has no symptoms at rest, troponin elevation felt to be secondary to strain from severe anemia and severe kidney dysfunction, ACS not suspected.  Obtain repeat troponin to evaluate trend. [JG]   0127 Patient was turned over to me by Dr. Danielle.  Pending: work up and admission   [CC]   0127 Urinalysis consistent with urinary tract infection, treating with ceftriaxone, sending urine for culture. [JG]   0212 Spoke with JEREMI Dixon with [facility].  Reviewed history, exam, results, treatments.  She agrees admit the patient to Dr. Bravo.  Blood culture and urine culture pending at the time of admission    [CC]   0245 I spoke with Dr. Clarke with nephrology.  Reviewed the patient's presentation and labs.  He would like to add on a phosphorus and would like to postvoid residual to make sure there is not an obstructing component to the patient's UTI and SHANA.  They will see in consult in the morning. [CC]      ED Course User Index  [CC] Estella Castillo PA-C  [JG] Mitch Danielle MD     Critical care provider statement:    Critical care time (minutes):  35 .   Critical care time was exclusive of:  Separately billable procedures and treating other patients   Critical care was necessary to treat or prevent imminent or life-threatening deterioration of the  following conditions:  Sepsis and Renal Failure   Critical care was time spent personally by me on the following activities:  Development of treatment plan with patient or surrogate, discussions with consultants, evaluation of patient's response to treatment, examination of patient, obtaining history from patient or surrogate, ordering and performing treatments and interventions, ordering and review of laboratory studies, ordering and review of radiographic studies, pulse oximetry, re-evaluation of patient's condition and review of old charts. Critical Care indicators: Acidosis, Blood loss, PRBCs hung, GI Bleed, Dehydration with significant metabolic / chemistry changes, and Severe bleeding requiring transfusion aggressive management: PRBCs      Diagnosis:  Final diagnoses:   SHANA (acute kidney injury)   Hypokalemia   Acute UTI   Macrocytic anemia   Generalized weakness   Fall, initial encounter   Elevated troponin   Sepsis with acute renal failure without septic shock, due to unspecified organism, unspecified acute renal failure type   Pneumonia of right lower lobe due to infectious organism   Closed fracture of olecranon process of left ulna, initial encounter         Provider attestation:  I personally reviewed the past medical history, past surgical history, social history, family history, current medications, and allergies as they appear in the chart.    The patient was seen and examined by myself and Dr. Danielle, who agree with plan.      Estella Castillo PA-C  09/18/24 0254

## 2024-09-18 NOTE — H&P
Patient Name:  Dixon Montoya  YOB: 1955  MRN:  [id number]  Admit Date:  9/17/2024  Patient Care Team:  Jaquelin Rueda APRN as PCP - General (Nurse Practitioner)      Subjective   History Present Illness     Chief Complaint   Patient presents with    Weakness - Generalized         History of Present Illness  Patient is a 68-year-old male history of CAD status post multiple stents, alcohol abuse, hypertension, hyperlipidemia, depression, COPD, stable hepatic steatosis, anxiety, presents to the ED complaining of generalized weakness, lightheadedness/dizziness.  Reports symptoms started approximately 3 weeks ago, progressively worsened.  Feels extremely fatigued, difficulty ambulating.  Does have dizziness and lightheadedness.  Feels shortness of breath reports it is chronic, history of COPD?  Daily tobacco user, smokes 1 pack/day.  History of chronic alcohol use, reports has been drinking daily for years.  Denies any history of withdrawal seizures.  Drinks rum 1.5 L every 3 days.  Patient reports decreased p.o. food and water intake, primarily due to not having appetite.  Noted decreased urine output.    Review of Systems   GENERAL:+ Fatigue, generalized weakness  RESPIRATORY: No cough, +shortness of breath, hemoptysis or wheezing.   CVS: No chest pain, palpitations, orthopnea, dyspnea on exertion   GI: No melena or hematochezia. No abdominal pain. No nausea, vomiting, constipation, diarrhea    Personal History     Past Medical History:   Diagnosis Date    Anxiety     Depression     Hyperlipidemia     Hypertension      History reviewed. No pertinent surgical history.  History reviewed. No pertinent family history.  Social History     Tobacco Use    Smoking status: Every Day     Current packs/day: 1.00     Types: Cigarettes   Substance Use Topics    Alcohol use: Yes     Alcohol/week: 4.0 standard drinks of alcohol     Types: 4 Shots of liquor per week    Drug use: Yes     Types: Marijuana     No  current facility-administered medications on file prior to encounter.     Current Outpatient Medications on File Prior to Encounter   Medication Sig Dispense Refill    citalopram (CeleXA) 40 MG tablet Take 1 tablet by mouth Daily.      folic acid (FOLVITE) 1 MG tablet Take 1 tablet by mouth Daily.      lisinopril (PRINIVIL,ZESTRIL) 30 MG tablet Take 1 tablet by mouth Daily.      loratadine (CLARITIN) 10 MG tablet Take 1 tablet by mouth Daily.      metoprolol succinate XL (TOPROL-XL) 25 MG 24 hr tablet Take 1 tablet by mouth Daily.      rosuvastatin (CRESTOR) 40 MG tablet Take 1 tablet by mouth Daily.      aspirin 81 MG EC tablet Take 1 tablet by mouth Daily.       No Known Allergies    Objective    Objective     Vital Signs  Temp:  [96.8 °F (36 °C)-98.7 °F (37.1 °C)] 96.9 °F (36.1 °C)  Heart Rate:  [89-96] 93  Resp:  [16-22] 19  BP: ()/(51-74) 101/51  SpO2:  [92 %-100 %] 92 %  on  Flow (L/min):  [2] 2;   Device (Oxygen Therapy): nasal cannula  Body mass index is 26.3 kg/m².    Physical Exam  General: Laying in bed, not in distress, ill-appearing,.  HEENT: Normocephalic, atraumatic  Eyes: PERRL, EOMI, anicteric sclerae  Lungs: Diminished, mild expiratory wheezing, nonlabored breathing  CV: Regular rate and rhythm, no murmurs rubs or gallops  Abdomen: Soft, nontender, nondistended.  Normoactive bowel sounds  Extremities: No significant peripheral edema  Skin: Bruising in  bilateral upper extremities,  Neuro: Cranial nerves II through XII intact, no gross focal neurological deficits appreciated, generalized weakness  Psych: Appropriate mood, flat affect    Results Review:  I reviewed the patient's new clinical results.  I reviewed the patient's new imaging results and agree with the interpretation.  I reviewed the patient's other test results and agree with the interpretation  I personally viewed and interpreted the patient's EKG/Telemetry data  Discussed with ED provider.    Lab Results (last 24 hours)        Procedure Component Value Units Date/Time    CBC & Differential [[id number]]  (Abnormal) Collected: 09/18/24 0023    Specimen: Blood Updated: 09/18/24 0041    Narrative:      The following orders were created for panel order CBC & Differential.  Procedure                               Abnormality         Status                     ---------                               -----------         ------                     CBC Auto Differential[[id number]]        Abnormal            Final result                 Please view results for these tests on the individual orders.    Comprehensive Metabolic Panel [[id number]]  (Abnormal) Collected: 09/18/24 0023    Specimen: Blood Updated: 09/18/24 0121     Glucose 149 mg/dL       mg/dL      Creatinine 7.08 mg/dL      Sodium 138 mmol/L      Potassium 2.3 mmol/L      Chloride 96 mmol/L      CO2 11.5 mmol/L      Calcium 7.3 mg/dL      Total Protein 5.8 g/dL      Albumin 2.9 g/dL      ALT (SGPT) 19 U/L      AST (SGOT) 31 U/L      Alkaline Phosphatase 105 U/L      Total Bilirubin 0.7 mg/dL      Globulin 2.9 gm/dL      A/G Ratio 1.0 g/dL      BUN/Creatinine Ratio 18.2     Anion Gap 30.5 mmol/L      eGFR 7.9 mL/min/1.73      Comment: <15 Indicative of kidney failure       Narrative:      GFR Normal >60  Chronic Kidney Disease <60  Kidney Failure <15      High Sensitivity Troponin T [[id number]]  (Abnormal) Collected: 09/18/24 0023    Specimen: Blood Updated: 09/18/24 0120     HS Troponin T 665 ng/L     Narrative:      High Sensitive Troponin T Reference Range:  <14.0 ng/L- Negative Female for AMI  <22.0 ng/L- Negative Male for AMI  >=14 - Abnormal Female indicating possible myocardial injury.  >=22 - Abnormal Male indicating possible myocardial injury.   Clinicians would have to utilize clinical acumen, EKG, Troponin, and serial changes to determine if it is an Acute Myocardial Infarction or myocardial injury due to an underlying chronic condition.         BNP [[id number]]  (Abnormal)  Collected: 09/18/24 0023    Specimen: Blood Updated: 09/18/24 0116     proBNP 17,683.0 pg/mL     Narrative:      This assay is used as an aid in the diagnosis of individuals suspected of having heart failure. It can be used as an aid in the diagnosis of acute decompensated heart failure (ADHF) in patients presenting with signs and symptoms of ADHF to the emergency department (ED). In addition, NT-proBNP of <300 pg/mL indicates ADHF is not likely.    Age Range Result Interpretation  NT-proBNP Concentration (pg/mL:      <50             Positive            >450                   Gray                 300-450                    Negative             <300    50-75           Positive            >900                  Gray                300-900                  Negative            <300      >75             Positive            >1800                  Gray                300-1800                  Negative            <300    T4, Free [[id number]]  (Normal) Collected: 09/18/24 0023    Specimen: Blood Updated: 09/18/24 0116     Free T4 0.98 ng/dL     TSH [[id number]]  (Abnormal) Collected: 09/18/24 0023    Specimen: Blood Updated: 09/18/24 0116     TSH 0.091 uIU/mL     Magnesium [[id number]]  (Normal) Collected: 09/18/24 0023    Specimen: Blood Updated: 09/18/24 0121     Magnesium 1.6 mg/dL     CBC Auto Differential [[id number]]  (Abnormal) Collected: 09/18/24 0023    Specimen: Blood Updated: 09/18/24 0041     WBC 21.21 10*3/mm3      RBC 1.35 10*6/mm3      Hemoglobin 5.6 g/dL      Hematocrit 15.6 %      .6 fL      MCH 41.5 pg      MCHC 35.9 g/dL      RDW 14.6 %      RDW-SD 60.0 fl      MPV 10.4 fL      Platelets 137 10*3/mm3     Manual Differential [[id number]]  (Abnormal) Collected: 09/18/24 0023    Specimen: Blood Updated: 09/18/24 0134     Neutrophil % 98.0 %      Lymphocyte % 1.0 %      Monocyte % 1.0 %      Neutrophils Absolute 20.79 10*3/mm3      Lymphocytes Absolute 0.21 10*3/mm3      Monocytes Absolute 0.21 10*3/mm3       "RBC Morphology Normal     WBC Morphology Normal     Platelet Morphology Normal    Procalcitonin [[id number]]  (Abnormal) Collected: 09/18/24 0023    Specimen: Blood Updated: 09/18/24 0145     Procalcitonin 1.41 ng/mL     Narrative:      As a Marker for Sepsis (Non-Neonates):    1. <0.5 ng/mL represents a low risk of severe sepsis and/or septic shock.  2. >2 ng/mL represents a high risk of severe sepsis and/or septic shock.    As a Marker for Lower Respiratory Tract Infections that require antibiotic therapy:    PCT on Admission    Antibiotic Therapy       6-12 Hrs later    >0.5                Strongly Recommended  >0.25 - <0.5        Recommended   0.1 - 0.25          Discouraged              Remeasure/reassess PCT  <0.1                Strongly Discouraged     Remeasure/reassess PCT    As 28 day mortality risk marker: \"Change in Procalcitonin Result\" (>80% or <=80%) if Day 0 (or Day 1) and Day 4 values are available. Refer to http://www.[facility]-pct-calculator.com    Change in PCT <=80%  A decrease of PCT levels below or equal to 80% defines a positive change in PCT test result representing a higher risk for 28-day all-cause mortality of patients diagnosed with severe sepsis for septic shock.    Change in PCT >80%  A decrease of PCT levels of more than 80% defines a negative change in PCT result representing a lower risk for 28-day all-cause mortality of patients diagnosed with severe sepsis or septic shock.       Phosphorus [[id number]]  (Abnormal) Collected: 09/18/24 0023    Specimen: Blood Updated: 09/18/24 0302     Phosphorus 9.5 mg/dL     Urinalysis With Microscopic If Indicated (No Culture) - Urine, Catheter [[id number]]  (Abnormal) Collected: 09/18/24 0027    Specimen: Urine, Catheter Updated: 09/18/24 0221     Color, UA Yellow     Appearance, UA Cloudy     pH, UA 5.5     Specific Gravity, UA 1.014     Glucose, UA Negative     Ketones, UA 15 mg/dL (1+)     Bilirubin, UA Negative     Blood, UA Large (3+)     " Protein,  mg/dL (2+)     Leuk Esterase, UA Moderate (2+)     Nitrite, UA Negative     Urobilinogen, UA 1.0 E.U./dL    Urinalysis, Microscopic Only - Urine, Catheter [[id number]]  (Abnormal) Collected: 09/18/24 0027    Specimen: Urine, Catheter Updated: 09/18/24 0221     RBC, UA 0-2 /HPF      WBC, UA Too Numerous to Count /HPF      Bacteria, UA 4+ /HPF      Squamous Epithelial Cells, UA 0-2 /HPF      Hyaline Casts, UA 3-6 /LPF      Methodology Automated Microscopy    Urine Culture - Urine, Straight Cath [[id number]] Collected: 09/18/24 0027    Specimen: Urine from Straight Cath Updated: 09/18/24 0221    Blood Culture - Blood, Hand, Left [[id number]] Collected: 09/18/24 0145    Specimen: Blood from Hand, Left Updated: 09/18/24 0152    Blood Culture - Blood, Arm, Left [[id number]] Collected: 09/18/24 0145    Specimen: Blood from Arm, Left Updated: 09/18/24 0152    Lactic Acid, Plasma [[id number]]  (Normal) Collected: 09/18/24 0145    Specimen: Blood Updated: 09/18/24 0216     Lactate 1.8 mmol/L     High Sensitivity Troponin T 2Hr [[id number]]  (Abnormal) Collected: 09/18/24 0300    Specimen: Blood Updated: 09/18/24 0335     HS Troponin T 600 ng/L      Troponin T Delta -65 ng/L     Narrative:      High Sensitive Troponin T Reference Range:  <14.0 ng/L- Negative Female for AMI  <22.0 ng/L- Negative Male for AMI  >=14 - Abnormal Female indicating possible myocardial injury.  >=22 - Abnormal Male indicating possible myocardial injury.   Clinicians would have to utilize clinical acumen, EKG, Troponin, and serial changes to determine if it is an Acute Myocardial Infarction or myocardial injury due to an underlying chronic condition.         Iron Profile [[id number]]  (Abnormal) Collected: 09/18/24 0300    Specimen: Blood Updated: 09/18/24 0423     Iron 50 mcg/dL      Iron Saturation (TSAT) 35 %      Transferrin 96 mg/dL      TIBC 143 mcg/dL     Ferritin [[id number]]  (Abnormal) Collected: 09/18/24 0300    Specimen: Blood  Updated: 09/18/24 0430     Ferritin 1,835.00 ng/mL     Narrative:      Results may be falsely decreased if patient taking Biotin.      S. Pneumo Ag Urine or CSF - Urine, Urine, Clean Catch [[id number]]  (Normal) Collected: 09/18/24 0450    Specimen: Urine, Clean Catch Updated: 09/18/24 0706     Strep Pneumo Ag Negative    Legionella Antigen, Urine - Urine, Urine, Clean Catch [[id number]]  (Normal) Collected: 09/18/24 0450    Specimen: Urine, Clean Catch Updated: 09/18/24 0706     LEGIONELLA ANTIGEN, URINE Negative    Respiratory Panel PCR w/COVID-19(SARS-CoV-2) DEBBIE/KENA/BILL/PAD/COR/BRE In-House, NP Swab in UTM/VTM, 2 HR TAT - Swab, Nasopharynx [[id number]]  (Normal) Collected: 09/18/24 0450    Specimen: Swab from Nasopharynx Updated: 09/18/24 0703     ADENOVIRUS, PCR Not Detected     Coronavirus 229E Not Detected     Coronavirus HKU1 Not Detected     Coronavirus NL63 Not Detected     Coronavirus OC43 Not Detected     COVID19 Not Detected     Human Metapneumovirus Not Detected     Human Rhinovirus/Enterovirus Not Detected     Influenza A PCR Not Detected     Influenza B PCR Not Detected     Parainfluenza Virus 1 Not Detected     Parainfluenza Virus 2 Not Detected     Parainfluenza Virus 3 Not Detected     Parainfluenza Virus 4 Not Detected     RSV, PCR Not Detected     Bordetella pertussis pcr Not Detected     Bordetella parapertussis PCR Not Detected     Chlamydophila pneumoniae PCR Not Detected     Mycoplasma pneumo by PCR Not Detected    Narrative:      In the setting of a positive respiratory panel with a viral infection PLUS a negative procalcitonin without other underlying concern for bacterial infection, consider observing off antibiotics or discontinuation of antibiotics and continue supportive care. If the respiratory panel is positive for atypical bacterial infection (Bordetella pertussis, Chlamydophila pneumoniae, or Mycoplasma pneumoniae), consider antibiotic de-escalation to target atypical bacterial  infection.    Comprehensive Metabolic Panel [[id number]]  (Abnormal) Collected: 09/18/24 0609    Specimen: Blood Updated: 09/18/24 0701     Glucose 99 mg/dL       mg/dL      Creatinine 6.82 mg/dL      Sodium 138 mmol/L      Potassium 2.6 mmol/L      Chloride 100 mmol/L      CO2 11.0 mmol/L      Calcium 6.5 mg/dL      Total Protein 5.2 g/dL      Albumin 2.6 g/dL      ALT (SGPT) 16 U/L      AST (SGOT) 27 U/L      Alkaline Phosphatase 98 U/L      Total Bilirubin 0.6 mg/dL      Globulin 2.6 gm/dL      A/G Ratio 1.0 g/dL      BUN/Creatinine Ratio 17.2     Anion Gap 27.0 mmol/L      eGFR 8.2 mL/min/1.73      Comment: <15 Indicative of kidney failure       Narrative:      GFR Normal >60  Chronic Kidney Disease <60  Kidney Failure <15      CBC (No Diff) [[id number]]  (Abnormal) Collected: 09/18/24 0609    Specimen: Blood Updated: 09/18/24 0638     WBC 22.43 10*3/mm3      RBC 1.74 10*6/mm3      Hemoglobin 6.4 g/dL      Hematocrit 19.1 %      .8 fL      MCH 36.8 pg      MCHC 33.5 g/dL      RDW 18.8 %      RDW-SD 75.3 fl      MPV 10.1 fL      Platelets 124 10*3/mm3     Folate [[id number]]  (Normal) Collected: 09/18/24 0609    Specimen: Blood Updated: 09/18/24 0700     Folate >20.00 ng/mL     Narrative:      Results may be falsely increased if patient taking Biotin.      T3, Free [[id number]]  (Abnormal) Collected: 09/18/24 0609    Specimen: Blood Updated: 09/18/24 0721     T3, Free 0.97 pg/mL     Narrative:      Results may be falsely increased if patient taking Biotin.      Magnesium [[id number]]  (Normal) Collected: 09/18/24 0609    Specimen: Blood Updated: 09/18/24 0713     Magnesium 2.2 mg/dL     Phosphorus [[id number]]  (Abnormal) Collected: 09/18/24 0609    Specimen: Blood Updated: 09/18/24 0710     Phosphorus 8.5 mg/dL             Imaging Results (Last 24 Hours)       Procedure Component Value Units Date/Time    XR ELBOW 2 VIEW LEFT [[id number]] Collected: 09/18/24 0225     Updated: 09/18/24 0226    Addenda:         ADDENDUM:  Patient: KATHI CASTELLANOS  Time Out: 02:25  Exam(s): XR LEFT ELBOW      Added by Chaparro Prince MD on 9 18 2024 2:25 AM (-04:00)  THE MEASUREMENT OF DISPLACEMENT WAS UPDATED:    EXAM:    XR Left Elbow Complete, 3 or More Views    CLINICAL HISTORY:     Reason for exam: fall, elbow pain.    TECHNIQUE:    Frontal, lateral and oblique views of the left elbow.    COMPARISON:    No relevant prior studies available.    FINDINGS:    Bones joints:  Displaced, mildly comminuted fracture of the olecranon,   which is displaced by proximally 1.5 cm.  No dislocation.    Soft tissues:  Unremarkable.    IMPRESSION:         Displaced, mildly comminuted fracture of the olecranon, which is   displaced by proximally 1.5 cm.      Signed: 09/18/24 0225 by Chaparro Prince MD    Narrative:        Patient: KATHI CASTELLANOS  Time Out: 02:24  Exam(s): XR LEFT ELBOW     EXAM:    XR Left Elbow Complete, 3 or More Views    CLINICAL HISTORY:     Reason for exam: fall, elbow pain.    TECHNIQUE:    Frontal, lateral and oblique views of the left elbow.    COMPARISON:    No relevant prior studies available.    FINDINGS:    Bones joints:  Displaced, mildly comminuted fracture of the olecranon,   which is displaced by proximally 5 mm.  No dislocation.    Soft tissues:  Unremarkable.    IMPRESSION:         Displaced, mildly comminuted fracture of the olecranon, which is   displaced by proximally 5 mm.      Impression:          Electronically signed by Chaparro Prince MD on 09-18-24 at 0224    XR Chest 1 View [[id number]] Collected: 09/18/24 0159     Updated: 09/18/24 0159    Narrative:        Patient: KATHI CASTELLANOS  Time Out: 01:59  Exam(s): XR CXR 1 VIEW     EXAM:    XR Chest, 1 View    CLINICAL HISTORY:     Reason for exam: gen weakness.    TECHNIQUE:    Frontal view of the chest.    COMPARISON:    No relevant prior studies available.    FINDINGS:    Lungs:  Consolidation in the medial aspect of the RIGHT lower lung,   concerning for pneumonia.    Pleural  space:  Unremarkable.  No pneumothorax.    Heart:  Unremarkable.  No cardiomegaly.    Mediastinum:  Unremarkable.  Normal mediastinal contour.    Bones joints:  No acute fracture.  Old left-sided rib fractures    IMPRESSION:         Consolidation in the medial aspect of the RIGHT lower lung, concerning   for pneumonia.      Impression:          Electronically signed by Chaparro Prince MD on 09-18-24 at 0159                ECG 12 Lead Other; weakness   Preliminary Result   HEART RATE=90  bpm   RR Hxaplwws=665  ms   CT Gtvzsuym=637  ms   P Horizontal Axis=-12  deg   P Front Axis=60  deg   QRSD Qvhwhyqr=066  ms   QT Vyhjnkai=605  ms   MMmW=050  ms   QRS Axis=-51  deg   T Wave Axis=106  deg   - ABNORMAL ECG -   Sinus rhythm   Left bundle branch block   Baseline wander in lead(s) V2   Date and Time of Study:2024-09-18 00:37:53      Telemetry Scan   Final Result      Telemetry Scan   Final Result           Assessment/Plan     Active Hospital Problems    Diagnosis  POA    **Sepsis [A41.9]  Yes    SHANA (acute kidney injury) [N17.9]  Yes    Hypokalemia [E87.6]  Unknown    CAD (coronary artery disease) [I25.10]  Unknown    Alcohol use disorder [F10.90]  Unknown    Macrocytic anemia [D53.9]  Unknown      Resolved Hospital Problems   No resolved problems to display.     Patient is a 68-year-old male history of hypertension, hyperlipidemia, depression, anxiety, presents to the ED complaining of generalized weakness, lightheadedness/dizziness.    Sepsis?  -WBC 21.21 on admission, patient was afebrile  -Procalcitonin was elevated at 1.451, however in the setting of significant SHANA  -Chest x-ray showed consolidation in the medial aspect of the RIGHT lower lung, concerning   for pneumonia.  -Urinalysis was positive for WBC, 4 blood bacteria, moderate leukocytes, patient denies any symptoms  -Strep and urine antigen, respiratory panel negative  -Blood cultures pending  -Continue IV ceftriaxone 2 g every 24  hours,    SHANA/hyperkalemia  -Likely multifactorial, secondary to decreased p.o. intake, anemia,  -Creatinine on admission was 7.08, last creatinine available in the system on 12/27/2022 was 0.79.  Potassium was 2.4 on admission  -On lisinopril outpatient, hold in the setting of SHANA  -Repeat creatinine improving, potassium 2.6  -Nephrology consult  -Continue IV fluids.  Ordered additional potassium replacement    Alcohol use disorder  -initiated CIWA protocol, multivitamins  -Access consult    Hypothyroidism  -TSH is low at 0.091, free T3 low at 0.97, and free T4 low end of normal at 0.98  -Thyroid function labs affected second to acute illness, however in the setting of severe weakness we will start on low-dose levothyroxine 25 mcg daily for now    COPD?  -Not on inhalers outpatient  -Patient with diminished lung sounds, mild wheezing  -Prior CT with emphysema  -Daily tobacco user  -Initiated on DuoNebs, as needed albuterol      Macrocytic anemia/history of B12 deficiency  -Vitamin B12 1 07/16/2024 was 157, will repeat  -Hemoglobin was 5.6 on admission, status post 2 units of PRBC transfused, hemoglobin improved.  Repeat CBC ordered this afternoon.  -Hematology consult      Major depression, recurrent, chronic  -Continue citalopram    Elevated troponin  -History of coronary artery disease status post multiple stents  -Initial troponin was 665 on admission, repeat 600.  The setting of severe SHANA.  -Patient denies chest pain  -EKG shows sinus rhythm with LBBB  -On metoprolol, statin outpatient.  Not on aspirin  -Resume statin, hold metoprolol secondary to SHANA, soft BP  -Cardiology consult      Tobacco use disorder  -Smokes 1 pack/day.    I discussed the patient's findings and my recommendations with patient.    VTE Prophylaxis - SCDs.  Code Status - Full code.       Ricki Flood MD  [facility]  09/18/24  10:21 EDT

## 2024-09-18 NOTE — CONSULTS
Date of Hospital Visit: 2024  Encounter Provider: Edgardo Ordonez MD  Place of Service: [facility] CARDIOLOGY  Patient Name: Dixon Montoya  :1955  Referral Provider: Mark Bravo MD    Chief complaint generalized weakness     History of Present Illness Dixon Montoya is a 68 year old pt who was previously been seen once by [facility] with a history of  CAD s/p multiple PCI's with total of 5 stents , alcohol abuse, COPD with bronchitis, anxiety, depression, hyperlipidemia, PAD status post 2 stents and hypertension.     Pt reported his stents were placed in [place] in .  He is unaware of any details other than the fact that he has 5 stents.  No other cardiac testing has been performed in the [place] area.  Previous EKG in  showed left anterior fascicular block.    Patient was last seen by [place] [facility] specialist on 2023 to establish care.  He denied any cardiac complaints.  Denied claudication.  He did report shortness of breath that is secondary to COPD and bronchitis and has improved over the last year with use of his treatments.  He reported compliance with his medications.  Patient's blood pressure was elevated at 154/74.  He was advised to continue lisinopril 30 mg daily, metoprolol XL 25 mg daily and amlodipine 5 mg daily was added.  He was on an aspirin and statin for management of coronary artery disease.    Pt presented to the ER on 2024 with complaints of generalized weakness.  Patient reported progressive weakness for the last 3 weeks to the point that he could barely get back to the couch from his bathroom.  He also reported some lightheadedness and dizziness.  He denies angina.  No orthopnea, PND or edema.  Patient denied any fever, vomiting, diarrhea, urinary symptoms or chest pain.  He has a chronic cough that he states is unchanged.  Patient denied any hematemesis/hemoptysis, melena or bloody stools.  In ER, , CR 7.08, K 2.3, CL 96, troponin T  665, pro BNP 17,683, free T4 0.98, TSH 0.091, WBC 21.21, HGB 5.6, UA negative for nitrates and 4 + bacteria, EKG showed normal sinus rhythm rate of 90, left bundle branch block.  Patient started on IV fluids . Patient admitted for sepsis.     The patient smokes a pack of cigarettes daily and drinks a 1.75 L bottle of rum every 3 days.  He smokes marijuana routinely.    HPI was reviewed, updated and amended when necessary.            Past Medical History:   Diagnosis Date    Anxiety     Depression     Hyperlipidemia     Hypertension        History reviewed. No pertinent surgical history.    (Not in a hospital admission)      Current Meds  Scheduled Meds:potassium chloride, 10 mEq, Intravenous, Once  sodium chloride, 10 mL, Intravenous, Q12H      Continuous Infusions:sodium chloride, 100 mL/hr, Last Rate: 100 mL/hr (09/18/24 0607)      PRN Meds:.  acetaminophen **OR** acetaminophen **OR** acetaminophen    aluminum-magnesium hydroxide-simethicone    senna-docusate sodium **AND** polyethylene glycol **AND** bisacodyl **AND** bisacodyl    ondansetron ODT **OR** ondansetron    Allergies as of 09/17/2024    (No Known Allergies)       Social History     Socioeconomic History    Marital status:    Tobacco Use    Smoking status: Every Day     Current packs/day: 1.00     Types: Cigarettes   Substance and Sexual Activity    Alcohol use: Yes     Alcohol/week: 4.0 standard drinks of alcohol     Types: 4 Shots of liquor per week    Drug use: Yes     Types: Marijuana     Past surgical, medical, social and family history was reviewed, updated and amended when necessary.    Review of Systems   Constitutional: Positive for malaise/fatigue. Negative for chills and fever.   HENT:  Negative for hoarse voice and sore throat.    Eyes:  Negative for double vision and photophobia.   Cardiovascular:  Positive for dyspnea on exertion. Negative for chest pain, leg swelling, near-syncope, orthopnea, palpitations, paroxysmal nocturnal  "dyspnea and syncope.   Respiratory:  Positive for cough. Negative for wheezing.    Skin:  Negative for poor wound healing and rash.   Musculoskeletal:  Negative for arthritis and joint swelling.   Gastrointestinal:  Negative for bloating, abdominal pain, hematemesis and hematochezia.   Neurological:  Positive for dizziness. Negative for focal weakness.   Psychiatric/Behavioral:  Negative for depression and suicidal ideas.             Objective:   Temp:  [96.8 °F (36 °C)-97.7 °F (36.5 °C)] 97.7 °F (36.5 °C)  Heart Rate:  [89-96] 90  Resp:  [16-22] 18  BP: ()/(54-65) 114/64  Body mass index is 26.3 kg/m².  Flowsheet Rows      Flowsheet Row First Filed Value   Admission Height 172.7 cm (68\") Documented at 09/17/2024 2341   Admission Weight 78.5 kg (173 lb) Documented at 09/17/2024 2341          Vitals:    09/18/24 0603   BP: 114/64   Pulse: 90   Resp: 18   Temp: 97.7 °F (36.5 °C)   SpO2: 95%       Vitals reviewed.   Constitutional:       Appearance: Frail. Chronically ill-appearing.   Neck:      Vascular: No JVR. JVD normal.   Pulmonary:      Effort: Pulmonary effort is normal.      Breath sounds: No wheezing. No rhonchi. No rales.   Chest:      Chest wall: Not tender to palpatation.   Cardiovascular:      PMI at left midclavicular line. Normal rate. Regular rhythm. Normal S1. Normal S2.       Murmurs: There is no murmur.      No gallop.  No click. No rub.   Pulses:     Intact distal pulses.   Edema:     Peripheral edema absent.   Abdominal:      General: Bowel sounds are normal.      Palpations: Abdomen is soft.      Tenderness: There is no abdominal tenderness.   Musculoskeletal: Normal range of motion.         General: No tenderness. Skin:     General: Skin is warm and dry.   Neurological:      General: No focal deficit present.      Mental Status: Alert and oriented to person, place and time.                 Lab Review:      Results from last 7 days   Lab Units 09/18/24  0023   SODIUM mmol/L 138   POTASSIUM " mmol/L 2.3*   CHLORIDE mmol/L 96*   CO2 mmol/L 11.5*   BUN mg/dL 129*   CREATININE mg/dL 7.08*   CALCIUM mg/dL 7.3*   BILIRUBIN mg/dL 0.7   ALK PHOS U/L 105   ALT (SGPT) U/L 19   AST (SGOT) U/L 31   GLUCOSE mg/dL 149*     Results from last 7 days   Lab Units 09/18/24  0300 09/18/24  0023   HSTROP T ng/L 600* 665*     @LABRCNTbnp@  Results from last 7 days   Lab Units 09/18/24  0609 09/18/24  0023   WBC 10*3/mm3 22.43* 21.21*   HEMOGLOBIN g/dL 6.4* 5.6*   HEMATOCRIT % 19.1* 15.6*   PLATELETS 10*3/mm3 124* 137*         Results from last 7 days   Lab Units 09/18/24  0023   MAGNESIUM mg/dL 1.6     @LABRCNTIP(chol,trig,hdl,ldl)              I personally viewed and interpreted the patient's EKG/Telemetry data    Sepsis    SHANA (acute kidney injury)    Assessment and Plan:    NSTEMI -uncertain as to whether or not this is type I versus type II.  Troponin is 600 but trending down and echocardiogram shows akinetic apical segments with EF of 35 to 40%.  The chronicity of this finding is uncertain as the patient has not been routinely followed and there is no local recent testing.  We will do our best to get records from the [place] area.  Presentation is not consistent with acute coronary syndrome.  Patient will need workup for severe anemia in the setting of alcohol abuse.  Nephrology following for acute kidney injury.  On exam he actually seems hypovolemic.  No indication for urgent testing from a cardiac perspective and we will need some of these issues diagnosed and treated before we could consider invasive workup.  Furthermore his severe immobility may have led to rhabdomyolysis.  CK levels ordered by nephrology.  We will follow laboratory surveillance.  Continue supportive therapy I will discuss with Dr. Robison.  Systolic heart failure -of uncertain chronicity.  He actually seems hypovolemic at this time.  Would not start ACE/ARB/ARNI or MRA in the setting of acute kidney injury.  Blood pressure is soft but after  cautious fluid resuscitation we will see about restarting beta-blocker.  Tobacco abuse  Alcohol abuse  SHANA -nephrology following  Abnormal chest x-ray -possible pneumonia defer workup and management to internal medicine  Coronary artery disease -holding aspirin and statin at this time given clinical findings on presentation    Edgardo Ordonez MD  09/18/24  06:46 EDT.  Time spent in reviewing chart, discussion and examination:

## 2024-09-18 NOTE — CONSULTS
"  Referring Provider: Dr. Flood  Reason for Consultation: Acute kidney injury, hypokalemia.    Subjective     Chief complaint   Chief Complaint   Patient presents with    Weakness - Generalized       History of present illness: 68-year-old white male with a history of hypertension, remote history of coronary artery disease status post stent placement he says in 2004.  Active daily alcohol use.  Has been ill for several weeks.  Reports increasing weakness.  Inability to get off the couch.  Decrease in his urine output.  Says his bowels are not moving as he has not been eating.  Last night became concerned about his weakness and called EMS.  In the emergency room he was found to have multiple abnormalities including a hemoglobin of 5.6, creatinine of 7, potassium 2.3.   Past Medical History:   Diagnosis Date    Anxiety     Depression     Hyperlipidemia     Hypertension      History reviewed. No pertinent surgical history.  History reviewed. No pertinent family history.  Lives alone  Social History     Tobacco Use    Smoking status: Every Day     Current packs/day: 1.00     Types: Cigarettes   Substance Use Topics    Alcohol use: Yes     Alcohol/week: 4.0 standard drinks of alcohol     Types: 4 Shots of liquor per week    Drug use: Yes     Types: Marijuana     (Not in a hospital admission)    Allergies:  Patient has no known allergies.    Review of Systems  Left elbow pain.     Objective     Vital Signs  Temp:  [96.8 °F (36 °C)-98.7 °F (37.1 °C)] 96.9 °F (36.1 °C)  Heart Rate:  [89-96] 92  Resp:  [16-22] 18  BP: ()/(51-74) 108/67    Flowsheet Rows      Flowsheet Row First Filed Value   Admission Height 172.7 cm (68\") Documented at 09/17/2024 2341   Admission Weight 78.5 kg (173 lb) Documented at 09/17/2024 2341             I/O this shift:  In: 483.8 [Blood:383.8; IV Piggyback:100]  Out: -   I/O last 3 completed shifts:  In: 2000 [I.V.:250; Blood:300; IV Piggyback:1450]  Out: 250 [Urine:250]    Intake/Output " Summary (Last 24 hours) at 9/18/2024 1135  Last data filed at 9/18/2024 0912  Gross per 24 hour   Intake 2483.75 ml   Output 250 ml   Net 2233.75 ml       Physical Exam:  General Appearance: Very chronically ill.  Awake.  Answers appropriately but sometimes slow to answer.  Skin: Multiple ecchymoses.  HEENT: pupils round and reactive to light, oral mucosa very dry with poor dentition.  Nonicteric sclera  Neck: supple, no JVD, trachea midline  Lungs: Coarse rhonchi right upper lobe right lower lobe.  On 2 L nasal cannula.  No wheezing.  Few rales left base.  Heart: RRR, normal S1 and S2, no S3, no rub  Abdomen: soft, nontender,+bs  : no palpable bladder  Extremities:no edema.  Left elbow very tender.  Neuro: Speech normal.  Mental status a little slow.  No myoclonic jerking.  No asterixis.    Results Review:  Results for orders placed or performed during the hospital encounter of 09/17/24   S. Pneumo Ag Urine or CSF - Urine, Urine, Clean Catch    Specimen: Urine, Clean Catch   Result Value Ref Range    Strep Pneumo Ag Negative Negative   Legionella Antigen, Urine - Urine, Urine, Clean Catch    Specimen: Urine, Clean Catch   Result Value Ref Range    LEGIONELLA ANTIGEN, URINE Negative Negative   Respiratory Panel PCR w/COVID-19(SARS-CoV-2) DEBBIE/KENA/BILL/PAD/COR/BRE In-House, NP Swab in UTM/VTM, 2 HR TAT - Swab, Nasopharynx    Specimen: Nasopharynx; Swab   Result Value Ref Range    ADENOVIRUS, PCR Not Detected Not Detected    Coronavirus 229E Not Detected Not Detected    Coronavirus HKU1 Not Detected Not Detected    Coronavirus NL63 Not Detected Not Detected    Coronavirus OC43 Not Detected Not Detected    COVID19 Not Detected Not Detected - Ref. Range    Human Metapneumovirus Not Detected Not Detected    Human Rhinovirus/Enterovirus Not Detected Not Detected    Influenza A PCR Not Detected Not Detected    Influenza B PCR Not Detected Not Detected    Parainfluenza Virus 1 Not Detected Not Detected    Parainfluenza  Virus 2 Not Detected Not Detected    Parainfluenza Virus 3 Not Detected Not Detected    Parainfluenza Virus 4 Not Detected Not Detected    RSV, PCR Not Detected Not Detected    Bordetella pertussis pcr Not Detected Not Detected    Bordetella parapertussis PCR Not Detected Not Detected    Chlamydophila pneumoniae PCR Not Detected Not Detected    Mycoplasma pneumo by PCR Not Detected Not Detected   Comprehensive Metabolic Panel    Specimen: Blood   Result Value Ref Range    Glucose 149 (H) 65 - 99 mg/dL     (H) 8 - 23 mg/dL    Creatinine 7.08 (H) 0.76 - 1.27 mg/dL    Sodium 138 136 - 145 mmol/L    Potassium 2.3 (C) 3.5 - 5.2 mmol/L    Chloride 96 (L) 98 - 107 mmol/L    CO2 11.5 (L) 22.0 - 29.0 mmol/L    Calcium 7.3 (L) 8.6 - 10.5 mg/dL    Total Protein 5.8 (L) 6.0 - 8.5 g/dL    Albumin 2.9 (L) 3.5 - 5.2 g/dL    ALT (SGPT) 19 1 - 41 U/L    AST (SGOT) 31 1 - 40 U/L    Alkaline Phosphatase 105 39 - 117 U/L    Total Bilirubin 0.7 0.0 - 1.2 mg/dL    Globulin 2.9 gm/dL    A/G Ratio 1.0 g/dL    BUN/Creatinine Ratio 18.2 7.0 - 25.0    Anion Gap 30.5 (H) 5.0 - 15.0 mmol/L    eGFR 7.9 (L) >60.0 mL/min/1.73   Urinalysis With Microscopic If Indicated (No Culture) - Urine, Catheter    Specimen: Urine, Catheter   Result Value Ref Range    Color, UA Yellow Yellow, Straw    Appearance, UA Cloudy (A) Clear    pH, UA 5.5 5.0 - 8.0    Specific Gravity, UA 1.014 1.005 - 1.030    Glucose, UA Negative Negative    Ketones, UA 15 mg/dL (1+) (A) Negative    Bilirubin, UA Negative Negative    Blood, UA Large (3+) (A) Negative    Protein,  mg/dL (2+) (A) Negative    Leuk Esterase, UA Moderate (2+) (A) Negative    Nitrite, UA Negative Negative    Urobilinogen, UA 1.0 E.U./dL 0.2 - 1.0 E.U./dL   High Sensitivity Troponin T    Specimen: Blood   Result Value Ref Range    HS Troponin T 665 (C) <22 ng/L   BNP    Specimen: Blood   Result Value Ref Range    proBNP 17,683.0 (H) 0.0 - 900.0 pg/mL   T4, Free    Specimen: Blood   Result Value  Ref Range    Free T4 0.98 0.92 - 1.68 ng/dL   TSH    Specimen: Blood   Result Value Ref Range    TSH 0.091 (L) 0.270 - 4.200 uIU/mL   Magnesium    Specimen: Blood   Result Value Ref Range    Magnesium 1.6 1.6 - 2.4 mg/dL   CBC Auto Differential    Specimen: Blood   Result Value Ref Range    WBC 21.21 (H) 3.40 - 10.80 10*3/mm3    RBC 1.35 (L) 4.14 - 5.80 10*6/mm3    Hemoglobin 5.6 (C) 13.0 - 17.7 g/dL    Hematocrit 15.6 (C) 37.5 - 51.0 %    .6 (H) 79.0 - 97.0 fL    MCH 41.5 (H) 26.6 - 33.0 pg    MCHC 35.9 (H) 31.5 - 35.7 g/dL    RDW 14.6 12.3 - 15.4 %    RDW-SD 60.0 (H) 37.0 - 54.0 fl    MPV 10.4 6.0 - 12.0 fL    Platelets 137 (L) 140 - 450 10*3/mm3   Urinalysis, Microscopic Only - Urine, Catheter    Specimen: Urine, Catheter   Result Value Ref Range    RBC, UA 0-2 None Seen, 0-2 /HPF    WBC, UA Too Numerous to Count (A) None Seen, 0-2 /HPF    Bacteria, UA 4+ (A) None Seen /HPF    Squamous Epithelial Cells, UA 0-2 None Seen, 0-2 /HPF    Hyaline Casts, UA 3-6 None Seen /LPF    Methodology Automated Microscopy    Manual Differential    Specimen: Blood   Result Value Ref Range    Neutrophil % 98.0 (H) 42.7 - 76.0 %    Lymphocyte % 1.0 (L) 19.6 - 45.3 %    Monocyte % 1.0 (L) 5.0 - 12.0 %    Neutrophils Absolute 20.79 (H) 1.70 - 7.00 10*3/mm3    Lymphocytes Absolute 0.21 (L) 0.70 - 3.10 10*3/mm3    Monocytes Absolute 0.21 0.10 - 0.90 10*3/mm3    RBC Morphology Normal Normal    WBC Morphology Normal Normal    Platelet Morphology Normal Normal   Lactic Acid, Plasma    Specimen: Blood   Result Value Ref Range    Lactate 1.8 0.5 - 2.0 mmol/L   Procalcitonin    Specimen: Blood   Result Value Ref Range    Procalcitonin 1.41 (H) 0.00 - 0.25 ng/mL   High Sensitivity Troponin T 2Hr    Specimen: Blood   Result Value Ref Range    HS Troponin T 600 (C) <22 ng/L    Troponin T Delta -65 (L) >=-4 - <+4 ng/L   Phosphorus    Specimen: Blood   Result Value Ref Range    Phosphorus 9.5 (H) 2.5 - 4.5 mg/dL   Comprehensive Metabolic  Panel    Specimen: Blood   Result Value Ref Range    Glucose 99 65 - 99 mg/dL     (H) 8 - 23 mg/dL    Creatinine 6.82 (H) 0.76 - 1.27 mg/dL    Sodium 138 136 - 145 mmol/L    Potassium 2.6 (C) 3.5 - 5.2 mmol/L    Chloride 100 98 - 107 mmol/L    CO2 11.0 (L) 22.0 - 29.0 mmol/L    Calcium 6.5 (L) 8.6 - 10.5 mg/dL    Total Protein 5.2 (L) 6.0 - 8.5 g/dL    Albumin 2.6 (L) 3.5 - 5.2 g/dL    ALT (SGPT) 16 1 - 41 U/L    AST (SGOT) 27 1 - 40 U/L    Alkaline Phosphatase 98 39 - 117 U/L    Total Bilirubin 0.6 0.0 - 1.2 mg/dL    Globulin 2.6 gm/dL    A/G Ratio 1.0 g/dL    BUN/Creatinine Ratio 17.2 7.0 - 25.0    Anion Gap 27.0 (H) 5.0 - 15.0 mmol/L    eGFR 8.2 (L) >60.0 mL/min/1.73   CBC (No Diff)    Specimen: Blood   Result Value Ref Range    WBC 22.43 (H) 3.40 - 10.80 10*3/mm3    RBC 1.74 (L) 4.14 - 5.80 10*6/mm3    Hemoglobin 6.4 (C) 13.0 - 17.7 g/dL    Hematocrit 19.1 (C) 37.5 - 51.0 %    .8 (H) 79.0 - 97.0 fL    MCH 36.8 (H) 26.6 - 33.0 pg    MCHC 33.5 31.5 - 35.7 g/dL    RDW 18.8 (H) 12.3 - 15.4 %    RDW-SD 75.3 (H) 37.0 - 54.0 fl    MPV 10.1 6.0 - 12.0 fL    Platelets 124 (L) 140 - 450 10*3/mm3   Iron Profile    Specimen: Blood   Result Value Ref Range    Iron 50 (L) 59 - 158 mcg/dL    Iron Saturation (TSAT) 35 20 - 50 %    Transferrin 96 (L) 200 - 360 mg/dL    TIBC 143 (L) 298 - 536 mcg/dL   Ferritin    Specimen: Blood   Result Value Ref Range    Ferritin 1,835.00 (H) 30.00 - 400.00 ng/mL   Folate    Specimen: Blood   Result Value Ref Range    Folate >20.00 4.78 - 24.20 ng/mL   T3, Free    Specimen: Blood   Result Value Ref Range    T3, Free 0.97 (L) 2.00 - 4.40 pg/mL   Magnesium    Specimen: Blood   Result Value Ref Range    Magnesium 2.2 1.6 - 2.4 mg/dL   Phosphorus    Specimen: Blood   Result Value Ref Range    Phosphorus 8.5 (H) 2.5 - 4.5 mg/dL   ECG 12 Lead Other; weakness   Result Value Ref Range    QT Interval 428 ms    QTC Interval 524 ms   Adult Transthoracic Echo Complete W/ Cont if Necessary Per  Protocol   Result Value Ref Range    EF(MOD-bp) 20.4 %    LVIDd 4.8 cm    LVIDs 4.2 cm    IVSd 0.89 cm    LVPWd 0.87 cm    FS 13.2 %    IVS/LVPW 1.02 cm    ESV(cubed) 73.9 ml    LV Sys Vol (BSA corrected) 64.5 cm2    EDV(cubed) 113.2 ml    LV Peoples Vol (BSA corrected) 77.5 cm2    LV mass(C)d 145.8 grams    LVOT area 3.0 cm2    LVOT diam 1.97 cm    EDV(MOD-sp2) 150.0 ml    EDV(MOD-sp4) 149.0 ml    ESV(MOD-sp2) 114.0 ml    ESV(MOD-sp4) 124.0 ml    SV(MOD-sp2) 36.0 ml    SV(MOD-sp4) 25.0 ml    SVi(MOD-SP2) 18.7 ml/m2    SVi(MOD-SP4) 13.0 ml/m2    SVi (LVOT) 30.0 ml/m2    EF(MOD-sp2) 24.0 %    EF(MOD-sp4) 16.8 %    MV E max chepe 96.7 cm/sec    MV A max chepe 66.0 cm/sec    MV dec time 0.13 sec    MV E/A 1.47     LA ESV Index (BP) 25.5 ml/m2    Med Peak E' Chepe 7.4 cm/sec    Lat Peak E' Chepe 11.9 cm/sec    Avg E/e' ratio 10.02     SV(LVOT) 57.7 ml    RV Base 2.9 cm    RV Mid 2.14 cm    RV Length 7.7 cm    TAPSE (>1.6) 1.71 cm    RV S' 14.0 cm/sec    Pulm Sys Chepe 40.3 cm/sec    Pulm Peoples Chepe 64.7 cm/sec    Pulm S/D 0.62     LV V1 max 108.3 cm/sec    LV V1 max PG 4.7 mmHg    LV V1 mean PG 2.32 mmHg    LV V1 VTI 19.0 cm    Ao pk chepe 131.9 cm/sec    Ao max PG 7.0 mmHg    Ao mean PG 4.3 mmHg    Ao V2 VTI 25.7 cm    BIB(I,D) 2.24 cm2    MV max PG 5.8 mmHg    MV mean PG 3.3 mmHg    MV V2 VTI 23.2 cm    MV P1/2t 40.4 msec    MVA(P1/2t) 5.4 cm2    MVA(VTI) 2.48 cm2    MV dec slope 882.0 cm/sec2    MR max chepe 504.9 cm/sec    MR max .0 mmHg    MR mean chepe 402.9 cm/sec    MR mean PG 71.7 mmHg    MR .8 cm    RV V1 max PG 2.12 mmHg    RV V1 max 72.8 cm/sec    RV V1 VTI 13.7 cm    PA V2 max 106.9 cm/sec    PA acc time 0.08 sec    Ao root diam 3.6 cm    ACS 2.05 cm    Sinus 3.2 cm    STJ 2.28 cm    Dimensionless Index 0.70 (DI)    RAP systole 3 mmHg    Ascending aorta 3.1 cm    Abdo Ao Diam 1.8 cm   Prepare RBC, 2 Units   Result Value Ref Range    Product Code V5001W27     Unit Number [id number]     UNIT  ABO O     UNIT  RH  POS     Crossmatch Interpretation Compatible     Dispense Status IS     Blood Expiration Date [id number]     Blood Type Barcode 5100     Product Code N6308V58     Unit Number [id number]     UNIT  ABO O     UNIT  RH POS     Crossmatch Interpretation Compatible     Dispense Status IS     Blood Expiration Date [id number]     Blood Type Barcode 5100    Type & Screen    Specimen: Blood   Result Value Ref Range    ABO Type O     RH type Positive     Antibody Screen Negative     T&S Expiration Date 9/21/2024 11:59:59 PM      Imaging Results (Last 72 Hours)       Procedure Component Value Units Date/Time    XR ELBOW 2 VIEW LEFT [[id number]] Collected: 09/18/24 0225     Updated: 09/18/24 0226    Addenda:        ADDENDUM:  Patient: KATHI CASTELLANOS  Time Out: 02:25  Exam(s): XR LEFT ELBOW      Added by Chaparro Prince MD on 9 18 2024 2:25 AM (-04:00)  THE MEASUREMENT OF DISPLACEMENT WAS UPDATED:    EXAM:    XR Left Elbow Complete, 3 or More Views    CLINICAL HISTORY:     Reason for exam: fall, elbow pain.    TECHNIQUE:    Frontal, lateral and oblique views of the left elbow.    COMPARISON:    No relevant prior studies available.    FINDINGS:    Bones joints:  Displaced, mildly comminuted fracture of the olecranon,   which is displaced by proximally 1.5 cm.  No dislocation.    Soft tissues:  Unremarkable.    IMPRESSION:         Displaced, mildly comminuted fracture of the olecranon, which is   displaced by proximally 1.5 cm.      Signed: 09/18/24 0225 by Chaparro Prince MD    Narrative:        Patient: KATHI CASTELLANOS  Time Out: 02:24  Exam(s): XR LEFT ELBOW     EXAM:    XR Left Elbow Complete, 3 or More Views    CLINICAL HISTORY:     Reason for exam: fall, elbow pain.    TECHNIQUE:    Frontal, lateral and oblique views of the left elbow.    COMPARISON:    No relevant prior studies available.    FINDINGS:    Bones joints:  Displaced, mildly comminuted fracture of the olecranon,   which is displaced by proximally 5 mm.  No  dislocation.    Soft tissues:  Unremarkable.    IMPRESSION:         Displaced, mildly comminuted fracture of the olecranon, which is   displaced by proximally 5 mm.      Impression:          Electronically signed by Chaparro Prince MD on 09-18-24 at 0224    XR Chest 1 View [[id number]] Collected: 09/18/24 0159     Updated: 09/18/24 0159    Narrative:        Patient: KATHI CASTELLANOS  Time Out: 01:59  Exam(s): XR CXR 1 VIEW     EXAM:    XR Chest, 1 View    CLINICAL HISTORY:     Reason for exam: gen weakness.    TECHNIQUE:    Frontal view of the chest.    COMPARISON:    No relevant prior studies available.    FINDINGS:    Lungs:  Consolidation in the medial aspect of the RIGHT lower lung,   concerning for pneumonia.    Pleural space:  Unremarkable.  No pneumothorax.    Heart:  Unremarkable.  No cardiomegaly.    Mediastinum:  Unremarkable.  Normal mediastinal contour.    Bones joints:  No acute fracture.  Old left-sided rib fractures    IMPRESSION:         Consolidation in the medial aspect of the RIGHT lower lung, concerning   for pneumonia.      Impression:          Electronically signed by Chaparro Prince MD on 09-18-24 at 0159              [START ON 9/19/2024] cefTRIAXone, 2,000 mg, Intravenous, Q24H  citalopram, 20 mg, Oral, Daily  folic acid, 1 mg, Oral, Daily  ipratropium-albuterol, 3 mL, Nebulization, 4x Daily - RT  levothyroxine, 25 mcg, Oral, Q AM  LORazepam, 1 mg, Oral, Q6H   Followed by  [START ON 9/19/2024] LORazepam, 1 mg, Oral, Q6H   Followed by  [START ON 9/20/2024] LORazepam, 1 mg, Oral, Q12H   Followed by  [START ON 9/21/2024] LORazepam, 1 mg, Oral, Daily  metroNIDAZOLE, 500 mg, Intravenous, Q8H  sodium chloride, 10 mL, Intravenous, Q12H  thiamine (B-1) IV, 200 mg, Intravenous, Q8H   Followed by  [START ON 9/23/2024] thiamine, 100 mg, Oral, Daily      sodium chloride, 150 mL/hr, Last Rate: 150 mL/hr (09/18/24 1121)        Assessment & Plan   Acute kidney injury.  Likely multifactorial including volume depletion,  impaired auto renal regulation due to lisinopril, hypotension.  He had been on a statin and is unclear to me whether or not he may have been fairly immobile for some period of time.  Rhabdomyolysis also in the differential. Check CK.  His urinalysis shows large blood but few red cells that may be consistent with pigment.  Would not resume statin quite yet.  Also need to rule out obstructive component.  At this point no indication for acute dialysis, however if his renal function does not improve he may require dialysis.  Replace potassium and magnesium.  High  anion gap metabolic acidosis.  Check lactic acid, beta hydroxybutyrate.  Likely due to renal failure but need to rule out other causes as well.  No bicarb drip for now because of his severe hypokalemia.  Prerenal azotemia in the face of such a low hemoglobin suggest possible bleeding in the GI tract, as well.  Will Hemoccult stool.  2.  Active alcohol abuse plicating all medical issues.  Thiamine ordered.  3.  Anemia possible blood loss or CKD.  Check stool Hemoccult.  Macrocytic.  B12 and folate ordered.  4.  Right lower lobe pneumonia, leukocytosis and elevated procalcitonin.  Very high risk for aspiration pneumonia given his debility.  Ceftriaxone ordered and now Flagyl added.  5.  Evidence of peripheral vascular disease with cool digits and feet.  Check ankle-brachial index.  6.  Prior coronary artery disease status post stents in the remote past according to the patient.  Troponin elevated.  Echocardiogram pending.  Cardiology to see.  7.  Displaced mildly comminuted left olecranon fracture.  Plan:  Renal ultrasound  Replace potassium  Recheck chemistries and hepatitis B surface antigen at noon  Recheck hemoglobin at noon.  Check stool Hemoccult  Add Flagyl to ceftriaxone for aspiration pneumonia coverage.  Increase IV fluids 250 cc/h.  Low threshold for transferring the patient to the intensive care unit should his clinical status deteriorate.  8.   Discussed with Dr. Flood.  9. ABG.  I discussed the patient's findings and my recommendations with patient and bedside RN.  I have also spoken with Dr. Flood.    Layla Payne MD  09/18/24  11:35 EDT    Please note that portions of this note were completed with a voice recognition program.

## 2024-09-18 NOTE — CASE MANAGEMENT/SOCIAL WORK
Discharge Planning Assessment  [facility]     Patient Name: Dixon Montoya  MRN: [id number]  Today's Date: 9/18/2024    Admit Date: 9/17/2024    Plan: CCP will follow pt for discharge needs based on clinical course. ZACHARY Castañeda RN   Discharge Needs Assessment       Row Name 09/18/24 1431       Living Environment    People in Home alone    Current Living Arrangements home    Potentially Unsafe Housing Conditions none    In the past 12 months has the electric, gas, oil, or water company threatened to shut off services in your home? No    Primary Care Provided by self    Provides Primary Care For no one    Family Caregiver if Needed sibling(s)    Family Caregiver Names Sister ( Rajani Betancourt [phone])    Quality of Family Relationships unable to assess    Able to Return to Prior Arrangements yes    Living Arrangement Comments Pt lives alone in a two story house.       Resource/Environmental Concerns    Resource/Environmental Concerns none    Transportation Concerns none       Transition Planning    Patient/Family Anticipates Transition to inpatient rehabilitation facility    Patient/Family Anticipated Services at Transition none    Transportation Anticipated family or friend will provide;health plan transportation       Discharge Needs Assessment    Readmission Within the Last 30 Days no previous admission in last 30 days    Equipment Currently Used at Home bath bench;grab bar    Concerns to be Addressed substance/tobacco abuse/use;basic needs    Anticipated Changes Related to Illness none    Equipment Needed After Discharge bath bench;grab bar, tub/shower    Outpatient/Agency/Support Group Needs skilled nursing facility                   Discharge Plan       Row Name 09/18/24 1433       Plan    Plan CCP will follow pt for discharge needs based on clinical course. ZACHARY Castañeda RN    Patient/Family in Agreement with Plan yes    Plan Comments FACE SHEET VERIFIED/ IM LETTER SIGNED.  Spoke with pt at bedside.  Pt's  PCP is ADARSH Morillo.  Pt lives alone in a two story house. Pt is independent with ADLs.  Pt has a bath bench and grab bar for home use if needed. Pt gets his prescriptions at [facility] ( [address]).  Pt denies any issues affording medications. Pt is not current with HH.  Pt has not been in SNF.  CCP will follow pt for discharge needs based on clinical course.  ZACHARY Castañeda RN                  Continued Care and Services - Admitted Since 9/17/2024    No active coordination exists for this encounter.          Demographic Summary       Row Name 09/18/24 1431       General Information    Admission Type inpatient    Arrived From emergency department    Required Notices Provided Important Message from Medicare    Referral Source admission list    Reason for Consult discharge planning    Preferred Language English                   Functional Status       Row Name 09/18/24 1431       Functional Status    Usual Activity Tolerance moderate    Current Activity Tolerance fair       Functional Status, IADL    Medications independent    Meal Preparation independent    Housekeeping independent    Laundry independent    Shopping independent       Mental Status    General Appearance WDL WDL                   Psychosocial    No documentation.                  Abuse/Neglect    No documentation.                  Legal    No documentation.                  Substance Abuse    No documentation.                  Patient Forms    No documentation.                     Tara Castañeda, RN

## 2024-09-18 NOTE — NURSING NOTE
Pt passed nursing bss however reports some difficulty swallowing in recent history. Pt able to tolerate taking meds whole with small sips of water but may need further eval for whole meals with challenging textures.

## 2024-09-18 NOTE — CASE MANAGEMENT/SOCIAL WORK
Continued Stay Note  [facility]     Patient Name: Dixon Montoya  MRN: [id number]  Today's Date: 9/18/2024    Admit Date: 9/17/2024    Plan: CCP will follow pt for discharge needs based on clinical course. ZACHARY Castañeda RN   Discharge Plan       Row Name 09/18/24 1433       Plan    Plan CCP will follow pt for discharge needs based on clinical course. ZACHARY Castañeda RN    Patient/Family in Agreement with Plan yes    Plan Comments FACE SHEET VERIFIED/ IM LETTER SIGNED.  Spoke with pt at bedside.  Pt's PCP is ADARSH Morillo.  Pt lives alone in a two story house. Pt is independent with ADLs.  Pt has a bath bench and grab bar for home use if needed. Pt gets his prescriptions at [facility] ( [facility]).  Pt denies any issues affording medications. Pt is not current with .  Pt has not been in SNF.  CCP will follow pt for discharge needs based on clinical course.  ZACHARY Castañeda RN                   Discharge Codes    No documentation.                       Tara Castañeda RN

## 2024-09-19 NOTE — PROGRESS NOTES
LOS: 1 day   Patient Care Team:  Jaquelin Rueda APRN as PCP - General (Nurse Practitioner)    Chief Complaint:  Following for cardiomyopathy, troponin elevation    Interval History:   Intubated today for worsening hypoxia and encephalopathy.  Remains on pressors.    Objective   Vital Signs  Temp:  [96.9 °F (36.1 °C)-98.2 °F (36.8 °C)] 98.2 °F (36.8 °C)  Heart Rate:  [] 133  Resp:  [18-20] 18  BP: (101-131)/(51-92) 130/76    Intake/Output Summary (Last 24 hours) at 9/19/2024 0744  Last data filed at 9/19/2024 0433  Gross per 24 hour   Intake 3581.95 ml   Output 1380 ml   Net 2201.95 ml       Comfortable NAD, resting in bed on ventilator, sedated  PERRL, conjunctivae clear  Neck supple, no JVD or thyromegaly appreciated  S1/S2 tachycardic, regular, no m/r/g  Scattered, coarse respiratory sounds on ventilator.  Abdomen S/NT/ND (+) BS, no HSM appreciated  Extremities warm, no clubbing, cyanosis, no significant lower extremity edema, mottling noted to bilateral lower extremities  No visible or palpable skin lesions  Intubated and sedated, does not follow commands    Results Review:      Results from last 7 days   Lab Units 09/19/24  0534 09/19/24  0056 09/18/24  1849   SODIUM mmol/L 141  141 142 139   POTASSIUM mmol/L 3.6  3.6 3.5 3.2*   CHLORIDE mmol/L 109*  109* 109* 104   CO2 mmol/L 8.0*  8.0* 9.5* 11.0*   BUN mg/dL 119*  119* 122* 124*   CREATININE mg/dL 5.59*  5.59* 6.30* 6.49*   GLUCOSE mg/dL 79  79 83 86   CALCIUM mg/dL 7.4*  7.4* 7.3* 7.1*     Results from last 7 days   Lab Units 09/19/24  0534 09/18/24  0609 09/18/24  0300 09/18/24  0023   CK TOTAL U/L 681* 692*  --   --    HSTROP T ng/L  --   --  600* 665*     Results from last 7 days   Lab Units 09/19/24  0534 09/19/24  0056 09/18/24  1849 09/18/24  1511 09/18/24  1202 09/18/24  0609   WBC 10*3/mm3 20.92*  --   --   --  22.71* 22.43*   HEMOGLOBIN g/dL 7.9* 7.7* 8.1*   < > 8.1* 6.4*   HEMATOCRIT % 24.0* 22.2* 23.4*   < > 24.4* 19.1*    PLATELETS 10*3/mm3 128*  --   --   --  139* 124*    < > = values in this interval not displayed.     Results from last 7 days   Lab Units 09/18/24  1511   INR  1.26*         Results from last 7 days   Lab Units 09/19/24  0534   MAGNESIUM mg/dL 2.0           I reviewed the patient's new clinical results.  I personally viewed and interpreted the patient's EKG/Telemetry data        I directly reviewed and interpreted this patient's echocardiogram from September 18.  The RV does appear hyperdynamic and I do not appreciate any significant findings of CHF.  Definity imaging does show to suggest a thinned out apex which suggest remote nature of the dysfunction.  No evidence of LV thrombus.    Medication Review:   cefTRIAXone, 2,000 mg, Intravenous, Q24H  citalopram, 20 mg, Oral, Daily  folic acid, 1 mg, Oral, Daily  ipratropium-albuterol, 3 mL, Nebulization, 4x Daily - RT  levothyroxine, 25 mcg, Oral, Q AM  LORazepam, 1 mg, Oral, Q6H   Followed by  [START ON 9/20/2024] LORazepam, 1 mg, Oral, Q12H   Followed by  [START ON 9/21/2024] LORazepam, 1 mg, Oral, Daily  metroNIDAZOLE, 500 mg, Intravenous, Q8H  sodium chloride, 10 mL, Intravenous, Q12H  thiamine (B-1) IV, 200 mg, Intravenous, Q8H   Followed by  [START ON 9/23/2024] thiamine, 100 mg, Oral, Daily  vitamin D, 50,000 Units, Oral, Q7 Days        lactated ringers, 150 mL/hr, Last Rate: 150 mL/hr (09/19/24 0321)        Assessment & Plan       Sepsis    SHANA (acute kidney injury)    Hypokalemia    CAD (coronary artery disease)    Alcohol use disorder    Macrocytic anemia    Troponin elevation.  Unclear etiology.  Cardiomyopathy, ischemic, EF 36 to 40%  Troponins flat at 665, 600  Likely multifactorial, related to anemia, acidosis, SHANA less likely ACS given downward trend.  Unclear if type II NSTEMI, versus nonischemic myocardial injury.  Awaiting records to determine chronicity of wall motion abnormalities on TTE  Will need to consider aspirin, but holding for now given  his tenuous status  Left bundle branch block.  Chronicity unknown.  Awaiting records.  Anemia.  Workup ongoing.  Acute respiratory failure requiring intubation, multiple large mucous plugs suctioned from upper airway.  Pulmonology following.  SHANA.  Nephrology following.  Significant anion gap metabolic acidosis.  Trending labs, starting CRRT  Sepsis with suspected bacterial pneumonia.  On antibiotics.  Alcohol abuse, complicating all the above.  Peripheral vascular disease.  Absent pulses to bilateral lower extremities on my exam.  ABIs pending.  Anion gap metabolic acidosis.  Possible starvation ketosis.  Shock.  Increasing pressor requirement.  Unclear related to acidosis versus septic.  Workup ongoing.  May need pressor rotation to norepinephrine.    Unfortunately, not much to offer from the cardiac standpoint for him at this time.  He has multiorgan failure with respiratory failure, SHANA now going on CRRT, and a probable pre-existing cardiomyopathy.  If he requires increasing pressor requirements, consider norepinephrine, no barriers to this pressor for the cardiac standpoint.        Jasper Nguyen MD  09/19/24  07:44 EDT      Part of this note may be an electronic transcription/translation of spoken language to printed text using the Dragon Dictation System.

## 2024-09-19 NOTE — PLAN OF CARE
Goal Outcome Evaluation:  Plan of Care Reviewed With: patient           Outcome Evaluation: Patient intubated. Will sign off.

## 2024-09-19 NOTE — NURSING NOTE
Patient's cardiac medical records can be obtained from [facility] in [facility]. Phone number is [phone].    Information provided by patient's daughter.

## 2024-09-19 NOTE — CASE MANAGEMENT/SOCIAL WORK
Continued Stay Note  Psychiatric     Patient Name: Dixon Montoya  MRN: [id number]  Today's Date: 9/19/2024    Admit Date: 9/17/2024    Plan: Pending pt's hospital course & progression: intubated   Discharge Plan       Row Name 09/19/24 1629       Plan    Plan Pending pt's hospital course & progression: intubated    Patient/Family in Agreement with Plan yes    Plan Comments Pt discussed in multidisciplinary rounds. Clinicals reviewed. Pt not yet medically ready for DC, but CCP continues to follow. DC plan pending pt's progress during hospitalization. DC barriers: intubated, cortrak, jacinto-synephrine gtt, propofol gtt, IV sodium bicarb, CRRT, & restraints           Yamilex Solorio RN

## 2024-09-19 NOTE — CONSULTS
Orthopaedic Consultation      Patient: Dixon Montoya    Date of Admission: 9/17/2024 11:44 PM    YOB: 1955    Medical Record Number: [id number]    Attending Physician:  Ricki Flood MD    Consulting Physician:  Romain Sanchez MD        Chief Complaints: Left olecranon fracture    History of Present Illness: 68-year-old male in the ICU secondary to medical comorbidities.  Patient intubated and sedated.  Patient apparently fell couple of weeks ago resulting in olecranon fracture.  This was noted on x-rays.  Orthopedics consulted for recommendations.      Allergies: No Known Allergies    Medications:   Home Medications:  Medications Prior to Admission   Medication Sig Dispense Refill Last Dose    lisinopril (PRINIVIL,ZESTRIL) 30 MG tablet Take 1 tablet by mouth Daily.       loratadine (CLARITIN) 10 MG tablet Take 1 tablet by mouth Daily.       metoprolol succinate XL (TOPROL-XL) 25 MG 24 hr tablet Take 1 tablet by mouth Daily.       potassium chloride (KLOR-CON M10) 10 MEQ CR tablet Take 1 tablet by mouth.       rosuvastatin (CRESTOR) 40 MG tablet Take 1 tablet by mouth Daily.       aspirin 81 MG EC tablet Take 1 tablet by mouth Daily.       citalopram (CeleXA) 40 MG tablet Take 1 tablet by mouth Daily.       folic acid (FOLVITE) 1 MG tablet Take 1 tablet by mouth Daily.          Current Medications:  Scheduled Meds:cefepime, 2,000 mg, Intravenous, Once  cefepime, 2,000 mg, Intravenous, Q8H  chlorhexidine, 15 mL, Mouth/Throat, Q12H  [START ON 9/20/2024] citalopram, 20 mg, Nasogastric, Daily  [START ON 9/20/2024] folic acid, 1 mg, Nasogastric, Daily  heparin (porcine), 5,000 Units, Subcutaneous, Q8H  ipratropium-albuterol, 3 mL, Nebulization, 4x Daily - RT  [START ON 9/20/2024] levothyroxine, 25 mcg, Nasogastric, Q AM  LORazepam, 1 mg, Intravenous, Q6H   Followed by  [START ON 9/20/2024] LORazepam, 1 mg, Intravenous, Q12H   Followed by  [START ON 9/21/2024] LORazepam, 1 mg, Intravenous,  Daily  metroNIDAZOLE, 500 mg, Intravenous, Q8H  pantoprazole, 40 mg, Intravenous, Q24H  sodium chloride, 10 mL, Intravenous, Q12H  thiamine (B-1) IV, 200 mg, Intravenous, Q8H   Followed by  [START ON 9/23/2024] thiamine, 100 mg, Oral, Daily  vancomycin, 750 mg, Intravenous, Q12H  [START ON 9/25/2024] vitamin D, 50,000 Units, Nasogastric, Q7 Days      Continuous Infusions:dexmedetomidine, 0.2-1.5 mcg/kg/hr, Last Rate: 0.6 mcg/kg/hr (09/19/24 1635)  Pharmacy to dose vancomycin,   phenylephrine, 0.5-3 mcg/kg/min, Last Rate: 1.2 mcg/kg/min (09/19/24 1440)  Phoxillum BK4/2.5, 1,500 mL/hr, Last Rate: 1,500 mL/hr (09/19/24 1440)  Phoxillum BK4/2.5, 1,500 mL/hr, Last Rate: 1,500 mL/hr (09/19/24 1440)  Phoxillum BK4/2.5, 1,500 mL/hr, Last Rate: 1,500 mL/hr (09/19/24 1600)  propofol, 5-50 mcg/kg/min, Last Rate: 35 mcg/kg/min (09/19/24 1502)  sodium bicarbonate, 150 mEq, Last Rate: 150 mEq (09/19/24 1345)  vasopressin, 0.03 Units/min      PRN Meds:.  acetaminophen **OR** acetaminophen **OR** acetaminophen    albuterol    aluminum-magnesium hydroxide-simethicone    senna-docusate sodium **AND** polyethylene glycol **AND** bisacodyl **AND** bisacodyl    Calcium Replacement - Follow Nurse / BPA Driven Protocol    fentaNYL citrate (PF)    heparin (porcine)    heparin    LORazepam **OR** LORazepam **OR** LORazepam **OR** LORazepam **OR** LORazepam **OR** LORazepam    Magnesium Standard Dose Replacement - Follow Nurse / BPA Driven Protocol    ondansetron ODT **OR** ondansetron    Pharmacy to dose vancomycin    Phosphorus Replacement - Follow Nurse / BPA Driven Protocol    Potassium Replacement - Follow Nurse / BPA Driven Protocol    sodium chloride    vasopressin    Past Medical History:   Diagnosis Date    Anxiety     Depression     Hyperlipidemia     Hypertension      History reviewed. No pertinent surgical history.  Social History     Occupational History    Not on file   Tobacco Use    Smoking status: Every Day     Current  packs/day: 1.00     Types: Cigarettes    Smokeless tobacco: Never   Vaping Use    Vaping status: Never Used   Substance and Sexual Activity    Alcohol use: Yes     Alcohol/week: 4.0 standard drinks of alcohol     Types: 4 Shots of liquor per week    Drug use: Yes     Types: Marijuana    Sexual activity: Defer      Social History     Social History Narrative    Not on file     History reviewed. No pertinent family history.      Review of Systems:   Unable to obtain due to intubation.    Vital signs in last 24 hours:  Temp:  [97.5 °F (36.4 °C)-98.6 °F (37 °C)] 98.6 °F (37 °C)  Heart Rate:  [] 81  Resp:  [17-34] 17  BP: ()/(53-92) 101/65  FiO2 (%):  [59 %-100 %] 98 %  Vitals:    09/19/24 1530 09/19/24 1545 09/19/24 1600 09/19/24 1630   BP: 103/66 100/63 101/63 101/65   Pulse: 83 85 83 81   Resp:       Temp:       TempSrc:       SpO2: 100% 100% 100% 100%   Weight:       Height:              Physical Exam: 68 y.o. male         General Appearance: Sedated  HEENT:    Atraumatic, intubated   Neck:   Cervical spine midline, no appreciable JVD   Lungs:     Breathing non-labored and chest rise symmetric    Heart:   Abdomen:     Rectal:       Extremities:   Pulses  Neurovascular:   Skin:   Musculoskeletal:      Pulse regular    Soft, Non-tender or distended    Deferred        No clubbing, cyanosis, or edema    Intact    Cranial nerves 2 - 12 grossly intact, sensation intact    No skin lesions  Examination of the left elbow reveals some mild swelling over the olecranon region.  Hand warm and perfused.  Compartments are soft.  No open wounds.     Diagnostic Tests:    Results from last 7 days   Lab Units 09/19/24  1048 09/19/24  0534   WBC 10*3/mm3  --  20.92*   HEMOGLOBIN g/dL  --  7.9*   HEMATOCRIT %  --  24.0*   PLATELETS 10*3/mm3 162 128*     Results from last 7 days   Lab Units 09/19/24  1048   SODIUM mmol/L 147*   POTASSIUM mmol/L 3.8   CHLORIDE mmol/L 109*   CO2 mmol/L 11.0*   BUN mg/dL 123*   CREATININE mg/dL  5.87*   GLUCOSE mg/dL 112*   CALCIUM mg/dL 7.3*     Results from last 7 days   Lab Units 09/18/24  1511   INR  1.26*     XR Chest 1 View    Result Date: 9/19/2024  As described.  This report was finalized on 9/19/2024 1:12 PM by Dr. Michael Dunaway M.D on Workstation: [id number]      XR Chest 1 View    Result Date: 9/19/2024  FINDINGS AND IMPRESSION: Endotracheal tube tip terminates approximately 7.9 cm above the ayana.  There is improved expansion since 9/19/2024 at 6:36 a.m. There is persistent extensive pulmonary pacification throughout the bilateral lungs. Cardiac silhouette is within normal limits for size. No pneumothorax seen  This report was finalized on 9/19/2024 7:52 AM by Dr. Mark De Santiago M.D on Workstation: [id number]      XR Chest 1 View    Result Date: 9/19/2024  FINDINGS AND IMPRESSION: There is extensive pulmonary opacification throughout the bilateral lungs which is essentially new since 9/18/2024 which given the rapid onset is concerning for florid pulmonary edema, acute respiratory distress syndrome and/or multifocal pneumonia in the appropriate clinical context and correlation with patient history is recommended with follow-up chest CT if clinically indicated. No pneumothorax is seen. Cardiac silhouette is accentuated by patient rotation.  This report was finalized on 9/19/2024 7:06 AM by Dr. Mark De Santiago M.D on Workstation: [id number]      US Renal Bilateral    Result Date: 9/18/2024  1. Exophytic 3.2 cm hypoechoic and possibly solid left renal mass. This could be neoplastic. Further evaluation recommended.   This report was finalized on 9/18/2024 4:08 PM by Dr. Mil Eubanks M.D on Workstation: [id number]      XR ELBOW 2 VIEW LEFT    Addendum Date: 9/18/2024    ADDENDUM: Patient: KATHI CASTELLANOS  Time Out: 02:25 Exam(s): XR LEFT ELBOW  Added by Chaparro Prince MD on 9 18 2024 2:25 AM (-04:00) THE MEASUREMENT OF DISPLACEMENT WAS UPDATED: EXAM:   XR Left Elbow Complete, 3 or More Views  CLINICAL HISTORY:    Reason for exam: fall, elbow pain. TECHNIQUE:   Frontal, lateral and oblique views of the left elbow. COMPARISON:   No relevant prior studies available. FINDINGS:   Bones joints:  Displaced, mildly comminuted fracture of the olecranon, which is displaced by proximally 1.5 cm.  No dislocation.   Soft tissues:  Unremarkable. IMPRESSION:       Displaced, mildly comminuted fracture of the olecranon, which is displaced by proximally 1.5 cm.     Result Date: 9/18/2024  Electronically signed by Chaparro Prince MD on 09-18-24 at 0224    XR Chest 1 View    Result Date: 9/18/2024  Electronically signed by Chaparro Prince MD on 09-18-24 at 0159       Assessment:    Sepsis    SHANA (acute kidney injury)    Hypokalemia    CAD (coronary artery disease)    Alcohol use disorder    Macrocytic anemia  Left mildly displaced olecranon fracture.      Plan: We have placed the patient into a splint to protect the left elbow olecranon fracture.  Currently he is to sick for any type of surgical intervention.  This may be addressed at a later time if medically optimal.  The splint may remain on until follow-up in 2 weeks.  Please call with any questions.      Date: 9/19/2024  Romain Sanchez MD

## 2024-09-19 NOTE — PROCEDURES
Endotracheal Intubation Procedure Note    Indication for endotracheal intubation: airway compromise and respiratory failure.  Sedation:  Etomidate 10 mg on propofol 4 cc .  Equipment: A video GlideScope was used and Carl 4 laryngoscope blade.  Number of attempts: 2.  ETT location confirmed by by auscultation and ETCO2 monitor.  Good color change on the CO2 detector  Bilateral breath sounds  Chest x-ray pending  ET tube secured 26 cm at the lip  Multiple large mucous plugs suctioned from the upper airway.    Lyssa Abdullahi MD  9/19/2024

## 2024-09-19 NOTE — CONSULTS
"Nutrition Services    Patient Name:  Dixon Montoya  YOB: 1955  MRN: [id number]  Admit Date:  9/17/2024    Assessment Date:  09/19/24    Summary: Consult for Cortrak Placement and TF Assessment   Pt is a 67 y/o male who presented with generalized weakness. Pt has a hx of HLD, HTN, ETOH abusem COPD, CAD, hepatic steatosis.  RD and RN placed cortrak gastric to depth of 75 cm and secured with a nasal septal tie. Pt currently intubated and sedated on precedex and propofol at 11.3 ml/hr providing 298 kcals/day. Currently on 1 pressor. Currently on CRRT.  RD unsure of weight hx due to limited weights in EMR.   Na 147, Phos 9.0    RECS:  Please initiate Peptamen Intense VHP at 10 ml/hr and advance 10 ml q 4 hrs as tolerated while monitoring for refeeding syndrome. Goal rate of 70 ml/hr will provide 1540 (1838 kcals while on propofol at current rate), 142 g/protein, and 1294 ml free water. Please flush 1 packet of prosource liquid protein to provide an additional 60 kcals and 15 g/protein. Please Flush 50 ml free water q 1 hr.     The above end goal rate is for 22 hrs/day to assume interruptions for ADLs. Water flushes adjusted based on clinical picture + Rx flushes/IV fluids     RD to follow     CLINICAL NUTRITION ASSESSMENT      Reason for Assessment Cortrak Placement, Physician Consult, TF Assessment     Diagnosis/Problem   generalized weakness. Pt has a hx of HLD, HTN, ETOH abusem COPD, CAD, hepatic steatosis.   Medical/Surgical History Past Medical History:   Diagnosis Date    Anxiety     Depression     Hyperlipidemia     Hypertension        History reviewed. No pertinent surgical history.     Anthropometrics        Current Height  Current Weight  BMI kg/m2 Height: 172.7 cm (67.99\")  Weight: 75.2 kg (165 lb 12.6 oz) (09/19/24 0400)  Body mass index is 25.21 kg/m².   Adjusted BMI (if applicable)    BMI Category Overweight (25 - 29.9)   Ideal Body Weight (IBW) 150 lbs   Usual Body Weight (UBW) 170 lbs "   Weight Trend Stable   Weight History Wt Readings from Last 30 Encounters:   09/19/24 0400 75.2 kg (165 lb 12.6 oz)   09/18/24 0952 78.5 kg (173 lb)   09/17/24 2341 78.5 kg (173 lb)        Estimated/Assessed Needs        Energy Requirements    Weight for Calculation 75 kg   Method for Estimation  kcal/kg, other:20-25   EST Needs (kcal/day) 1934-4498       Protein Requirements    Weight for Calculation 75 kg   EST Protein Needs (g/kg) gm/kg, other:2-2.5   EST Daily Needs (g/day) 150-187       Fluid Requirements     Method for Estimation 1 mL/kcal    Estimated Needs (mL/day)        Fluid Deficit    Current Na Level (mEq/L)    Desired Na Level (mEq/L)    Estimated Fluid Deficit (L)       Labs       Pertinent Labs    Results from last 7 days   Lab Units 09/19/24  1048 09/19/24  0534 09/19/24  0056 09/18/24  1202 09/18/24  0609 09/18/24  0023   SODIUM mmol/L 147* 141  141 142   < > 138 138   POTASSIUM mmol/L 3.8 3.6  3.6 3.5   < > 2.6* 2.3*   CHLORIDE mmol/L 109* 109*  109* 109*   < > 100 96*   CO2 mmol/L 11.0* 8.0*  8.0* 9.5*   < > 11.0* 11.5*   BUN mg/dL 123* 119*  119* 122*   < > 117* 129*   CREATININE mg/dL 5.87* 5.59*  5.59* 6.30*   < > 6.82* 7.08*   CALCIUM mg/dL 7.3* 7.4*  7.4* 7.3*   < > 6.5* 7.3*   BILIRUBIN mg/dL  --  0.4  --   --  0.6 0.7   ALK PHOS U/L  --  106  --   --  98 105   ALT (SGPT) U/L  --  15  --   --  16 19   AST (SGOT) U/L  --  28  --   --  27 31   GLUCOSE mg/dL 112* 79  79 83   < > 99 149*    < > = values in this interval not displayed.     Results from last 7 days   Lab Units 09/19/24  1048 09/19/24  0534 09/18/24  1202 09/18/24  0609   MAGNESIUM mg/dL 2.0 2.0  --  2.2   PHOSPHORUS mg/dL 9.0* 7.4*   < > 8.5*   HEMOGLOBIN g/dL  --  7.9*   < > 6.4*   HEMATOCRIT %  --  24.0*   < > 19.1*   WBC 10*3/mm3  --  20.92*   < > 22.43*   ALBUMIN g/dL 2.5* 2.6*  2.6*   < > 2.6*    < > = values in this interval not displayed.     Results from last 7 days   Lab Units 09/19/24  1048 09/19/24  0534  09/18/24  1511 09/18/24  1202 09/18/24  0609 09/18/24  0023   INR   --   --  1.26*  --   --   --    PLATELETS 10*3/mm3 162 128*  --  139* 124* 137*     COVID19   Date Value Ref Range Status   09/18/2024 Not Detected Not Detected - Ref. Range Final     Lab Results   Component Value Date    HGBA1C 4.9 07/16/2024          Medications           Scheduled Medications cefepime, 2,000 mg, Intravenous, Once  cefepime, 2,000 mg, Intravenous, Q24H  chlorhexidine, 15 mL, Mouth/Throat, Q12H  [START ON 9/20/2024] citalopram, 20 mg, Nasogastric, Daily  [START ON 9/20/2024] folic acid, 1 mg, Nasogastric, Daily  heparin (porcine), 5,000 Units, Subcutaneous, Q8H  ipratropium-albuterol, 3 mL, Nebulization, 4x Daily - RT  [START ON 9/20/2024] levothyroxine, 25 mcg, Nasogastric, Q AM  LORazepam, 1 mg, Intravenous, Q6H   Followed by  [START ON 9/20/2024] LORazepam, 1 mg, Intravenous, Q12H   Followed by  [START ON 9/21/2024] LORazepam, 1 mg, Intravenous, Daily  metroNIDAZOLE, 500 mg, Intravenous, Q8H  pantoprazole, 40 mg, Intravenous, Q24H  sodium chloride, 10 mL, Intravenous, Q12H  thiamine (B-1) IV, 200 mg, Intravenous, Q8H   Followed by  [START ON 9/23/2024] thiamine, 100 mg, Oral, Daily  vancomycin, 750 mg, Intravenous, Q12H  vancomycin, 1,500 mg, Intravenous, Once  [START ON 9/25/2024] vitamin D, 50,000 Units, Nasogastric, Q7 Days       Infusions dexmedetomidine, 0.2-1.5 mcg/kg/hr, Last Rate: 0.2 mcg/kg/hr (09/19/24 0900)  Pharmacy to dose vancomycin,   phenylephrine, 0.5-3 mcg/kg/min, Last Rate: 0.5 mcg/kg/min (09/19/24 0906)  Phoxillum BK4/2.5, 1,500 mL/hr  Phoxillum BK4/2.5, 1,500 mL/hr  Phoxillum BK4/2.5, 1,500 mL/hr  propofol, 5-50 mcg/kg/min, Last Rate: 5 mcg/kg/min (09/19/24 0805)  sodium bicarbonate, 150 mEq, Last Rate: 150 mEq (09/19/24 0911)       PRN Medications   acetaminophen **OR** acetaminophen **OR** acetaminophen    albuterol    aluminum-magnesium hydroxide-simethicone    senna-docusate sodium **AND** polyethylene  glycol **AND** bisacodyl **AND** bisacodyl    Calcium Replacement - Follow Nurse / BPA Driven Protocol    fentaNYL citrate (PF)    heparin (porcine)    LORazepam **OR** LORazepam **OR** LORazepam **OR** LORazepam **OR** LORazepam **OR** LORazepam    Magnesium Standard Dose Replacement - Follow Nurse / BPA Driven Protocol    ondansetron ODT **OR** ondansetron    Pharmacy to dose vancomycin    Phosphorus Replacement - Follow Nurse / BPA Driven Protocol    Potassium Replacement - Follow Nurse / BPA Driven Protocol     Physical Findings          General Findings ventilator support   Oral/Mouth Cavity dental caries   Edema  1+ (trace)   Gastrointestinal WDL   Skin  skin intact   Tubes/Drains/Lines Cortrak, dialysis catheter, NG tube, bridle in place   NFPE Not indicated at this time     Current Nutrition Orders & Evaluation of Intake       Oral Nutrition     Food Allergies NKFA   Current PO Diet NPO Diet NPO Type: Strict NPO, Sips with Meds   Supplement n/a   PO Evaluation     % PO Intake NPO    Factors Affecting Intake: Other: Intubated     PES STATEMENT / NUTRITION DIAGNOSIS      Nutrition Dx Problem  Problem: Needs Alternative Composition  Etiology: Medical Diagnosis - generalized weakness. Pt has a hx of HLD, HTN, ETOH abusem COPD, CAD, hepatic steatosis.    Signs/Symptoms: Report/Observation   --  NUTRITION INTERVENTION / PLAN OF CARE      Intervention Goal(s) Maintain nutrition status, Establish goals of care, Meet estimated needs, Initiate TF/PN, and Tolerate TF/PN at goal         RD Intervention/Action Await initiation of EN/PN, Continue to monitor, Care plan reviewed, and Recommend/order: EN         Prescription/Orders:       PO Diet       Supplements       Enteral Nutrition    Enteral Prescription:     Enteral Route NG    TF Delivery Method Continuous    Enteral Product Peptamen Intense VHP    Modular None    Propofol Rate/Kcal 11.3 ml/hr 248 kcals    TF Start Rate  10 mL/hr    TF Goal Rate  70 mL/hr    Free  Water Flush 50 mL Q 1 hr    Provision at Goal:          Calories 1540 kcal (1898 while on propofol + prosource), meets 100% needs         Protein  157 gm protein, meets 100% needs         Fluid (mL) 1294 mL free water + 1200 mL in flushes         Parenteral Nutrition    New Prescription Ordered? Yes   --      Monitor/Evaluation Per protocol, Pertinent labs, EN delivery/tolerance, Weight, Skin status, GI status, Symptoms, POC/GOC, Swallow function   Discharge Plan/Needs No discharge needs identified at this time     RD to follow per protocol.      Electronically signed by:  Mayo Pérez RDN, LD  09/19/24 11:50 EDT

## 2024-09-19 NOTE — PROGRESS NOTES
[facility] Progress Note      Patient Name: Dixon Montoya  : 1955  MRN: [id number]  Primary Care Physician:  Jaquelin Rueda, ADARSH  Date of admission: 2024    Subjective     Interval History:   Follow-up acute kidney injury, hypokalemia.  Decompensated earlier this morning.  Increasing in requirements, severe metabolic anion gap and respiratory acidemia.  Required intubation.  IV fluids turned off.  Now hypotensive.  Lower body mottling.    Review of Systems:   As noted above    Objective     Vitals:   Temp:  [96.9 °F (36.1 °C)-98.2 °F (36.8 °C)] 98.2 °F (36.8 °C)  Heart Rate:  [] 128  Resp:  [18-20] 18  BP: ()/(51-92) 102/66  Flow (L/min):  [2-10] 10    Intake/Output Summary (Last 24 hours) at 2024 0841  Last data filed at 2024 0800  Gross per 24 hour   Intake 3917.95 ml   Output 1530 ml   Net 2387.95 ml       Physical Exam:    General Appearance: Chronically ill now acutely ill.  On ventilator.  Does not respond to voice or follow commands.  Skin: warm and dry  HEENT: oral ET tube., nonicteric sclera  Neck: supple, no JVD  Lungs: Coarse breath sounds bilaterally.  Heart: RRR, tachycardic.  No S3 or rub.  Abdomen: soft, nontender, distended.  Mottling of the abdominal wall.  Few bowel sounds  : External catheter  Extremities: Lower extremity mottling feet violaceous toes.  Neuro: On ventilator.  Does not follow commands or respond to voice.    Scheduled Meds:     cefTRIAXone, 2,000 mg, Intravenous, Q24H  citalopram, 20 mg, Oral, Daily  folic acid, 1 mg, Oral, Daily  ipratropium-albuterol, 3 mL, Nebulization, 4x Daily - RT  levothyroxine, 25 mcg, Oral, Q AM  LORazepam, 1 mg, Intravenous, Q6H   Followed by  [START ON 2024] LORazepam, 1 mg, Intravenous, Q12H   Followed by  [START ON 2024] LORazepam, 1 mg, Intravenous, Daily  metroNIDAZOLE, 500 mg, Intravenous, Q8H  potassium chloride, 10 mEq, Intravenous, Q1H  sodium bicarbonate, 100 mEq,  Intravenous, Once  sodium chloride, 10 mL, Intravenous, Q12H  thiamine (B-1) IV, 200 mg, Intravenous, Q8H   Followed by  [START ON 9/23/2024] thiamine, 100 mg, Oral, Daily  vitamin D, 50,000 Units, Oral, Q7 Days      IV Meds:   lactated ringers, 150 mL/hr, Last Rate: Stopped (09/19/24 0700)  propofol, 5-50 mcg/kg/min, Last Rate: 5 mcg/kg/min (09/19/24 0805)        Results Reviewed:   I have personally reviewed the results from the time of this admission to 9/19/2024 08:41 EDT     Results from last 7 days   Lab Units 09/19/24  0534 09/19/24  0056 09/18/24  1849 09/18/24  1202 09/18/24  0609 09/18/24  0023   SODIUM mmol/L 141  141 142 139   < > 138 138   POTASSIUM mmol/L 3.6  3.6 3.5 3.2*   < > 2.6* 2.3*   CHLORIDE mmol/L 109*  109* 109* 104   < > 100 96*   CO2 mmol/L 8.0*  8.0* 9.5* 11.0*   < > 11.0* 11.5*   BUN mg/dL 119*  119* 122* 124*   < > 117* 129*   CREATININE mg/dL 5.59*  5.59* 6.30* 6.49*   < > 6.82* 7.08*   CALCIUM mg/dL 7.4*  7.4* 7.3* 7.1*   < > 6.5* 7.3*   BILIRUBIN mg/dL 0.4  --   --   --  0.6 0.7   ALK PHOS U/L 106  --   --   --  98 105   ALT (SGPT) U/L 15  --   --   --  16 19   AST (SGOT) U/L 28  --   --   --  27 31   GLUCOSE mg/dL 79  79 83 86   < > 99 149*    < > = values in this interval not displayed.       Estimated Creatinine Clearance: 13.6 mL/min (A) (by C-G formula based on SCr of 5.59 mg/dL (H)).    Results from last 7 days   Lab Units 09/19/24  0534 09/19/24  0056 09/18/24  1849 09/18/24  1511 09/18/24  0609 09/18/24  0023   MAGNESIUM mg/dL 2.0  --   --   --  2.2 1.6   PHOSPHORUS mg/dL 7.4* 7.3* 7.6*   < > 8.5* 9.5*    < > = values in this interval not displayed.             Results from last 7 days   Lab Units 09/19/24  0534 09/19/24  0056 09/18/24 1849 09/18/24  1511 09/18/24  1202 09/18/24  0609 09/18/24  0023   WBC 10*3/mm3 20.92*  --   --   --  22.71* 22.43* 21.21*   HEMOGLOBIN g/dL 7.9* 7.7* 8.1* 8.0* 8.1* 6.4* 5.6*   PLATELETS 10*3/mm3 128*  --   --   --  139* 124* 137*        Results from last 7 days   Lab Units 09/18/24  1511   INR  1.26*       Assessment / Plan     ASSESSMENT:  Acute kidney injury, nonoliguric.  Differential broad including hypovolemia with inability to autoregulate due to ACE inhibitor, found anemia, possible element of rhabdomyolysis with elevated CK.  Urine with large blood and few red cells could be consistent with pigment.  He has a prerenal azotemia that could be due to some blood in the GI tract especially given his profound anemia.  He was getting aggressive IV fluid resuscitation however this morning decompensated (EF 35%) and is now severely acidemic and required intubation.  His chemistries yesterday looked consistent with an alcoholic or starvation ketosis.  His pH was compensated.    Significant anion gap metabolic acidemia.  He has skin mottling concerning for ongoing tissue ischemia.  I have given him some IV bicarbonate STAT.  He will need CRRT.  2.  Acute hypoxemic, hypercapnic respiratory failure.  Ventilator  3.  Hypotension, likely septic shock.  Aspiration pneumonia.  Cultures negative so far.  On Flagyl and ceftriaxone.  Mottling of his lower extremities and abdominal wall.  Will need to CT chest, abdomen, pelvis when more stable.  4.  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  5.  Hypothyroid on replacement.  6.  Peripheral arterial disease with poor perfusion on admission yesterday.  Feet were violaceous yesterday.  Now with significant mottling of his abdominal wall and lower extremities.  7.  Anemia, profound.  Has received 2 units of blood since admission.  Hemoglobin has risen from 5.6-7.9.  8.  Thrombocytopenia.  Had splenomegaly from cirrhosis with his history of alcohol abuse.  Check LDH and haptoglobin.  Do not really suspect TTP.  9. Alcohol abuse.   10. Left olecranon fracture.  PLAN:  2 Amps of bicarb IV STAT.  CRRT start ASAP.  Core track for meds.  May need to broaden antibiotic coverage.  5.  Carroll catheter for  very strict urine output measurement in order to manage drips and CRRT.  6. LDH and haptoglobin  7. He is critically ill and prognosis is poor at this point given his underlying debility.  Thank you for involving us in the care of Dixon Montoya.  Please feel free to call with any questions.    Layla Payne MD  09/19/24  08:41 EDT    [facility]  [phone]    Please note that portions of this note were completed with a voice recognition program.

## 2024-09-19 NOTE — PROCEDURES
Ultrasound Guided Central Venous Catheter Insertion Procedure Note      Indications:  dialysis    Procedure Details   emergent consent was obtained for the procedure, including sedation.  Risks of lung perforation, hemorrhage, arrhythmia, and adverse drug reaction were considered.     Maximum sterile technique was used including usual patient drapes, antiseptics and physician sterile garments.    Under sterile conditions the skin above the on the right internal jugular vein was prepped with chlorhexidine and covered with a sterile drape. A sterile ultrasound probe was used to localize the target vein. It was clearly visualized. Local anesthesia was applied to the skin and subcutaneous tissues with lidocaine 1%. An 18-gauge needle was then inserted into the vein under ultrasound guidance. A guide wire was then threaded into the vein. A 16cm shiley dialysis catheter with pigtail was then inserted into the vessel over the guide wire. The catheter was sutured into place and dressed following sterile protocol with Biopatch placed. All 3 ports were flushed and deemed patent.    Findings:  There were no changes to vital signs. All catheter ports were flushed with saline. Patient did tolerate procedure well.    Recommendations:  CXR ordered to verify placement.       Mike Lizama MD  9/19/2024

## 2024-09-19 NOTE — PROGRESS NOTES
"[facility] Clinical Pharmacy Services: Vancomycin Pharmacokinetic Initial Consult Note    Dixon Montoya is a 68 y.o. male who is on day 1 of pharmacy to dose vancomycin.    Indication: Empiric  Consulting Provider: Dr. Mike Lizama  Planned Duration of Therapy: 5 days  Loading Dose Ordered or Given: 1500 mg on 9/19 at 1030  MRSA PCR performed: ordered; Result: pending  Culture/Source:   - 9/18 urine culture pending   - 9/18 blood cultures pending   - 9/18 legionella, s.pneumo, RVP neg  Target: Dose by Levels  Pertinent Vanc Dosing History: none  Other Antimicrobials: cefepime    Vitals/Labs  Ht: 172.7 cm (68\"); Wt: 75.2 kg (165 lb 12.6 oz)  Temp Readings from Last 1 Encounters:   09/19/24 98.2 °F (36.8 °C) (Oral)    Estimated Creatinine Clearance: 13.6 mL/min (A) (by C-G formula based on SCr of 5.59 mg/dL (H)).  CRRT     Results from last 7 days   Lab Units 09/19/24  0534 09/19/24  0056 09/18/24  1849 09/18/24  1511 09/18/24  1202 09/18/24  0609   CREATININE mg/dL 5.59*  5.59* 6.30* 6.49*   < > 6.71* 6.82*   WBC 10*3/mm3 20.92*  --   --   --  22.71* 22.43*    < > = values in this interval not displayed.     Assessment/Plan:    Vancomycin Dose: 750 mg IV every 12 hours.  Pharmacy to dose by levels with CRRT.  Vanc Trough has been ordered for 9/20 at 1000.    Pharmacy will follow patient's kidney function and will adjust doses and obtain levels as necessary. Thank you for involving pharmacy in this patient's care. Please contact pharmacy with any questions or concerns.                           Edyta Gunderson, [facility]  Clinical Pharmacist  "

## 2024-09-19 NOTE — PLAN OF CARE
Goal Outcome Evaluation:      Pt remains in the ICU. Renal managing labs and IVF overnight. Ativan per YG. Pt acutely decompensated this AM, going from 6 L high flow nasal cannula to 15 L highflow nasal cannula whiles till maintaining O2 sats in the 70s. Pulmonology stat paged to bedside for RSI after stat abg, EKG, and chest x ray obtained; see results. IVF stopped d/t results at this time. Call out to renal this AM for diuresis.

## 2024-09-19 NOTE — PROGRESS NOTES
Pulmonary/Critical care    Notified by RN pt hypoxic with spO2 70s, increased to 15L HFNC and added NRB, then unable to  o2 sat  Immediately examined patient at bedside, tachycardic HR 130s, tachypneic and poorly responsive although did just receive ativan per CIWA protocol, unable to protect his airway, appears with some mottling  Stat CXR appears with worsening bilaterally likely acute pulm edema with underlying pneumonia  Stat ABG pH 7.051, pco2 33.5, pO2 50.7, HCO3 9.3  Note systolic HF  Stat EKG appears similar to prior with left bundle  Stopped IVF's  Patient needs to be intubated; Dr Abdullahi notified   Will page nephrology concerning orders for diuresis given     CC time: 35 minutes

## 2024-09-19 NOTE — PROGRESS NOTES
[facility]     Mar/chart reviewed  Follow up SHANA, spesis with pneumonia, alcohol abuse  Patient sedated on vent unable to provide subjective    Vital Sign Min/Max for last 24 hours  Temp  Min: 96.9 °F (36.1 °C)  Max: 98.2 °F (36.8 °C)   BP  Min: 97/63  Max: 131/78   Pulse  Min: 90  Max: 135   Resp  Min: 18  Max: 20   SpO2  Min: 85 %  Max: 100 %   Flow (L/min)  Min: 2  Max: 10   Weight  Min: 75.2 kg (165 lb 12.6 oz)  Max: 78.5 kg (173 lb)   3682/1380  Sedated on vent,   perrl, normal sclera  mmm, no jvd, trachea midline, neck supple,  chest coarse bilaterally, + crackles, no wheezes, +use of ae muscles  Tachy, regular  soft, nt, distended  no c/c/e -- dusky le  Skin warm, dry, no rashes, some mottling around abdomen,     Labs: 9/19: reviewed:  7.11/25/191  Glucose 79  Bun 119  Cr 5.59  Bicarb 8  9/19: CXR: extensive bilateral infiltrates worse rll    Cardiac echo: ef 36-40%  Renal u/a: 3.2cm solid left renal mass    A/P:  SHANA _- multifactorial -- nephrology following -- continue volume replacement, avoid nephrotoxins,   Sepsis with pneumonia -- continue antibiotics, check infectious work up  Alcohol abuse -- ciwa, thiamine  CAD -- cards following, elevated trop in setting of shana  Anemia  Hypothyroidism  Possible copd with ongoing tobacco abuse -- smoking cessation, outpt pft  pVD --agree with check raine  AGMA - starvation ketosis --severe going to need CCRT  Fracture of arm -- ortho conulted, not really a candidate for any operative intervention right now.     Will need placement of dialysis line, pressors, feeding tube, expand antibiotics, if stable enough ct imaging    D/w RN and with Dr. Payne d/w dr. Abdullahi earlier today    CC 45 mins excluding billable proceedures

## 2024-09-20 NOTE — PROGRESS NOTES
[facility]    Mar/chart reviewed  Follow up SHANA, spesis with pneumonia, alcohol abuse  Patient sedated on vent unable to provide subjective    Vital Sign Min/Max for last 24 hours  Temp  Min: 98.1 °F (36.7 °C)  Max: 99.7 °F (37.6 °C)   BP  Min: 79/53  Max: 120/65   Pulse  Min: 71  Max: 115   Resp  Min: 17  Max: 24   SpO2  Min: 92 %  Max: 100 %   No data recorded   Weight  Min: 80.9 kg (178 lb 5.6 oz)  Max: 80.9 kg (178 lb 5.6 oz)   4402/804    Sedated on vent,   perrl, normal sclera  mmm, no jvd, trachea midline, neck supple,  chest coarse bilaterally, + crackles, no wheezes, +use of ae muscles  Tachy, regular  soft, nt, distended  no c/c/e -- dusky le  Skin warm, dry, no rashes, some mottling around abdomen,     Labs: 9/20: reviewed:  Glucose 106  Bun 30  Cr 1.37  Bicarb 22.9  7.60/25/90  Wbc 14  Hgb 6.6  Plts 104  Urine >100K klebsiella    A/P:  SHANA _- multifactorial -- nephrology following -- continue volume replacement, avoid nephrotoxins,   Sepsis with pneumonia and UTI -- continue antibiotics, check infectious work up  Alcohol abuse -- ciwa, thiamine  CAD -- cards following, elevated trop in setting of shana  Anemia  Hypothyroidism  Possible copd with ongoing tobacco abuse -- smoking cessation, outpt pft  pVD --agree with check raine  AGMA - starvation ketosis --severe getting CCRT  Fracture of arm -- ortho conulted, not really a candidate for any operative intervention right now., sling follow up 2 weeks    D/w family at bedside  Do not anticipate being able to wean today,     CC 40 mins.

## 2024-09-20 NOTE — PROGRESS NOTES
[facility] Progress Note      Patient Name: Dixon Montoya  : 1955  MRN: [id number]  Primary Care Physician:  Jaquelin Rueda, DAARSH  Date of admission: 2024    Subjective     Interval History:   Follow-up acute kidney injury, currently on CRRT.   The patient currently on CRRT, no fluid being removed he is on vasopressors, he has acute respiratory failure on the ventilator on 50% FiO2.  His pH was 7.6 he has hypokalemia and his bicarb drip was discontinued  Review of Systems:   Not obtained    Objective     Vitals:   Temp:  [98.1 °F (36.7 °C)-99.7 °F (37.6 °C)] 99 °F (37.2 °C)  Heart Rate:  [] 75  Resp:  [17-24] 20  BP: ()/(51-81) 96/57  FiO2 (%):  [49 %-98 %] 49 %    Intake/Output Summary (Last 24 hours) at 2024 0925  Last data filed at 2024 0900  Gross per 24 hour   Intake 4712.71 ml   Output 654 ml   Net 4058.71 ml       Physical Exam:    General Appearance: Chronically ill now acutely ill.  On ventilator.  Unresponsive skin: warm and dry  HEENT: oral ET tube., nonicteric sclera  Neck: No JVD, right IJ temporary dialysis catheter  Lungs: Lateral rhonchi, breathing effort not  Heart: RRR, tachycardic.  No S3 or rub.  Abdomen: soft, nontender, distended.  Mottling of the abdominal wall.  Few bowel sounds  : Carroll catheter anchored  Extremities: Mottled.  No significant edema.  Neuro: Unable to assess    Scheduled Meds:     cefepime, 2,000 mg, Intravenous, Q8H  chlorhexidine, 15 mL, Mouth/Throat, Q12H  citalopram, 20 mg, Nasogastric, Daily  folic acid, 1 mg, Nasogastric, Daily  heparin (porcine), 5,000 Units, Subcutaneous, Q8H  ipratropium-albuterol, 3 mL, Nebulization, 4x Daily - RT  levothyroxine, 25 mcg, Nasogastric, Q AM  LORazepam, 1 mg, Intravenous, Q12H   Followed by  [START ON 2024] LORazepam, 1 mg, Intravenous, Daily  metroNIDAZOLE, 500 mg, Intravenous, Q8H  pantoprazole, 40 mg, Intravenous, Q24H  sodium chloride, 10 mL, Intravenous,  Q12H  thiamine (B-1) IV, 200 mg, Intravenous, Q8H   Followed by  [START ON 9/23/2024] thiamine, 100 mg, Nasogastric, Daily  vancomycin, 750 mg, Intravenous, Q12H  [START ON 9/25/2024] vitamin D, 50,000 Units, Nasogastric, Q7 Days      IV Meds:   dexmedetomidine, 0.2-1.5 mcg/kg/hr, Last Rate: Stopped (09/19/24 2105)  Pharmacy to dose vancomycin,   phenylephrine, 0.5-3 mcg/kg/min, Last Rate: 1.3 mcg/kg/min (09/20/24 0620)  Phoxillum BK4/2.5, 1,500 mL/hr, Last Rate: 1,500 mL/hr (09/20/24 0632)  Phoxillum BK4/2.5, 1,500 mL/hr, Last Rate: 1,500 mL/hr (09/20/24 0632)  Phoxillum BK4/2.5, 1,500 mL/hr, Last Rate: 1,500 mL/hr (09/20/24 0632)  propofol, 5-50 mcg/kg/min, Last Rate: 50 mcg/kg/min (09/20/24 0420)  vasopressin, 0.03 Units/min        Results Reviewed:   I have personally reviewed the results from the time of this admission to 9/20/2024 09:25 EDT     Results from last 7 days   Lab Units 09/20/24  0750 09/20/24  0457 09/19/24  2356 09/19/24  1048 09/19/24  0534 09/18/24  1202 09/18/24  0609   SODIUM mmol/L 139 140 140   < > 141  141   < > 138   POTASSIUM mmol/L 3.6 3.0* 3.1*   < > 3.6  3.6   < > 2.6*   CHLORIDE mmol/L 104 105 108*   < > 109*  109*   < > 100   CO2 mmol/L 22.9 23.0 19.0*   < > 8.0*  8.0*   < > 11.0*   BUN mg/dL 30* 37* 56*   < > 119*  119*   < > 117*   CREATININE mg/dL 1.37* 1.63* 2.22*   < > 5.59*  5.59*   < > 6.82*   CALCIUM mg/dL 7.4* 7.3* 7.2*   < > 7.4*  7.4*   < > 6.5*   BILIRUBIN mg/dL  --  0.6  --   --  0.4  --  0.6   ALK PHOS U/L  --  95  --   --  106  --  98   ALT (SGPT) U/L  --  12  --   --  15  --  16   AST (SGOT) U/L  --  36  --   --  28  --  27   GLUCOSE mg/dL 106* 114* 107*   < > 79  79   < > 99    < > = values in this interval not displayed.       Estimated Creatinine Clearance: 59.9 mL/min (A) (by C-G formula based on SCr of 1.37 mg/dL (H)).    Results from last 7 days   Lab Units 09/20/24  0750 09/19/24  2356 09/19/24  1753   MAGNESIUM mg/dL 2.0 2.0 1.8   PHOSPHORUS mg/dL 2.8  3.3 4.7*             Results from last 7 days   Lab Units 09/20/24  0456 09/19/24  1048 09/19/24  0534 09/19/24  0056 09/18/24  1849 09/18/24  1511 09/18/24  1202 09/18/24  0609 09/18/24  0023   WBC 10*3/mm3 14.68*  --  20.92*  --   --   --  22.71* 22.43* 21.21*   HEMOGLOBIN g/dL 6.6*  --  7.9* 7.7* 8.1* 8.0* 8.1* 6.4* 5.6*   PLATELETS 10*3/mm3 104* 162 128*  --   --   --  139* 124* 137*       Results from last 7 days   Lab Units 09/18/24  1511   INR  1.26*       Assessment / Plan     ASSESSMENT:  Acute kidney injury, nonoliguric.  Probably ATN, with inability to autoregulate in the presence of hypovolemia and ACE inhibitor currently on CRRT, he has evidence alkalemia related to bicarbonate drip leading to hypokalemia     Acute hypoxemic, hypercapnic respiratory failure.  On the ventilator  3.  Shock most likely septic on vasopressors  4.  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  5.  Hypothyroid on replacement.  6.  Peripheral arterial disease with poor perfusion on admission yesterday.  .  7.  Anemia, globin this morning 6.6 receiving packed RBC  8.  Thrombocytopenia.  Had splenomegaly from cirrhosis with his history of alcohol abuse.  Check LDH and haptoglobin.  Probably will benefit from hematology evaluation  9. Alcohol abuse.   10. Left olecranon fracture.    PLAN:  Discontinue bicarb  Continue CRRT with no fluid removal  Surveillance lab    I reviewed the chart and other providers notes, reviewed labs.  I discussed the case with the patient's nurse at the bedside  Also I discussed the case with the patient's family  Copied text in this note has been reviewed and is accurate as of 09/20/24.     Thank you for involving us in the care of Dixon Montoya.  Please feel free to call with any questions.    Marcelino Stein MD  09/20/24  09:25 EDT    [facility]  [phone]    Please note that portions of this note were completed with a voice recognition program.

## 2024-09-20 NOTE — CONSULTS
Pt in bed on vent. Family, two daughters and son in law,  present at bedside. Chp offered introductions to Pt and sat with Pt to provide hospitality and supportive presence. Family waiting for news and next times for Pt. No spiritual needs assessed at this time. Chp remains available to Pt and family as needed.

## 2024-09-20 NOTE — CONSULTS
"Nutrition Services    Patient Name:  Dixon Montoya  YOB: 1955  MRN: [id number]  Admit Date:  9/17/2024    Assessment Date:  09/20/24    Summary: Follow up  Pt remains intubated and sedated on propofol at 22.6 ml/hr providing 596 kcals/day. CRRT initiated. RN reported TF were not started yesterday as MD wanted to see how pt would tolerate CRRT first. TF to be initiated this morning and advanced per order.    RECS:  Please initiate Peptamen Intense VHP at 10 ml/hr and advance 10 ml q 4 hrs as tolerated while monitoring for refeeding syndrome. Goal rate of 50 ml/hr will provide 1210 (1779 kcals while on propofol at current rate), 111 g/protein, and 1294 ml free water. Please flush 3 packets of prosource liquid protein to provide an additional 180 kcals and 45 g/protein. Please Flush 50 ml free water q 1 hr.     The above end goal rate is for 22 hrs/day to assume interruptions for ADLs. Water flushes adjusted based on clinical picture + Rx flushes/IV fluids     RD to follow     CLINICAL NUTRITION ASSESSMENT      Reason for Assessment Follow-up Protocol     Diagnosis/Problem   generalized weakness. Pt has a hx of HLD, HTN, ETOH abusem COPD, CAD, hepatic steatosis.   Medical/Surgical History Past Medical History:   Diagnosis Date    Anxiety     Depression     Hyperlipidemia     Hypertension        History reviewed. No pertinent surgical history.     Anthropometrics        Current Height  Current Weight  BMI kg/m2 Height: 172.7 cm (67.99\")  Weight: 80.9 kg (178 lb 5.6 oz) (09/20/24 0400)  Body mass index is 27.12 kg/m².   Adjusted BMI (if applicable)    BMI Category Overweight (25 - 29.9)   Ideal Body Weight (IBW) 150 lbs   Usual Body Weight (UBW) 170 lbs   Weight Trend Stable   Weight History Wt Readings from Last 30 Encounters:   09/20/24 0400 80.9 kg (178 lb 5.6 oz)   09/19/24 0400 75.2 kg (165 lb 12.6 oz)   09/18/24 0952 78.5 kg (173 lb)   09/17/24 2341 78.5 kg (173 lb)        Estimated/Assessed Needs     "    Energy Requirements    Weight for Calculation 75 kg   Method for Estimation  kcal/kg, other:20-25   EST Needs (kcal/day) 1459-6257       Protein Requirements    Weight for Calculation 75 kg   EST Protein Needs (g/kg) gm/kg, other:2-2.5   EST Daily Needs (g/day) 150-187       Fluid Requirements     Method for Estimation 1 mL/kcal    Estimated Needs (mL/day)        Fluid Deficit    Current Na Level (mEq/L)    Desired Na Level (mEq/L)    Estimated Fluid Deficit (L)       Labs       Pertinent Labs    Results from last 7 days   Lab Units 09/20/24 0750 09/20/24 0457 09/19/24 2356 09/19/24  1048 09/19/24  0534 09/18/24  1202 09/18/24  0609   SODIUM mmol/L 139 140 140   < > 141  141   < > 138   POTASSIUM mmol/L 3.6 3.0* 3.1*   < > 3.6  3.6   < > 2.6*   CHLORIDE mmol/L 104 105 108*   < > 109*  109*   < > 100   CO2 mmol/L 22.9 23.0 19.0*   < > 8.0*  8.0*   < > 11.0*   BUN mg/dL 30* 37* 56*   < > 119*  119*   < > 117*   CREATININE mg/dL 1.37* 1.63* 2.22*   < > 5.59*  5.59*   < > 6.82*   CALCIUM mg/dL 7.4* 7.3* 7.2*   < > 7.4*  7.4*   < > 6.5*   BILIRUBIN mg/dL  --  0.6  --   --  0.4  --  0.6   ALK PHOS U/L  --  95  --   --  106  --  98   ALT (SGPT) U/L  --  12  --   --  15  --  16   AST (SGOT) U/L  --  36  --   --  28  --  27   GLUCOSE mg/dL 106* 114* 107*   < > 79  79   < > 99    < > = values in this interval not displayed.     Results from last 7 days   Lab Units 09/20/24  0750 09/20/24 0457 09/20/24  0456 09/19/24  2356 09/19/24  1753   MAGNESIUM mg/dL 2.0  --   --  2.0 1.8   PHOSPHORUS mg/dL 2.8  --   --  3.3 4.7*   HEMOGLOBIN g/dL  --   --  6.6*  --   --    HEMATOCRIT %  --   --  19.7*  --   --    WBC 10*3/mm3  --   --  14.68*  --   --    ALBUMIN g/dL 1.9*   < >  --  1.9* 2.3*    < > = values in this interval not displayed.     Results from last 7 days   Lab Units 09/20/24  0456 09/19/24  1048 09/19/24  0534 09/18/24  1511 09/18/24  1202 09/18/24  0609   INR   --   --   --  1.26*  --   --    PLATELETS  10*3/mm3 104* 162 128*  --  139* 124*     COVID19   Date Value Ref Range Status   09/18/2024 Not Detected Not Detected - Ref. Range Final     Lab Results   Component Value Date    HGBA1C 4.9 07/16/2024          Medications           Scheduled Medications cefepime, 2,000 mg, Intravenous, Q8H  chlorhexidine, 15 mL, Mouth/Throat, Q12H  citalopram, 20 mg, Nasogastric, Daily  folic acid, 1 mg, Nasogastric, Daily  heparin (porcine), 5,000 Units, Subcutaneous, Q8H  ipratropium-albuterol, 3 mL, Nebulization, 4x Daily - RT  levothyroxine, 25 mcg, Nasogastric, Q AM  LORazepam, 1 mg, Intravenous, Q12H   Followed by  [START ON 9/21/2024] LORazepam, 1 mg, Intravenous, Daily  metroNIDAZOLE, 500 mg, Intravenous, Q8H  pantoprazole, 40 mg, Intravenous, Q24H  sodium chloride, 10 mL, Intravenous, Q12H  thiamine (B-1) IV, 200 mg, Intravenous, Q8H   Followed by  [START ON 9/23/2024] thiamine, 100 mg, Nasogastric, Daily  vancomycin, 750 mg, Intravenous, Q12H  [START ON 9/25/2024] vitamin D, 50,000 Units, Nasogastric, Q7 Days       Infusions dexmedetomidine, 0.2-1.5 mcg/kg/hr, Last Rate: Stopped (09/19/24 2105)  Pharmacy to dose vancomycin,   phenylephrine, 0.5-3 mcg/kg/min, Last Rate: 1.5 mcg/kg/min (09/20/24 1005)  Phoxillum BK4/2.5, 1,500 mL/hr, Last Rate: 1,500 mL/hr (09/20/24 0632)  Phoxillum BK4/2.5, 1,500 mL/hr, Last Rate: 1,500 mL/hr (09/20/24 0632)  Phoxillum BK4/2.5, 1,500 mL/hr, Last Rate: 1,500 mL/hr (09/20/24 0632)  propofol, 5-50 mcg/kg/min, Last Rate: 50 mcg/kg/min (09/20/24 0950)  vasopressin, 0.03 Units/min       PRN Medications   acetaminophen **OR** acetaminophen **OR** acetaminophen    albuterol    aluminum-magnesium hydroxide-simethicone    senna-docusate sodium **AND** polyethylene glycol **AND** bisacodyl **AND** bisacodyl    Calcium Replacement - Follow Nurse / BPA Driven Protocol    fentaNYL citrate (PF)    heparin (porcine)    heparin    LORazepam **OR** LORazepam **OR** LORazepam **OR** LORazepam **OR**  LORazepam **OR** LORazepam    Magnesium Standard Dose Replacement - Follow Nurse / BPA Driven Protocol    ondansetron ODT **OR** ondansetron    Pharmacy to dose vancomycin    Phosphorus Replacement - Follow Nurse / BPA Driven Protocol    sodium chloride    vasopressin     Physical Findings          General Findings ventilator support   Oral/Mouth Cavity dental caries   Edema  1+ (trace)   Gastrointestinal WDL   Skin  skin intact   Tubes/Drains/Lines Cortrak, dialysis catheter, NG tube, bridle in place   NFPE Not indicated at this time     Current Nutrition Orders & Evaluation of Intake       Oral Nutrition     Food Allergies NKFA   Current PO Diet NPO Diet NPO Type: Tube Feeding   Supplement n/a   PO Evaluation     % PO Intake NPO    Factors Affecting Intake: Other: Intubated     PES STATEMENT / NUTRITION DIAGNOSIS      Nutrition Dx Problem  Problem: Needs Alternative Composition  Etiology: Medical Diagnosis - generalized weakness. Pt has a hx of HLD, HTN, ETOH abusem COPD, CAD, hepatic steatosis.    Signs/Symptoms: Report/Observation   --  NUTRITION INTERVENTION / PLAN OF CARE      Intervention Goal(s) Maintain nutrition status, Establish goals of care, Meet estimated needs, Initiate TF/PN, and Tolerate TF/PN at goal         RD Intervention/Action Await initiation of EN/PN, Continue to monitor, Care plan reviewed, and Recommend/order: EN         Prescription/Orders:       PO Diet       Supplements       Enteral Nutrition    Enteral Prescription:     Enteral Route NG    TF Delivery Method Continuous    Enteral Product Peptamen Intense VHP    Modular None    Propofol Rate/Kcal 22.6 ml/hr 596 kcals    TF Start Rate  10 mL/hr    TF Goal Rate  50 mL/hr    Free Water Flush 50 mL Q 1 hr    Provision at Goal:          Calories 1210 kcal (1959 while on propofol + prosource), meets 100% needs         Protein  156 gm protein, meets 100% needs         Fluid (mL) 1016 mL free water + 1200 mL in flushes         Parenteral  Nutrition    New Prescription Ordered? Yes   --      Monitor/Evaluation Per protocol, Pertinent labs, EN delivery/tolerance, Weight, Skin status, GI status, Symptoms, POC/GOC, Swallow function   Discharge Plan/Needs No discharge needs identified at this time     RD to follow per protocol.      Electronically signed by:  Mayo Pérez RDN, LD  09/20/24 11:26 EDT

## 2024-09-20 NOTE — PROGRESS NOTES
LOS: 2 days   Patient Care Team:  Jaquelin Rueda APRN as PCP - General (Nurse Practitioner)    Chief Complaint:  Following for cardiomyopathy, troponin elevation    Interval History:   Remains intubated, on CRRT, family bedside today.    Objective   Vital Signs  Temp:  [98.1 °F (36.7 °C)-99.7 °F (37.6 °C)] 99.1 °F (37.3 °C)  Heart Rate:  [] 81  Resp:  [17-34] 24  BP: ()/(51-81) 90/54  FiO2 (%):  [49 %-98 %] 49 %    Intake/Output Summary (Last 24 hours) at 9/20/2024 0804  Last data filed at 9/20/2024 0700  Gross per 24 hour   Intake 4366.46 ml   Output 654 ml   Net 3712.46 ml       Comfortable NAD, resting in bed on ventilator, sedated  PERRL, conjunctivae clear  Neck supple, no JVD or thyromegaly appreciated  S1/S2 tachycardic, regular, no m/r/g  Scattered, coarse respiratory sounds on ventilator.  Abdomen S/NT/ND (+) BS, no HSM appreciated  Extremities warm, no clubbing, cyanosis, no significant lower extremity edema, color much better on today's exam.  No further mottling.  No lower extremity  No visible or palpable skin lesions  Intubated and sedated, does not follow commands    Results Review:      Results from last 7 days   Lab Units 09/20/24  0457 09/19/24  2356 09/19/24  1753   SODIUM mmol/L 140 140 145   POTASSIUM mmol/L 3.0* 3.1* 2.8*   CHLORIDE mmol/L 105 108* 109*   CO2 mmol/L 23.0 19.0* 17.0*   BUN mg/dL 37* 56* 89*   CREATININE mg/dL 1.63* 2.22* 3.68*   GLUCOSE mg/dL 114* 107* 97   CALCIUM mg/dL 7.3* 7.2* 7.3*     Results from last 7 days   Lab Units 09/19/24  0534 09/18/24  0609 09/18/24  0300 09/18/24  0023   CK TOTAL U/L 681* 692*  --   --    HSTROP T ng/L  --   --  600* 665*     Results from last 7 days   Lab Units 09/20/24  0456 09/19/24  1048 09/19/24  0534 09/19/24  0056 09/18/24  1511 09/18/24  1202   WBC 10*3/mm3 14.68*  --  20.92*  --   --  22.71*   HEMOGLOBIN g/dL 6.6*  --  7.9* 7.7*   < > 8.1*   HEMATOCRIT % 19.7*  --  24.0* 22.2*   < > 24.4*  23.7*   PLATELETS 10*3/mm3  104* 162 128*  --   --  139*    < > = values in this interval not displayed.     Results from last 7 days   Lab Units 09/18/24  1511   INR  1.26*         Results from last 7 days   Lab Units 09/19/24  2356   MAGNESIUM mg/dL 2.0           I reviewed the patient's new clinical results.  I personally viewed and interpreted the patient's EKG/Telemetry data          Medication Review:   cefepime, 2,000 mg, Intravenous, Q8H  chlorhexidine, 15 mL, Mouth/Throat, Q12H  citalopram, 20 mg, Nasogastric, Daily  folic acid, 1 mg, Nasogastric, Daily  heparin (porcine), 5,000 Units, Subcutaneous, Q8H  ipratropium-albuterol, 3 mL, Nebulization, 4x Daily - RT  levothyroxine, 25 mcg, Nasogastric, Q AM  LORazepam, 1 mg, Intravenous, Q6H   Followed by  LORazepam, 1 mg, Intravenous, Q12H   Followed by  [START ON 9/21/2024] LORazepam, 1 mg, Intravenous, Daily  metroNIDAZOLE, 500 mg, Intravenous, Q8H  pantoprazole, 40 mg, Intravenous, Q24H  sodium chloride, 10 mL, Intravenous, Q12H  thiamine (B-1) IV, 200 mg, Intravenous, Q8H   Followed by  [START ON 9/23/2024] thiamine, 100 mg, Nasogastric, Daily  vancomycin, 750 mg, Intravenous, Q12H  [START ON 9/25/2024] vitamin D, 50,000 Units, Nasogastric, Q7 Days        dexmedetomidine, 0.2-1.5 mcg/kg/hr, Last Rate: Stopped (09/19/24 2105)  Pharmacy to dose vancomycin,   phenylephrine, 0.5-3 mcg/kg/min, Last Rate: 1.3 mcg/kg/min (09/20/24 0620)  Phoxillum BK4/2.5, 1,500 mL/hr, Last Rate: 1,500 mL/hr (09/20/24 0632)  Phoxillum BK4/2.5, 1,500 mL/hr, Last Rate: 1,500 mL/hr (09/20/24 0632)  Phoxillum BK4/2.5, 1,500 mL/hr, Last Rate: 1,500 mL/hr (09/20/24 0632)  propofol, 5-50 mcg/kg/min, Last Rate: 50 mcg/kg/min (09/20/24 2370)  vasopressin, 0.03 Units/min        Assessment & Plan       Sepsis    SHANA (acute kidney injury)    Hypokalemia    CAD (coronary artery disease)    Alcohol use disorder    Macrocytic anemia    Troponin elevation.  Unclear etiology.  Cardiomyopathy, ischemic, EF 36 to 40%  Troponins  flat at 665, 600  Likely multifactorial, related to anemia, acidosis, SHANA less likely ACS given downward trend.  Unclear if type II NSTEMI, versus nonischemic myocardial injury.  Awaiting records to determine chronicity of wall motion abnormalities on TTE  Will need to consider aspirin, but holding for now given his tenuous status  Awaiting records from his previous echocardiogram reports at [facility] in [place].  Nothing in care everywhere.  Left bundle branch block.  Chronicity unknown.  Awaiting records.  Anemia.  Workup ongoing.  Acute respiratory failure requiring intubation, remains on vent  SHANA.  Nephrology following.  Significant anion gap metabolic acidosis.  Remains on CRRT  Sepsis with suspected bacterial pneumonia.  On antibiotics.  Alcohol abuse, complicating all the above.  Peripheral vascular disease.  Absent pulses to bilateral lower extremities on my exam.  ABIs pending.  Anion gap metabolic acidosis.  Possible starvation ketosis.  Improving with CRRT, continue ABG trends  Shock.  Increasing pressor requirement.  Unclear related to acidosis versus septic.  Workup ongoing.  May need pressor rotation to norepinephrine.      Continue supportive care with CRRT.  No much else to offer the cardiac standpoint.  He is on phenylephrine for pressor support.        Jasper Nguyen MD  09/20/24  08:04 EDT      Part of this note may be an electronic transcription/translation of spoken language to printed text using the Dragon Dictation System.

## 2024-09-21 NOTE — PLAN OF CARE
Goal Outcome Evaluation:                   Pt remains intubated. CRRT was  D/C at 11am per order. Pt was off propofol for 3 hours, opened eyes to voice, and moved BLE spontaneously. Propofol restarted for ventilator compliance. Pt is off jacinto and BP remained stable. Temp have been stable. VSS

## 2024-09-21 NOTE — PROGRESS NOTES
[facility] Progress Note      Patient Name: Dixon Montoya  : 1955  MRN: [id number]  Primary Care Physician:  Jaquelin Rueda APRN  Date of admission: 2024    Subjective     Interval History:   F/u SHANA    Review of Systems:   On CRRT without UF.   Off pressor.   mm Hg  On ventilator 50% FIO2  Starting to make some urine      Objective     Vitals:   Temp:  [98.2 °F (36.8 °C)-99.3 °F (37.4 °C)] 98.2 °F (36.8 °C)  Heart Rate:  [] 101  Resp:  [18-22] 20  BP: ()/(55-76) 119/70  FiO2 (%):  [40 %-50 %] 50 %    Intake/Output Summary (Last 24 hours) at 2024 1057  Last data filed at 2024 1000  Gross per 24 hour   Intake 460.36 ml   Output 220 ml   Net 240.36 ml       Physical Exam:    General Appearance: frail WM sedated on ventilator and CRRT  Neck: RIJ shiley in use, no JVD  Lungs: coarse BS bilat  Heart: RRR, normal S1 and S2  Abdomen: soft, nontender, nondistended  : chase with approx 150 cc urine  Extremities: 1+ BLE edema    Scheduled Meds:     cefepime, 2,000 mg, Intravenous, Q8H  chlorhexidine, 15 mL, Mouth/Throat, Q12H  citalopram, 20 mg, Nasogastric, Daily  folic acid, 1 mg, Nasogastric, Daily  heparin (porcine), 5,000 Units, Subcutaneous, Q8H  ipratropium-albuterol, 3 mL, Nebulization, 4x Daily - RT  levothyroxine, 25 mcg, Nasogastric, Q AM  metroNIDAZOLE, 500 mg, Intravenous, Q8H  pantoprazole, 40 mg, Intravenous, Q24H  sodium chloride, 10 mL, Intravenous, Q12H  thiamine (B-1) IV, 200 mg, Intravenous, Q8H   Followed by  [START ON 2024] thiamine, 100 mg, Nasogastric, Daily  [START ON 2024] vitamin D, 50,000 Units, Nasogastric, Q7 Days      IV Meds:   dexmedetomidine, 0.2-1.5 mcg/kg/hr, Last Rate: Stopped (24 2105)  phenylephrine, 0.5-3 mcg/kg/min, Last Rate: Stopped (24 0800)  Phoxillum BK4/2.5, 1,500 mL/hr, Last Rate: 1,500 mL/hr (24 0829)  Phoxillum BK4/2.5, 1,500 mL/hr, Last Rate: 1,500 mL/hr (24  0829)  Phoxillum BK4/2.5, 1,500 mL/hr, Last Rate: 1,500 mL/hr (09/21/24 0828)  propofol, 5-50 mcg/kg/min, Last Rate: 30 mcg/kg/min (09/21/24 1047)  vasopressin, 0.03 Units/min        Results Reviewed:   I have personally reviewed the results from the time of this admission to 9/21/2024 10:57 EDT     Results from last 7 days   Lab Units 09/21/24  0823 09/21/24  0427 09/21/24  0044 09/20/24  0750 09/20/24  0457 09/19/24  1048 09/19/24  0534   SODIUM mmol/L 135* 138 137   < > 140   < > 141  141   POTASSIUM mmol/L 3.9 3.7 3.7   < > 3.0*   < > 3.6  3.6   CHLORIDE mmol/L 103 105 105   < > 105   < > 109*  109*   CO2 mmol/L 21.0* 20.2* 21.5*   < > 23.0   < > 8.0*  8.0*   BUN mg/dL 12 14 14   < > 37*   < > 119*  119*   CREATININE mg/dL 0.64* 0.78 0.84   < > 1.63*   < > 5.59*  5.59*   CALCIUM mg/dL 7.6* 7.4* 7.4*   < > 7.3*   < > 7.4*  7.4*   BILIRUBIN mg/dL  --  0.7  --   --  0.6  --  0.4   ALK PHOS U/L  --  158*  --   --  95  --  106   ALT (SGPT) U/L  --  22  --   --  12  --  15   AST (SGOT) U/L  --  78*  --   --  36  --  28   GLUCOSE mg/dL 95 75 79   < > 114*   < > 79  79    < > = values in this interval not displayed.     Estimated Creatinine Clearance: 118.7 mL/min (A) (by C-G formula based on SCr of 0.64 mg/dL (L)).  Results from last 7 days   Lab Units 09/21/24  0823 09/21/24  0044 09/20/24  1610   MAGNESIUM mg/dL 2.2 2.2 2.1   PHOSPHORUS mg/dL 3.5 3.7 3.1         Results from last 7 days   Lab Units 09/21/24  0427 09/20/24  1610 09/20/24  0456 09/19/24  1048 09/19/24  0534 09/19/24  0056 09/18/24  1511 09/18/24  1202 09/18/24  0609   WBC 10*3/mm3 17.47*  --  14.68*  --  20.92*  --   --  22.71* 22.43*   HEMOGLOBIN g/dL 7.7* 7.9* 6.6*  --  7.9* 7.7*   < > 8.1* 6.4*   PLATELETS 10*3/mm3 80*  --  104* 162 128*  --   --  139* 124*    < > = values in this interval not displayed.     Results from last 7 days   Lab Units 09/18/24  1511   INR  1.26*       Assessment / Plan     ASSESSMENT:  Olig SHANA.  Probably ATN, with  inability to autoregulate in the presence of hypovolemia and ACE inhibitor currently on CRRT, primarily for metabolic acidosis which has improved, bicarb is 21 with normal AG.  Waste products low and K normal.  Mild vol excess present, haven't been doing UF.  UOP picked up some.  Stop CRRT, can do regular HD tomorrow if needed  Acute hypoxemic, hypercapnic respiratory failure.  On the ventilator 50% FIO2  Septic shock - PNA/UTI, abx per PULM, off pressor  HFrEF, EF 35% by echo 9/18/24  Anemia, transfused, hgb up 7.9 yesterday PM, 7.7 today  TCP, PLT dropping, 80K today  AG metabolic acidosis - starvation ketosis suspected, improved with CRRT  Left olecranon fracture  Hx ETOH abuse     PLAN:  Halt CRRT  Repeat BMP later to watch for rebound in acidosis   Diuretic challenge today  Can attempt regular HD tomorrow (with UF) if needed   D/W RN & family at bedside       Grzegorz Juan MD  09/21/24  10:57 EDT    [facility]  [phone]

## 2024-09-21 NOTE — PROGRESS NOTES
"  Daily Progress Note.   [facility] INTENSIVE CARE  9/21/2024    Patient:  Name:  Dixon Montoya  MRN:  [id number]  1955  68 y.o.  male         CC: Critical care management    Interval History:  Maintains on mechanical ventilation.  Unresponsive to stimuli propofol just paused.  Maintains on CRRT mechanical ventilation.  Afebrile overnight  Vasopressors weaning  Unable to give any reliable history    Physical Exam:  /70   Pulse 96   Temp 98.4 °F (36.9 °C)   Resp 20   Ht 172.7 cm (67.99\")   Wt 84.7 kg (186 lb 11.7 oz)   SpO2 97%   BMI 28.40 kg/m²   Body mass index is 28.4 kg/m².    Intake/Output Summary (Last 24 hours) at 9/21/2024 0854  Last data filed at 9/21/2024 0800  Gross per 24 hour   Intake 260.36 ml   Output 210 ml   Net 50.36 ml   Vent Settings          Resp Rate (Set): 10     FiO2 (%): 50 %  PEEP/CPAP (cm H2O): 5 cm H20    Minute Ventilation (L/min) (Obs): 15.8 L/min  Resp Rate (Observed) Vent: 22  I:E Ratio (Set): 1:5.67  I:E Ratio (Obs): 1:1.9    PIP Observed (cm H2O): 24 cm H2O  Plateau Pressure (cm H2O): 0 cm H2O  Driving Pressure (cm H2O): -4.4 cm H2O    General appearance: Ill on mechanical ventilation unresponsive  HENT: Positive ET tube  Lungs: CTA, w bilateral air entry mechanical synchronous with mechanical respiration respiratory effort and no intercostal retractions  CV: RRR, no rub   Abdomen: Nonrigid  Extremities: Positive peripheral edema or ext lad  Skin: Warm  Psych/neuro sedated limiting exam will not follow commands    Data Review:  Notable Labs:  Results from last 7 days   Lab Units 09/21/24  0427 09/20/24  1610 09/20/24  0456 09/19/24  1048 09/19/24  0534 09/19/24  0056 09/18/24  1849 09/18/24  1511 09/18/24  1202 09/18/24  0609 09/18/24  0023   WBC 10*3/mm3 17.47*  --  14.68*  --  20.92*  --   --   --  22.71* 22.43* 21.21*   HEMOGLOBIN g/dL 7.7* 7.9* 6.6*  --  7.9* 7.7* 8.1* 8.0* 8.1* 6.4* 5.6*   PLATELETS 10*3/mm3 80*  --  104* 162 128*  --   --   --  " 139* 124* 137*     Results from last 7 days   Lab Units 09/21/24  0427 09/21/24  0044 09/20/24  1610 09/20/24  0750 09/20/24  0457 09/19/24  2356 09/19/24  1753 09/19/24  1048 09/19/24  0534   SODIUM mmol/L 138 137 138 139 140 140 145 147* 141  141   POTASSIUM mmol/L 3.7 3.7 3.7 3.6 3.0* 3.1* 2.8* 3.8 3.6  3.6   CHLORIDE mmol/L 105 105 106 104 105 108* 109* 109* 109*  109*   CO2 mmol/L 20.2* 21.5* 21.0* 22.9 23.0 19.0* 17.0* 11.0* 8.0*  8.0*   BUN mg/dL 14 14 20 30* 37* 56* 89* 123* 119*  119*   CREATININE mg/dL 0.78 0.84 0.98 1.37* 1.63* 2.22* 3.68* 5.87* 5.59*  5.59*   GLUCOSE mg/dL 75 79 70 106* 114* 107* 97 112* 79  79   CALCIUM mg/dL 7.4* 7.4* 7.4* 7.4* 7.3* 7.2* 7.3* 7.3* 7.4*  7.4*   MAGNESIUM mg/dL  --  2.2 2.1 2.0  --  2.0 1.8 2.0 2.0   PHOSPHORUS mg/dL  --  3.7 3.1 2.8  --  3.3 4.7* 9.0* 7.4*   Estimated Creatinine Clearance: 97.4 mL/min (by C-G formula based on SCr of 0.78 mg/dL).    Results from last 7 days   Lab Units 09/21/24  0427 09/20/24  0457 09/20/24  0456 09/19/24  1048 09/19/24  0534 09/18/24  0609 09/18/24  0300 09/18/24  0145 09/18/24  0023   LDH U/L  --   --   --   --  543*  --   --   --   --    AST (SGOT) U/L 78* 36  --   --  28   < >  --   --  31   ALT (SGPT) U/L 22 12  --   --  15   < >  --   --  19   PROCALCITONIN ng/mL  --   --   --   --   --   --   --   --  1.41*   LACTATE mmol/L 1.2 1.5  --  1.5  --    < >  --    < >  --    FERRITIN ng/mL  --   --   --   --   --   --  1,835.00*  --   --    PLATELETS 10*3/mm3 80*  --  104* 162 128*   < >  --   --  137*    < > = values in this interval not displayed.       Results from last 7 days   Lab Units 09/21/24  1039 09/20/24  0535 09/19/24  1307 09/19/24  0817 09/19/24  0637 09/18/24  1355   PH, ARTERIAL pH units 7.483* 7.600* 7.207* 7.110* 7.051* 7.378   PCO2, ARTERIAL mm Hg 27.8* 25.1* 34.9* 25.4* 33.5* 17.2*   PO2 ART mm Hg 64.4* 90.5 108.9* 191.9* 50.7* 60.9*   HCO3 ART mmol/L 20.8* 24.6 13.8* 8.1* 9.3* 10.1*            Urine Culture  - Urine, Straight Cath [[id number]]     (Abnormal)   Urine from Straight Cath    Final result Component Value   Urine Culture >100,000 CFU/mL Klebsiella oxytoca Abnormal          Susceptibility     Klebsiella oxytoca     GABRIELE     Amoxicillin + Clavulanate >=32 Resistant     Ampicillin >=32 Resistant     Ampicillin + Sulbactam >=32 Resistant     Cefazolin >=64 Resistant     Cefepime <=1 Susceptible     Ceftazidime <=1 Susceptible     Ceftriaxone 8 Resistant     Gentamicin <=1 Susceptible     Levofloxacin <=0.12 Susceptible     Nitrofurantoin 32 Susceptible     Piperacillin + Tazobactam >=128 Resistant     Trimethoprim + Sulfamethoxazole <=20 Susceptible                          Imaging:  Reviewed chest images personally from past 3 days    ASSESSMENT  /  PLAN:  Septic shock  Acute hypoxemic respiratory failure mechanical ventilation reviewed  Pneumonia  UTI  Alcohol abuse  Coronary artery disease  Hypothyroidism  Anemia  SHANA  Possible underlying COPD  PVD  AGMA  Thrombocytopenia  Fracture of arm -ortho conulted, not really a candidate for any operative intervention right now., sling follow up 2 weeks  Heart failure systolic acute decompensated EF 35%      Renal replacement therapy/CRRT/metabolic acidosis per nephrology holding on CRRT today coordinated care with Dr. Juan  Bumex challenge today    Recheck ABG showing improvement  Mechanical ventilation reviewed decreased FiO2.  Chest x-ray reviewed.  DuoNebs    On and off vasopressors currently off.      Thiamine  Folic acid     Flagyl/cefepime  Klebsiella oxytocin reviewed    Seen in critical care services from Dr. Mike Lizama discussed patient with coordinating care.    discussed with nursing.  Discussed with family at bedside all questions answered    Total critical care time was 50 minutes, excluding any separately billable procedure time.  Time did not overlap with any other provider.      Electronically signed by Christoph Lizama MD, 09/21/24, 1:11 PM  EDT.

## 2024-09-21 NOTE — PLAN OF CARE
Goal Outcome Evaluation: Pt remains intubated and on CRRT. VSS through the shift, pressors titrated down (see MAR/flowsheet). Stool sent for occult blood(see results). Serial labs ongoing. CHG bath completed and documented. Family at the bedside and updated on care.

## 2024-09-21 NOTE — PROGRESS NOTES
LOS: 3 days   Patient Care Team:  Jaquelin Rueda APRN as PCP - General (Nurse Practitioner)    Chief Complaint:  Following for cardiomyopathy, troponin elevation    Interval History:   Remains on CRRT, more interactive per family.    Objective   Vital Signs  Temp:  [98.2 °F (36.8 °C)-99.3 °F (37.4 °C)] 98.4 °F (36.9 °C)  Heart Rate:  [73-98] 96  Resp:  [18-22] 20  BP: ()/(54-76) 125/70  FiO2 (%):  [40 %-50 %] 50 %    Intake/Output Summary (Last 24 hours) at 9/21/2024 0812  Last data filed at 9/21/2024 0700  Gross per 24 hour   Intake 606.61 ml   Output 210 ml   Net 396.61 ml       Comfortable NAD, resting in bed on ventilator, off sedation,  PERRL, conjunctivae clear  Neck supple, no JVD or thyromegaly appreciated  S1/S2 tachycardic, regular, no m/r/g  Scattered, coarse respiratory sounds on ventilator.  Abdomen S/NT/ND (+) BS, no HSM appreciated  Extremities warm, no clubbing, cyanosis, no significant lower, 1+ bilateral upper extremity edema.    No significant lower extremity edema no further mottling.  No visible or palpable skin lesions  Intubated and sedated, does not follow commands    Results Review:      Results from last 7 days   Lab Units 09/21/24  0427 09/21/24  0044 09/20/24  1610   SODIUM mmol/L 138 137 138   POTASSIUM mmol/L 3.7 3.7 3.7   CHLORIDE mmol/L 105 105 106   CO2 mmol/L 20.2* 21.5* 21.0*   BUN mg/dL 14 14 20   CREATININE mg/dL 0.78 0.84 0.98   GLUCOSE mg/dL 75 79 70   CALCIUM mg/dL 7.4* 7.4* 7.4*     Results from last 7 days   Lab Units 09/19/24  0534 09/18/24  0609 09/18/24  0300 09/18/24  0023   CK TOTAL U/L 681* 692*  --   --    HSTROP T ng/L  --   --  600* 665*     Results from last 7 days   Lab Units 09/21/24  0427 09/20/24  1610 09/20/24  0456 09/19/24  1048 09/19/24  0534   WBC 10*3/mm3 17.47*  --  14.68*  --  20.92*   HEMOGLOBIN g/dL 7.7* 7.9* 6.6*  --  7.9*   HEMATOCRIT % 21.9* 22.7* 19.7*  --  24.0*   PLATELETS 10*3/mm3 80*  --  104* 162 128*     Results from last 7 days    Lab Units 09/18/24  1511   INR  1.26*         Results from last 7 days   Lab Units 09/21/24  0044   MAGNESIUM mg/dL 2.2     Results from last 7 days   Lab Units 09/21/24  0427   TRIGLYCERIDES mg/dL 392*       I reviewed the patient's new clinical results.  I personally viewed and interpreted the patient's EKG/Telemetry data          Medication Review:   cefepime, 2,000 mg, Intravenous, Q8H  chlorhexidine, 15 mL, Mouth/Throat, Q12H  citalopram, 20 mg, Nasogastric, Daily  folic acid, 1 mg, Nasogastric, Daily  heparin (porcine), 5,000 Units, Subcutaneous, Q8H  ipratropium-albuterol, 3 mL, Nebulization, 4x Daily - RT  levothyroxine, 25 mcg, Nasogastric, Q AM  LORazepam, 1 mg, Intravenous, Q12H   Followed by  LORazepam, 1 mg, Intravenous, Daily  metroNIDAZOLE, 500 mg, Intravenous, Q8H  pantoprazole, 40 mg, Intravenous, Q24H  sodium chloride, 10 mL, Intravenous, Q12H  thiamine (B-1) IV, 200 mg, Intravenous, Q8H   Followed by  [START ON 9/23/2024] thiamine, 100 mg, Nasogastric, Daily  [START ON 9/25/2024] vitamin D, 50,000 Units, Nasogastric, Q7 Days        dexmedetomidine, 0.2-1.5 mcg/kg/hr, Last Rate: Stopped (09/19/24 2105)  phenylephrine, 0.5-3 mcg/kg/min, Last Rate: 0.8 mcg/kg/min (09/21/24 0655)  Phoxillum BK4/2.5, 1,500 mL/hr, Last Rate: 1,500 mL/hr (09/21/24 0159)  Phoxillum BK4/2.5, 1,500 mL/hr, Last Rate: 1,500 mL/hr (09/21/24 0159)  Phoxillum BK4/2.5, 1,500 mL/hr, Last Rate: 1,500 mL/hr (09/21/24 0159)  propofol, 5-50 mcg/kg/min, Last Rate: 40 mcg/kg/min (09/21/24 0620)  vasopressin, 0.03 Units/min        Assessment & Plan       Sepsis    SHANA (acute kidney injury)    Hypokalemia    CAD (coronary artery disease)    Alcohol use disorder    Macrocytic anemia    Troponin elevation.  Unclear etiology.  Cardiomyopathy, ischemic, EF 36 to 40%  Troponins flat at 665, 600  Likely multifactorial, related to anemia, acidosis, SHANA less likely ACS given downward trend.  Unclear if type II NSTEMI, versus nonischemic  myocardial injury.  Awaiting records to determine chronicity of wall motion abnormalities on TTE  Will need to consider aspirin, but holding for now given his tenuous status  Awaiting records from his previous echocardiogram reports at [facility] in [place].  Nothing in care everywhere.  Left bundle branch block.  Chronicity unknown.  Awaiting records.  Anemia.  Multifactorial due to sepsis, critical illness, no evidence of acute blood loss but trending.  Acute respiratory failure requiring intubation, remains on vent  SHANA.  Nephrology following.  Significant anion gap metabolic acidosis.  Remains on CRRT, but planning for cessation and trial off CRRT for now.  Sepsis with suspected bacterial pneumonia.  On antibiotics.  Sputum culture without an organism just yet.  UTI, noted with Klebsiella and urine  Alcohol abuse, complicating all the above.  Peripheral vascular disease.  Absent pulses to bilateral lower extremities on my exam.  ABIs pending.  Anion gap metabolic acidosis.  Possible starvation ketosis.  Improving with CRRT, continue ABG trends  Shock.  Increasing pressor requirement.  Unclear related to acidosis versus septic.  Workup ongoing.  May need pressor rotation to norepinephrine.  Thrombocytopenia.  Likely related to septic shock           Jasper Nguyen MD  09/21/24  08:12 EDT      Part of this note may be an electronic transcription/translation of spoken language to printed text using the Dragon Dictation System.

## 2024-09-22 NOTE — PROGRESS NOTES
[facility] Progress Note      Patient Name: Dixon Montoya  : 1955  MRN: [id number]  Primary Care Physician:  Jaquelin Rueda APRN  Date of admission: 2024    Subjective     Interval History:   F/u SHANA    Review of Systems:   Stopped CRRT yesterday and gave diuretic  Robust UOP 2500 cc (1L pos/24h)   FIO2 down to 40%  BP stable off pressor    Objective     Vitals:   Temp:  [98.2 °F (36.8 °C)-99.9 °F (37.7 °C)] 99.5 °F (37.5 °C)  Heart Rate:  [101-121] 112  Resp:  [18-23] 20  BP: (110-134)/(62-83) 110/66  FiO2 (%):  [40 %-50 %] 41 %    Intake/Output Summary (Last 24 hours) at 2024 0955  Last data filed at 2024 0631  Gross per 24 hour   Intake 3176 ml   Output 2450 ml   Net 726 ml       Physical Exam:    General Appearance: frail WM sedated on ventilator and CRRT  Neck: RIJ shiley in place, no JVD  Lungs: coarse BS bilat  Heart: RRR, normal S1 and S2  Abdomen: soft, nontender, nondistended  : chase with approx 150 cc urine  Extremities: 1+ BLE edema    Scheduled Meds:     aspirin, 81 mg, Oral, Daily  cefepime, 2,000 mg, Intravenous, Q8H  chlorhexidine, 15 mL, Mouth/Throat, Q12H  citalopram, 20 mg, Nasogastric, Daily  folic acid, 1 mg, Nasogastric, Daily  heparin (porcine), 5,000 Units, Subcutaneous, Q8H  ipratropium-albuterol, 3 mL, Nebulization, 4x Daily - RT  levothyroxine, 25 mcg, Nasogastric, Q AM  metroNIDAZOLE, 500 mg, Intravenous, Q8H  pantoprazole, 40 mg, Intravenous, Q24H  sodium chloride, 10 mL, Intravenous, Q12H  thiamine (B-1) IV, 200 mg, Intravenous, Q8H   Followed by  [START ON 2024] thiamine, 100 mg, Nasogastric, Daily  [START ON 2024] vitamin D, 50,000 Units, Nasogastric, Q7 Days      IV Meds:   dexmedetomidine, 0.2-1.5 mcg/kg/hr, Last Rate: Stopped (24 2105)  phenylephrine, 0.5-3 mcg/kg/min, Last Rate: Stopped (24 0800)  propofol, 5-50 mcg/kg/min, Last Rate: Stopped (24 0753)  vasopressin, 0.03 Units/min        Results  Reviewed:   I have personally reviewed the results from the time of this admission to 9/22/2024 09:55 EDT     Results from last 7 days   Lab Units 09/22/24 0322 09/21/24  1758 09/21/24 0823 09/21/24 0427 09/20/24  0750 09/20/24  0457 09/19/24  1048 09/19/24  0534   SODIUM mmol/L 135* 135* 135* 138   < > 140   < > 141  141   POTASSIUM mmol/L 3.7 3.8 3.9 3.7   < > 3.0*   < > 3.6  3.6   CHLORIDE mmol/L 104 103 103 105   < > 105   < > 109*  109*   CO2 mmol/L 22.0 21.0* 21.0* 20.2*   < > 23.0   < > 8.0*  8.0*   BUN mg/dL 25* 18 12 14   < > 37*   < > 119*  119*   CREATININE mg/dL 1.37* 1.07 0.64* 0.78   < > 1.63*   < > 5.59*  5.59*   CALCIUM mg/dL 7.7* 7.6* 7.6* 7.4*   < > 7.3*   < > 7.4*  7.4*   BILIRUBIN mg/dL  --   --   --  0.7  --  0.6  --  0.4   ALK PHOS U/L  --   --   --  158*  --  95  --  106   ALT (SGPT) U/L  --   --   --  22  --  12  --  15   AST (SGOT) U/L  --   --   --  78*  --  36  --  28   GLUCOSE mg/dL 130* 116* 95 75   < > 114*   < > 79  79    < > = values in this interval not displayed.     Estimated Creatinine Clearance: 55.4 mL/min (A) (by C-G formula based on SCr of 1.37 mg/dL (H)).  Results from last 7 days   Lab Units 09/22/24 0322 09/21/24 0823 09/21/24  0044   MAGNESIUM mg/dL 2.0 2.2 2.2   PHOSPHORUS mg/dL 3.7 3.5 3.7         Results from last 7 days   Lab Units 09/22/24 0322 09/21/24 0427 09/20/24  1610 09/20/24  0456 09/19/24  1048 09/19/24  0534 09/18/24  1511 09/18/24  1202   WBC 10*3/mm3 18.59* 17.47*  --  14.68*  --  20.92*  --  22.71*   HEMOGLOBIN g/dL 8.1* 7.7* 7.9* 6.6*  --  7.9*   < > 8.1*   PLATELETS 10*3/mm3 79* 80*  --  104* 162 128*  --  139*    < > = values in this interval not displayed.     Results from last 7 days   Lab Units 09/18/24  1511   INR  1.26*       Assessment / Plan     ASSESSMENT:  Olig -> non olig SHANA.  Likely ATN, was on CRRT, stopped yesterday, and now appears to be recovering.  Cr was driven down to 0.6 from the CRRT, so not too concerned about climb  to 1.3 today, as UOP now robust (2.5L/24h), s/p bumex 4mg IV x1 yesterday.  K/bicarb normal (main indication for CRRT was metabolic acidosis, which has resolved).  Ca is 7.7 but corrects to normal for low alb  Acute hypoxemic, hypercapnic respiratory failure.  On the ventilator FIo2 weaned 40%  Vol overload - robust diuresis with bumex 4mg IV x1, will continue at lower dose today.  Has fair amt periph edema, with low alb (2) and 3rd spacing  Septic shock - PNA/UTI, abx per PULM, off pressor  HFrEF, EF 35% by echo 9/18/24  Anemia, transfused on 9/20; hgb up 8.1 today  TCP, PLT dropping, 80K today  AG metabolic acidosis - starvation ketosis suspected, improved with CRRT, and bicarb is stable/normal 22 today off the CRRT  Left olecranon fracture  Hx ETOH abuse     PLAN:  No indication for HD but won't remove shiley yet, perhaps tomorrow  Bumex 2mg IV q8h x 2 doses   KCL 20meq per tube x 2  D/W RN, family at bedside       Grzegorz Juan MD  09/22/24  09:55 EDT    [facility]  [phone]

## 2024-09-22 NOTE — PROGRESS NOTES
LOS: 4 days   Patient Care Team:  Jaquelin Rueda APRN as PCP - General (Nurse Practitioner)    Chief Complaint:  Following for cardiomyopathy, troponin elevation    Interval History:   Trialing off CRRT.  Otherwise remains intubated butRemains off pressors.  Making urine with diuretics.    Objective   Vital Signs  Temp:  [98.2 °F (36.8 °C)-99.9 °F (37.7 °C)] 99.9 °F (37.7 °C)  Heart Rate:  [] 112  Resp:  [18-23] 20  BP: (110-134)/(62-83) 110/66  FiO2 (%):  [40 %-50 %] 40 %    Intake/Output Summary (Last 24 hours) at 9/22/2024 0710  Last data filed at 9/22/2024 0631  Gross per 24 hour   Intake 3340 ml   Output 2480 ml   Net 860 ml       Comfortable NAD, resting in bed on ventilator, off sedation,  PERRL, conjunctivae clear  Neck supple, no JVD or thyromegaly appreciated  S1/S2 tachycardic, regular, no m/r/g  Scattered, coarse respiratory sounds on ventilator.  Abdomen S/NT/ND (+) BS, no HSM appreciated  Extremities warm, no clubbing, cyanosis, no significant lower, 1+ bilateral upper extremity edema.    No significant lower extremity edema no further mottling.  No visible or palpable skin lesions  Intubated and sedated, does not follow commands    Results Review:      Results from last 7 days   Lab Units 09/22/24  0322 09/21/24  1758 09/21/24  0823   SODIUM mmol/L 135* 135* 135*   POTASSIUM mmol/L 3.7 3.8 3.9   CHLORIDE mmol/L 104 103 103   CO2 mmol/L 22.0 21.0* 21.0*   BUN mg/dL 25* 18 12   CREATININE mg/dL 1.37* 1.07 0.64*   GLUCOSE mg/dL 130* 116* 95   CALCIUM mg/dL 7.7* 7.6* 7.6*     Results from last 7 days   Lab Units 09/19/24  0534 09/18/24  0609 09/18/24  0300 09/18/24  0023   CK TOTAL U/L 681* 692*  --   --    HSTROP T ng/L  --   --  600* 665*     Results from last 7 days   Lab Units 09/22/24  0322 09/21/24  0427 09/20/24  1610 09/20/24  0456   WBC 10*3/mm3 18.59* 17.47*  --  14.68*   HEMOGLOBIN g/dL 8.1* 7.7* 7.9* 6.6*   HEMATOCRIT % 23.8* 21.9* 22.7* 19.7*   PLATELETS 10*3/mm3 79* 80*  --  104*      Results from last 7 days   Lab Units 09/18/24  1511   INR  1.26*         Results from last 7 days   Lab Units 09/22/24  0322   MAGNESIUM mg/dL 2.0     Results from last 7 days   Lab Units 09/22/24  0322   TRIGLYCERIDES mg/dL 402*       I reviewed the patient's new clinical results.  I personally viewed and interpreted the patient's EKG/Telemetry data          Medication Review:   cefepime, 2,000 mg, Intravenous, Q8H  chlorhexidine, 15 mL, Mouth/Throat, Q12H  citalopram, 20 mg, Nasogastric, Daily  folic acid, 1 mg, Nasogastric, Daily  heparin (porcine), 5,000 Units, Subcutaneous, Q8H  ipratropium-albuterol, 3 mL, Nebulization, 4x Daily - RT  levothyroxine, 25 mcg, Nasogastric, Q AM  metroNIDAZOLE, 500 mg, Intravenous, Q8H  pantoprazole, 40 mg, Intravenous, Q24H  sodium chloride, 10 mL, Intravenous, Q12H  thiamine (B-1) IV, 200 mg, Intravenous, Q8H   Followed by  [START ON 9/23/2024] thiamine, 100 mg, Nasogastric, Daily  [START ON 9/25/2024] vitamin D, 50,000 Units, Nasogastric, Q7 Days        dexmedetomidine, 0.2-1.5 mcg/kg/hr, Last Rate: Stopped (09/19/24 2105)  phenylephrine, 0.5-3 mcg/kg/min, Last Rate: Stopped (09/21/24 0800)  propofol, 5-50 mcg/kg/min, Last Rate: 35 mcg/kg/min (09/22/24 0631)  vasopressin, 0.03 Units/min        Assessment & Plan       Sepsis    SHANA (acute kidney injury)    Hypokalemia    CAD (coronary artery disease)    Alcohol use disorder    Macrocytic anemia    Troponin elevation.  Unclear etiology.  Cardiomyopathy, ischemic, EF 36 to 40%, prior PCI, remote  Troponins were flat which was thought unlikely indicative of ACS   Unclear if type II NSTEMI, versus nonischemic myocardial injury.   Start aspirin 81  It appears that his cardiac records are more than 10 years old, chronicity of these wall motion abnormalities are unclear.  He may ultimately need a ischemic evaluation depending on prognosis and recovery  Left bundle branch block.  Chronicity unknown.  Awaiting records.  Anemia.     Acute respiratory failure requiring intubation, remains on vent  SHANA.  Nephrology following.  Trialing off CRRT  Sepsis with suspected bacterial pneumonia.  On antibiotics.  Sputum culture without an organism just yet.  UTI, noted with Klebsiella in urine  Alcohol abuse, complicating all the above.  Peripheral vascular disease.  Absent pulses to bilateral lower extremities on my exam.  Lower extremity mottling improved with CRRT, continues to improve currently.  Anion gap metabolic acidosis.  Improved with CRRT  Shock.  Resolving.  Thrombocytopenia.  Stable        Jasper Nguyen MD  09/22/24  07:10 EDT      Part of this note may be an electronic transcription/translation of spoken language to printed text using the Dragon Dictation System.

## 2024-09-22 NOTE — PROGRESS NOTES
"  Daily Progress Note.   [facility] INTENSIVE CARE  9/22/2024    Patient:  Name:  Dioxn Montoya  MRN:  [id number]  1955  68 y.o.  male         CC: Critical care management    Interval History:  Maintains on mechanical ventilation.     FiO2 mechanical ventilation decreased  No fever overnight.  Weaned off of vasopressors.  CRRT stopped yesterday with good urine output 2.5 L  Will not follow commands, propofol recently paused.    Physical Exam:  /59   Pulse 106   Temp 99 °F (37.2 °C)   Resp 20   Ht 172.7 cm (67.99\")   Wt 84.7 kg (186 lb 11.7 oz)   SpO2 94%   BMI 28.40 kg/m²   Body mass index is 28.4 kg/m².    Intake/Output Summary (Last 24 hours) at 9/22/2024 1052  Last data filed at 9/22/2024 0800  Gross per 24 hour   Intake 3140 ml   Output 2450 ml   Net 690 ml   Vent Settings          Resp Rate (Set): 10  Pressure Support (cm H2O): 8 cm H20  FiO2 (%): 41 %  PEEP/CPAP (cm H2O): 5 cm H20    Minute Ventilation (L/min) (Obs): 13 L/min  Resp Rate (Observed) Vent: 20  I:E Ratio (Set): 1:5.67  I:E Ratio (Obs): 1:3.8    PIP Observed (cm H2O): 15 cm H2O  Plateau Pressure (cm H2O): 0 cm H2O  Driving Pressure (cm H2O): -4.2 cm H2O    General appearance: Ill on mechanical ventilation unresponsive  HENT: Positive ET tube  Lungs: CTA, w bilateral air entry mechanical synchronous with mechanical respiration respiratory effort and no intercostal retractions  CV: RRR, no rub   Abdomen: Nonrigid  Extremities: Positive peripheral edema or ext lad  Skin: Warm  Psych/neuro sedated limiting exam will not follow commands    Data Review:  Notable Labs:  Results from last 7 days   Lab Units 09/22/24  0322 09/21/24  0427 09/20/24  1610 09/20/24  0456 09/19/24  1048 09/19/24  0534 09/19/24  0056 09/18/24  1849 09/18/24  1511 09/18/24  1202 09/18/24  0609 09/18/24  0023   WBC 10*3/mm3 18.59* 17.47*  --  14.68*  --  20.92*  --   --   --  22.71* 22.43* 21.21*   HEMOGLOBIN g/dL 8.1* 7.7* 7.9* 6.6*  --  7.9* 7.7* " 8.1*   < > 8.1* 6.4* 5.6*   PLATELETS 10*3/mm3 79* 80*  --  104* 162 128*  --   --   --  139* 124* 137*    < > = values in this interval not displayed.     Results from last 7 days   Lab Units 09/22/24 0322 09/21/24  1758 09/21/24  0823 09/21/24  0427 09/21/24  0044 09/20/24  1610 09/20/24  0750 09/20/24  0457 09/19/24  2356 09/19/24  1753   SODIUM mmol/L 135* 135* 135* 138 137 138 139   < > 140 145   POTASSIUM mmol/L 3.7 3.8 3.9 3.7 3.7 3.7 3.6   < > 3.1* 2.8*   CHLORIDE mmol/L 104 103 103 105 105 106 104   < > 108* 109*   CO2 mmol/L 22.0 21.0* 21.0* 20.2* 21.5* 21.0* 22.9   < > 19.0* 17.0*   BUN mg/dL 25* 18 12 14 14 20 30*   < > 56* 89*   CREATININE mg/dL 1.37* 1.07 0.64* 0.78 0.84 0.98 1.37*   < > 2.22* 3.68*   GLUCOSE mg/dL 130* 116* 95 75 79 70 106*   < > 107* 97   CALCIUM mg/dL 7.7* 7.6* 7.6* 7.4* 7.4* 7.4* 7.4*   < > 7.2* 7.3*   MAGNESIUM mg/dL 2.0  --  2.2  --  2.2 2.1 2.0  --  2.0 1.8   PHOSPHORUS mg/dL 3.7  --  3.5  --  3.7 3.1 2.8  --  3.3 4.7*    < > = values in this interval not displayed.   Estimated Creatinine Clearance: 55.4 mL/min (A) (by C-G formula based on SCr of 1.37 mg/dL (H)).    Results from last 7 days   Lab Units 09/22/24 0322 09/21/24 0427 09/20/24  0457 09/20/24  0456 09/19/24  1048 09/19/24  0534 09/18/24  0609 09/18/24  0300 09/18/24  0145 09/18/24  0023   LDH U/L  --   --   --   --   --  543*  --   --   --   --    AST (SGOT) U/L  --  78* 36  --   --  28   < >  --   --  31   ALT (SGPT) U/L  --  22 12  --   --  15   < >  --   --  19   PROCALCITONIN ng/mL  --   --   --   --   --   --   --   --   --  1.41*   LACTATE mmol/L 0.9 1.2 1.5  --    < >  --    < >  --    < >  --    FERRITIN ng/mL  --   --   --   --   --   --   --  1,835.00*  --   --    PLATELETS 10*3/mm3 79* 80*  --  104*   < > 128*   < >  --   --  137*    < > = values in this interval not displayed.       Results from last 7 days   Lab Units 09/22/24  0317 09/21/24  1039 09/20/24  0535 09/19/24  1307 09/19/24  0817  09/19/24  0637 09/18/24  1355   PH, ARTERIAL pH units 7.352 7.483* 7.600* 7.207* 7.110* 7.051* 7.378   PCO2, ARTERIAL mm Hg 42.8 27.8* 25.1* 34.9* 25.4* 33.5* 17.2*   PO2 ART mm Hg 88.1 64.4* 90.5 108.9* 191.9* 50.7* 60.9*   HCO3 ART mmol/L 23.7 20.8* 24.6 13.8* 8.1* 9.3* 10.1*     Triglycerides 402           Urine Culture - Urine, Straight Cath [[id number]]     (Abnormal)   Urine from Straight Cath    Final result Component Value   Urine Culture >100,000 CFU/mL Klebsiella oxytoca Abnormal          Susceptibility     Klebsiella oxytoca     GABRIELE     Amoxicillin + Clavulanate >=32 Resistant     Ampicillin >=32 Resistant     Ampicillin + Sulbactam >=32 Resistant     Cefazolin >=64 Resistant     Cefepime <=1 Susceptible     Ceftazidime <=1 Susceptible     Ceftriaxone 8 Resistant     Gentamicin <=1 Susceptible     Levofloxacin <=0.12 Susceptible     Nitrofurantoin 32 Susceptible     Piperacillin + Tazobactam >=128 Resistant     Trimethoprim + Sulfamethoxazole <=20 Susceptible                          Imaging:  Reviewed chest images personally from past 3 days    ASSESSMENT  /  PLAN:  Septic shock  Acute hypoxemic respiratory failure mechanical ventilation reviewed  Pneumonia  UTI  Alcohol abuse  Coronary artery disease  NSTEMI with wma - cards evaluating managing  Hypothyroidism  Anemia  SHANA  Possible underlying COPD  PVD  AGMA  Thrombocytopenia  Fracture of arm -ortho conulted, not really a candidate for any operative intervention right now., sling follow up 2 weeks  Heart failure systolic acute decompensated EF 35%      Renal replacement therapy/CRRT/metabolic acidosis per nephrology holding on CRRT today coordinated care with Dr. Yessica Justin challenge with good urine output  Continue to monitor renal function renal replacement needs appreciate nephrology assistance    Recheck ABG showing improvement  Mechanical ventilation reviewed decreased FiO2.  Daily chest x-rays  Improving as volume status  "improves  DuoNebs  SBT in the morning given decreased FiO2 requirements  Off propofol given elevated trace of glycerides.  Precedex  Fentanyl as needed  If needed can utilize Versed drip given his alcohol abuse.    Remains off of vasopressors    Thiamine  Folic acid   Dw pts daughters, they state he was heavy drinker/\"alcoholic\"    Flagyl/cefepime  Klebsiella oxytocin reviewed    Good glycemic control    Sedation vacation ongoing, if no meaningful response ct head, dw rn.    Sick critically ill but improving.    discussed with nursing.  Discussed with family at bedside all questions answered    Total critical care time was 40 minutes, excluding any separately billable procedure time.  Time did not overlap with any other provider.    Electronically signed by Christoph Lizama MD, 09/22/24, 11:06 AM EDT.           "

## 2024-09-22 NOTE — PLAN OF CARE
Goal Outcome Evaluation: Pt remains intubated and sedated. Pt w/d's to pain with sedation paused. VSS through the shift. Adequate UOP (see flowsheet). AM unremarkable (see labs). CHG bath completed and documented. Family at the bedside and updated on care. No other events to note.

## 2024-09-22 NOTE — PLAN OF CARE
Goal Outcome Evaluation:  Plan of Care Reviewed With: family        Progress: no change  Outcome Evaluation: Pt has been off of all sedation 0830. Pt difficult to arouse, only wincing facially to pain. Head CT performed after innability to arouse throughout th day, results negative per md report. Sinus Tach, PRN ativan and fentanyl given. Patient had 1.3L UOP. Skin noted as moderately edematous. VSS.

## 2024-09-23 NOTE — PROGRESS NOTES
"CC: Cardiomyopathy    Interval History: No any acute events overnight      Vital Signs  Temp:  [90.7 °F (32.6 °C)-100.8 °F (38.2 °C)] 100.8 °F (38.2 °C)  Heart Rate:  [] 109  Resp:  [20-28] 28  BP: ()/(46-88) 121/74  FiO2 (%):  [40 %-100 %] 41 %    Intake/Output Summary (Last 24 hours) at 9/23/2024 0755  Last data filed at 9/23/2024 0502  Gross per 24 hour   Intake 2693 ml   Output 4125 ml   Net -1432 ml     Flowsheet Rows      Flowsheet Row First Filed Value   Admission Height 172.7 cm (68\") Documented at 09/17/2024 2341   Admission Weight 78.5 kg (173 lb) Documented at 09/17/2024 2341            PHYSICAL EXAM:  General: Intubated on mechanical ventilator  Resp:NL Rate, symmetric chest expansion,unlabored, clear  CV:NL rate and rhythm, NL PMI, NL S1 and S2, systolic  Murmur, no gallop, no rub, No JVD.   ABD:Nl sounds, no masses or tenderness, nondistended, no guarding or rebound  Neuro: Sedated  Extr: No deformity      Results Review:    Results from last 7 days   Lab Units 09/23/24  0348   SODIUM mmol/L 134*   POTASSIUM mmol/L 3.5   CHLORIDE mmol/L 101   CO2 mmol/L 23.0   BUN mg/dL 44*   CREATININE mg/dL 2.12*   GLUCOSE mg/dL 119*   CALCIUM mg/dL 7.8*     Results from last 7 days   Lab Units 09/19/24  0534 09/18/24  0609 09/18/24  0300 09/18/24  0023   CK TOTAL U/L 681* 692*  --   --    HSTROP T ng/L  --   --  600* 665*     Results from last 7 days   Lab Units 09/23/24  0348   WBC 10*3/mm3 18.12*   HEMOGLOBIN g/dL 7.5*   HEMATOCRIT % 22.7*   PLATELETS 10*3/mm3 73*     Results from last 7 days   Lab Units 09/18/24  1511   INR  1.26*         Results from last 7 days   Lab Units 09/23/24  0348   MAGNESIUM mg/dL 1.7     Results from last 7 days   Lab Units 09/22/24  0322   TRIGLYCERIDES mg/dL 402*     I reviewed the patient's new clinical results.  I personally viewed and interpreted the patient's EKG/Telemetry data        Medication Review:   Meds reviewed    dexmedetomidine, 0.2-1.5 mcg/kg/hr, Last Rate: " 1 mcg/kg/hr (09/23/24 7237)  phenylephrine, 0.5-3 mcg/kg/min, Last Rate: 0.5 mcg/kg/min (09/23/24 6684)  vasopressin, 0.03 Units/min        Elevated troponin from underlying physician distress: Respiratory failure, severe sepsis  Cardiomyopathy, ischemic, EF of about 30%-history of PCI  Left bundle branch block.  Chronicity unknown.  Awaiting records.  Anemia.    Acute respiratory failure requiring intubatio on mechanical ventilator  SHANA.  Nephrology following.  Off CRRT  Sepsis with suspected bacterial pneumonia.  On antibiotics.  Sputum culture without an organism just yet.  UTI, noted with Klebsiella in urine  Alcohol abuse, complicating all the above.  Peripheral vascular disease.  Anion gap metabolic acidosis.  Resolved  Shock.  Resolved  Thrombocytopenia.  Stable     No acute events overnight.  Not on any pressor currently  Has been off CRRT-fair urine output on IV Bumex - nephrology following   I will try him on low dose metoprolol because of low BP   I have discussed patient with his nurse and family at  bedside         Yousif Puga MD  09/23/24  07:55 EDT

## 2024-09-23 NOTE — PLAN OF CARE
Goal Outcome Evaluation: Pt more agitated and restless requiring Precedex for vent compliance. VSS, however pt became hypotensive after initiation of Precedex. Bryan restarted for BP support (see MAR/flowsheet). Adequate UOP, (see I&O's). Potassium replaced (see labs). CHG bath completed and documented. Family at the bedside.

## 2024-09-23 NOTE — PLAN OF CARE
Goal Outcome Evaluation:   Pt remains OETT to vent, O2 requirements have increased this evening.  Currently on 80%Fi02.  Pt has been febrile with t max of 104.5.  Rectal probe inserted to confirm accuracy of bladder temp.  ID consulted, unable to obtain sputum culture as pt has not had any secretions with suctioning.  Lab attempting to obtain blood cultures. (very difficult stick.)  CT of chest, abd, and pelvis to see if there is a source for temp.  See reports.  Tylenol has not been effective with managing temps, ice packs applied.  Dr. Rg notified and ordered vanc.  Pt has had family at bedside during this shift.

## 2024-09-23 NOTE — CONSULTS
Referring Provider: Mark Bravo MD      Subjective   History of present illness: 68-year-old with a history of coronary artery disease, alcohol abuse admitted with 3 days of redness on 9/17/2024.  He had a very protracted course requiring intubation and was found to be in severe acute kidney injury requiring CRRT.  Currently off hemodialysis.  Course has now been complicated by fevers.  Initially he was treated with UTI and possible pneumonia with ceftriaxone and metronidazole but ceftriaxone has been switched now to cefepime yesterday giving ongoing fevers.  These are as high as 103.6.    Physical Exam:   Vital Signs   Temp:  [90.7 °F (32.6 °C)-103.6 °F (39.8 °C)] 103.6 °F (39.8 °C)  Heart Rate:  [] 93  Resp:  [20-28] 24  BP: ()/(46-94) 100/56  FiO2 (%):  [40 %-100 %] 51 %  GENERAL: Ill-appearing  HEENT: Oropharynx with ET tube in place. Hearing is unable to assess  EYES: . No conjunctival injection. No lid lag.   GI: Soft, nontender, nondistended. No appreciable organomegaly.   SKIN: Warm and dry without cutaneous eruptions   PSYCHIATRIC: Sedated, no purposeful movements over sedation for me  Edematous    Results Review:  WBC 19.1, hgb 7.5, plt 73  Cr 2.12      9/18 Ucx >100K Klebsiella oxytoca  9/18 Bcx 2/2 neg  9/18 rpp, strep/legionella neg  9/19 MRSA PCR neg  9/19 ETT cx c albicans     CXR, independently interpreted: left lung opacity    A/p  Sepsis  Acute kidney injury  Acute hypoxic respiratory failure  Alcohol abuse disorder with withdrawal    While patient with leukocytosis and fever concerning for sepsis.  Most obvious source appears to be a left sided pneumonia, but while differential including line infection or intra-abdominal process.  CT abdomen pelvis has been ordered and we will also check a CT chest.  No significant secretions to send for culture.  Continue renally dosed cefepime 2 g IV every 8 hours.  Will follow-up blood culture results.  Check LFTs in AM.                 Thank you  for this consult.  We will continue to follow along and tailor antibiotics as the patient's clinical course evolves.

## 2024-09-23 NOTE — CONSULTS
"Nutrition Services    Patient Name:  Dixon Montoya  YOB: 1955  MRN: [id number]  Admit Date:  9/17/2024    Assessment Date:  09/23/24    Summary: Follow up  Pt remains intubated and sedated on precedex. CRRT discontinued 9/21. Has been diuresing well, 4.1 L urine out in the past 24 hrs per nephrology note. Tf running at goal rate and pt appears to be tolerating well.     RECS:  Please discontinue Peptamen Intense VHP and Initiate Peptamen AF at 50 ml/hr and advance 10 ml q 4 hrs to goal rate of 70 ml/hr. Goal rate to provide 1848 kcals, 117 g/protein, and 1248 ml free water. Flush 30 ml free water q 6 hrs.     The above end goal rate is for 22 hrs/day to assume interruptions for ADLs. Water flushes adjusted based on clinical picture + Rx flushes/IV fluids     RD to follow     CLINICAL NUTRITION ASSESSMENT      Reason for Assessment Follow-up Protocol     Diagnosis/Problem   generalized weakness. Pt has a hx of HLD, HTN, ETOH abusem COPD, CAD, hepatic steatosis.   Medical/Surgical History Past Medical History:   Diagnosis Date    Anxiety     Depression     Hyperlipidemia     Hypertension        History reviewed. No pertinent surgical history.     Anthropometrics        Current Height  Current Weight  BMI kg/m2 Height: 172.7 cm (67.99\")  Weight: 84.2 kg (185 lb 10 oz) (09/23/24 0502)  Body mass index is 28.23 kg/m².   Adjusted BMI (if applicable)    BMI Category Overweight (25 - 29.9)   Ideal Body Weight (IBW) 150 lbs   Usual Body Weight (UBW) 170 lbs   Weight Trend Stable   Weight History Wt Readings from Last 30 Encounters:   09/23/24 0502 84.2 kg (185 lb 10 oz)   09/21/24 0600 84.7 kg (186 lb 11.7 oz)   09/20/24 0400 80.9 kg (178 lb 5.6 oz)   09/19/24 0400 75.2 kg (165 lb 12.6 oz)   09/18/24 0952 78.5 kg (173 lb)   09/17/24 2341 78.5 kg (173 lb)        Estimated/Assessed Needs        Energy Requirements    Weight for Calculation 68 kg IBW   Method for Estimation  25-30 kcal/kg   EST Needs (kcal/day) " [id number]       Protein Requirements    Weight for Calculation 68 kg IBW   EST Protein Needs (g/kg) 1.2 - 1.5 gm/kg   EST Daily Needs (g/day)        Fluid Requirements     Method for Estimation 1 mL/kcal    Estimated Needs (mL/day)        Fluid Deficit    Current Na Level (mEq/L)    Desired Na Level (mEq/L)    Estimated Fluid Deficit (L)       Labs       Pertinent Labs    Results from last 7 days   Lab Units 09/23/24 0348 09/22/24 0322 09/21/24  1758 09/21/24  0823 09/21/24  0427 09/20/24  0750 09/20/24  0457 09/19/24  1048 09/19/24  0534   SODIUM mmol/L 134* 135* 135*   < > 138   < > 140   < > 141  141   POTASSIUM mmol/L 3.5 3.7 3.8   < > 3.7   < > 3.0*   < > 3.6  3.6   CHLORIDE mmol/L 101 104 103   < > 105   < > 105   < > 109*  109*   CO2 mmol/L 23.0 22.0 21.0*   < > 20.2*   < > 23.0   < > 8.0*  8.0*   BUN mg/dL 44* 25* 18   < > 14   < > 37*   < > 119*  119*   CREATININE mg/dL 2.12* 1.37* 1.07   < > 0.78   < > 1.63*   < > 5.59*  5.59*   CALCIUM mg/dL 7.8* 7.7* 7.6*   < > 7.4*   < > 7.3*   < > 7.4*  7.4*   BILIRUBIN mg/dL  --   --   --   --  0.7  --  0.6  --  0.4   ALK PHOS U/L  --   --   --   --  158*  --  95  --  106   ALT (SGPT) U/L  --   --   --   --  22  --  12  --  15   AST (SGOT) U/L  --   --   --   --  78*  --  36  --  28   GLUCOSE mg/dL 119* 130* 116*   < > 75   < > 114*   < > 79  79    < > = values in this interval not displayed.     Results from last 7 days   Lab Units 09/23/24 0348 09/22/24 0322 09/21/24  0823   MAGNESIUM mg/dL 1.7 2.0 2.2   PHOSPHORUS mg/dL 3.6 3.7 3.5   HEMOGLOBIN g/dL 7.5* 8.1*  --    HEMATOCRIT % 22.7* 23.8*  --    WBC 10*3/mm3 18.12* 18.59*  --    TRIGLYCERIDES mg/dL  --  402*  --    ALBUMIN g/dL 1.7* 2.0* 2.0*     Results from last 7 days   Lab Units 09/23/24  0348 09/22/24  0322 09/21/24  0427 09/20/24  0456 09/19/24  1048 09/19/24  0534 09/18/24  1511   INR   --   --   --   --   --   --  1.26*   PLATELETS 10*3/mm3 73* 79* 80* 104* 162   < >  --     < > = values  in this interval not displayed.     COVID19   Date Value Ref Range Status   09/18/2024 Not Detected Not Detected - Ref. Range Final     Lab Results   Component Value Date    HGBA1C 4.9 07/16/2024          Medications           Scheduled Medications aspirin, 81 mg, Oral, Daily  atorvastatin, 20 mg, Oral, Nightly  cefepime, 2,000 mg, Intravenous, Q12H  chlorhexidine, 15 mL, Mouth/Throat, Q12H  citalopram, 20 mg, Nasogastric, Daily  folic acid, 1 mg, Nasogastric, Daily  heparin (porcine), 5,000 Units, Subcutaneous, Q8H  ipratropium-albuterol, 3 mL, Nebulization, 4x Daily - RT  levothyroxine, 25 mcg, Nasogastric, Q AM  metoprolol tartrate, 12.5 mg, Oral, Q12H  metroNIDAZOLE, 500 mg, Intravenous, Q8H  pantoprazole, 40 mg, Intravenous, Q24H  sodium chloride, 10 mL, Intravenous, Q12H  thiamine, 100 mg, Nasogastric, Daily  [START ON 9/25/2024] vitamin D, 50,000 Units, Nasogastric, Q7 Days       Infusions dexmedetomidine, 0.2-1.5 mcg/kg/hr, Last Rate: 1 mcg/kg/hr (09/23/24 1411)  phenylephrine, 0.5-3 mcg/kg/min, Last Rate: Stopped (09/23/24 0823)       PRN Medications   acetaminophen **OR** acetaminophen **OR** acetaminophen    albuterol    aluminum-magnesium hydroxide-simethicone    senna-docusate sodium **AND** polyethylene glycol **AND** bisacodyl **AND** bisacodyl    Calcium Replacement - Follow Nurse / BPA Driven Protocol    dextrose    dextrose    fentaNYL citrate (PF)    glucagon (human recombinant)    heparin (porcine)    heparin    Magnesium Standard Dose Replacement - Follow Nurse / BPA Driven Protocol    ondansetron ODT **OR** ondansetron    Phosphorus Replacement - Follow Nurse / BPA Driven Protocol    sodium chloride     Physical Findings          General Findings ventilator support   Oral/Mouth Cavity dental caries   Edema  2+ (mild)   Gastrointestinal last bowel movement: 9/21   Skin  skin intact   Tubes/Drains/Lines Cortrak, dialysis catheter, NG tube, bridle in place   NFPE Not indicated at this time      Current Nutrition Orders & Evaluation of Intake       Oral Nutrition     Food Allergies NKFA   Current PO Diet NPO Diet NPO Type: Tube Feeding   Supplement n/a   PO Evaluation     % PO Intake NPO    Factors Affecting Intake: Other: Intubated      Enteral Nutrition     Enteral Route NG    TF Delivery Method Continuous    Propofol Rate/Kcal     Current TF Order/Rate  Peptamen Intense VHP @ 50 mL/hr    TF Goal Rate 50 mL/hr    Current Water Flush 50 mL Q 1 hr    Modular None    TF Residual  no or minimal residual    TF Tolerance tolerating    TF Observation Verified correct TF and water flush infusing per orders     PES STATEMENT / NUTRITION DIAGNOSIS      Nutrition Dx Problem  Problem: Needs Alternative Composition  Etiology: Medical Diagnosis - generalized weakness. Pt has a hx of HLD, HTN, ETOH abusem COPD, CAD, hepatic steatosis.    Signs/Symptoms: Report/Observation   --  NUTRITION INTERVENTION / PLAN OF CARE      Intervention Goal(s) Maintain nutrition status, Establish goals of care, Meet estimated needs, Initiate TF/PN, and Tolerate TF/PN at goal         RD Intervention/Action Await initiation of EN/PN, Continue to monitor, Care plan reviewed, and Recommend/order: EN         Prescription/Orders:       PO Diet       Supplements       Enteral Nutrition    Enteral Prescription:     Enteral Route NG    TF Delivery Method Continuous    Enteral Product Peptamen AF    Modular None    Propofol Rate/Kcal 22.6 ml/hr 596 kcals    TF Start Rate  50 mL/hr    TF Goal Rate  70 mL/hr    Free Water Flush 30 mL Q 6 hr    Provision at Goal:          Calories 1848 kcal meets 100% needs         Protein  117 gm protein, meets 100% needs         Fluid (mL) 1248 mL free water + 120 mL in flushes         Parenteral Nutrition    New Prescription Ordered? Yes   --      Monitor/Evaluation Per protocol, Pertinent labs, EN delivery/tolerance, Weight, Skin status, GI status, Symptoms, POC/GOC, Swallow function   Discharge Plan/Needs No  discharge needs identified at this time     RD to follow per protocol.      Electronically signed by:  Mayo Pérez RDN, LD  09/23/24 15:34 EDT

## 2024-09-23 NOTE — PROGRESS NOTES
"  Daily Progress Note.   [facility] INTENSIVE CARE  9/23/2024    Patient:  Name:  Dixon Montoya  MRN:  [id number]  1955  68 y.o.  male         CC: Critical care management    Interval History:  Maintains on mechanical ventilation.     Febrile  Unable to give a reliable history to become agitated/moving with sedation vacation yesterday    Physical Exam:  /94   Pulse (!) 122   Temp (!) 100.9 °F (38.3 °C)   Resp 28   Ht 172.7 cm (67.99\")   Wt 84.2 kg (185 lb 10 oz)   SpO2 (!) 88%   BMI 28.23 kg/m²   Body mass index is 28.23 kg/m².    Intake/Output Summary (Last 24 hours) at 9/23/2024 0820  Last data filed at 9/23/2024 0502  Gross per 24 hour   Intake 2693 ml   Output 4125 ml   Net -1432 ml   Vent Settings          Resp Rate (Set): 10  Pressure Support (cm H2O): 8 cm H20  FiO2 (%): 41 %  PEEP/CPAP (cm H2O): 5 cm H20    Minute Ventilation (L/min) (Obs): 14.7 L/min  Resp Rate (Observed) Vent: 27  I:E Ratio (Set): 1:5.67  I:E Ratio (Obs): 1:1.8    PIP Observed (cm H2O): 24 cm H2O  Plateau Pressure (cm H2O): 0 cm H2O  Driving Pressure (cm H2O): -3.4 cm H2O    General appearance: Ill on mechanical ventilation unresponsive  HENT: Positive ET tube  Lungs:bilateral air entry mechanical synchronous with mechanical respiration respiratory effort and no intercostal retractions  CV: RRR, no rub   Abdomen: Nonrigid  Extremities: Positive peripheral edema   Skin: Warm  Psych/neuro sedated limiting exam will not follow commands    Data Review:  Notable Labs:  Results from last 7 days   Lab Units 09/23/24  0348 09/22/24  0322 09/21/24  0427 09/20/24  1610 09/20/24  0456 09/19/24  1048 09/19/24  0534 09/19/24  0056 09/18/24  1511 09/18/24  1202 09/18/24  0609   WBC 10*3/mm3 18.12* 18.59* 17.47*  --  14.68*  --  20.92*  --   --  22.71* 22.43*   HEMOGLOBIN g/dL 7.5* 8.1* 7.7* 7.9* 6.6*  --  7.9* 7.7*   < > 8.1* 6.4*   PLATELETS 10*3/mm3 73* 79* 80*  --  104* 162 128*  --   --  139* 124*    < > = values in " this interval not displayed.     Results from last 7 days   Lab Units 09/23/24  0348 09/22/24  0322 09/21/24  1758 09/21/24  0823 09/21/24  0427 09/21/24  0044 09/20/24  1610 09/20/24  0750 09/20/24  0457 09/19/24  2356   SODIUM mmol/L 134* 135* 135* 135* 138 137 138 139   < > 140   POTASSIUM mmol/L 3.5 3.7 3.8 3.9 3.7 3.7 3.7 3.6   < > 3.1*   CHLORIDE mmol/L 101 104 103 103 105 105 106 104   < > 108*   CO2 mmol/L 23.0 22.0 21.0* 21.0* 20.2* 21.5* 21.0* 22.9   < > 19.0*   BUN mg/dL 44* 25* 18 12 14 14 20 30*   < > 56*   CREATININE mg/dL 2.12* 1.37* 1.07 0.64* 0.78 0.84 0.98 1.37*   < > 2.22*   GLUCOSE mg/dL 119* 130* 116* 95 75 79 70 106*   < > 107*   CALCIUM mg/dL 7.8* 7.7* 7.6* 7.6* 7.4* 7.4* 7.4* 7.4*   < > 7.2*   MAGNESIUM mg/dL 1.7 2.0  --  2.2  --  2.2 2.1 2.0  --  2.0   PHOSPHORUS mg/dL 3.6 3.7  --  3.5  --  3.7 3.1 2.8  --  3.3    < > = values in this interval not displayed.   Estimated Creatinine Clearance: 35.7 mL/min (A) (by C-G formula based on SCr of 2.12 mg/dL (H)).    Results from last 7 days   Lab Units 09/23/24 0348 09/22/24  0322 09/21/24  0427 09/20/24  0457 09/19/24  1048 09/19/24  0534 09/18/24  0609 09/18/24  0300 09/18/24  0145 09/18/24  0023   LDH U/L  --   --   --   --   --  543*  --   --   --   --    AST (SGOT) U/L  --   --  78* 36  --  28   < >  --   --  31   ALT (SGPT) U/L  --   --  22 12  --  15   < >  --   --  19   PROCALCITONIN ng/mL  --   --   --   --   --   --   --   --   --  1.41*   LACTATE mmol/L  --  0.9 1.2 1.5   < >  --    < >  --    < >  --    FERRITIN ng/mL  --   --   --   --   --   --   --  1,835.00*  --   --    PLATELETS 10*3/mm3 73* 79* 80*  --    < > 128*   < >  --   --  137*    < > = values in this interval not displayed.       Results from last 7 days   Lab Units 09/23/24  0435 09/22/24  0317 09/21/24  1039 09/20/24  0535 09/19/24  1307 09/19/24  0817 09/19/24  0637 09/18/24  1355   PH, ARTERIAL pH units 7.469* 7.352 7.483* 7.600* 7.207* 7.110* 7.051* 7.378   PCO2,  ARTERIAL mm Hg 33.6* 42.8 27.8* 25.1* 34.9* 25.4* 33.5* 17.2*   PO2 ART mm Hg 79.9* 88.1 64.4* 90.5 108.9* 191.9* 50.7* 60.9*   HCO3 ART mmol/L 24.4 23.7 20.8* 24.6 13.8* 8.1* 9.3* 10.1*     Triglycerides 402           Urine Culture - Urine, Straight Cath [[id number]]     (Abnormal)   Urine from Straight Cath    Final result Component Value   Urine Culture >100,000 CFU/mL Klebsiella oxytoca Abnormal          Susceptibility     Klebsiella oxytoca     GABRIELE     Amoxicillin + Clavulanate >=32 Resistant     Ampicillin >=32 Resistant     Ampicillin + Sulbactam >=32 Resistant     Cefazolin >=64 Resistant     Cefepime <=1 Susceptible     Ceftazidime <=1 Susceptible     Ceftriaxone 8 Resistant     Gentamicin <=1 Susceptible     Levofloxacin <=0.12 Susceptible     Nitrofurantoin 32 Susceptible     Piperacillin + Tazobactam >=128 Resistant     Trimethoprim + Sulfamethoxazole <=20 Susceptible                          Imaging:  Reviewed chest images personally from past 3 days    ASSESSMENT  /  PLAN:  Septic shock  Acute hypoxemic respiratory failure mechanical ventilation reviewed  Pneumonia  UTI  Alcohol abuse  Coronary artery disease  NSTEMI with wma - cards evaluating managing  Hypothyroidism  Anemia  SHANA  Possible underlying COPD  PVD  AGMA  Thrombocytopenia - insetting of sepsis and etoh intake, continue to trend  Fracture of arm -ortho conulted, not really a candidate for any operative intervention right now., sling follow up 2 weeks  Heart failure systolic acute decompensated EF 35%    Continue to monitor renal function renal replacement needs appreciate nephrology assistance      Serial ABG chest x-rays mechanical ventilation reviewed adjustments as appropriate.  Oxygenation limiting SBT's  DuoNebs  Off propofol given elevated trace of glycerides.  Precedex  Fentanyl as needed  If needed can utilize Versed drip given his alcohol abuse.    Intermittent vasopressors    Thiamine  Folic acid   Dw pts daughters, they state he  "was heavy drinker/\"alcoholic\"    Flagyl/cefepime  Klebsiella oxytocin reviewed  Fever - repeat bcs, resp culture  Monitor trend  Cxr noted.    Good glycemic control    Plan of care and patient course was reviewed with multidisciplinary team in morning rounds.      Discussed with family at bedside all questions answered    Total critical care time was 40 minutes, excluding any separately billable procedure time.  Time did not overlap with any other provider.     Electronically signed by Christoph Lizama MD, 09/23/24, 1:11 PM EDT.    Addendum  Persistent fever  Will ask infectious disease opinion  Stat CT abdomen pelvis  Not enough sputum to obtain microbiology sample  Blood culture sent.  Discussed with nursing.  Electronically signed by Christoph Lizama MD, 09/23/24, 2:52 PM EDT.      "

## 2024-09-23 NOTE — SIGNIFICANT NOTE
09/23/24 0746   B = Both Spontaneous Awakening and Breathing Trials   Was patient receiving mechanical ventilation? Yes   Safety Screen Spontaneous Breathing Trial (SBT)  Proceed with SBT - No exclusion criteria met   Spontaneous Breathing Trial (SBT) Outcome Respiratory rate greater than 35/min - SBT Failure

## 2024-09-23 NOTE — PROGRESS NOTES
[facility] Progress Note      Patient Name: Dixon Montoya  : 1955  MRN: [id number]  Primary Care Physician:  Jaquelin Rueda APRN  Date of admission: 2024    Subjective     Interval History:   F/u SHANA    Review of Systems:   Been off CRRT.  Urine output has been excellent around 4.1 L of urine last 24 hours.  Febrile overnight.  Blood pressure has been soft and  pressors  has been restarted  Objective     Vitals:   Temp:  [90.7 °F (32.6 °C)-100.9 °F (38.3 °C)] 100.9 °F (38.3 °C)  Heart Rate:  [] 114  Resp:  [20-28] 28  BP: ()/(46-94) 128/72  Flow (L/min):  [10] 10  FiO2 (%):  [40 %-100 %] 41 %    Intake/Output Summary (Last 24 hours) at 2024 0842  Last data filed at 2024 0502  Gross per 24 hour   Intake 2693 ml   Output 4125 ml   Net -1432 ml       Physical Exam:    General Appearance: frail WM sedated on ventilator and CRRT  Neck: RIJ shiley in place, no JVD  Lungs: coarse BS bilat  Heart: RRR, normal S1 and S2  Abdomen: soft, nontender, nondistended  : chase with approx 150 cc urine  Extremities: 1+ BLE edema    Scheduled Meds:     aspirin, 81 mg, Oral, Daily  cefepime, 2,000 mg, Intravenous, Q12H  chlorhexidine, 15 mL, Mouth/Throat, Q12H  citalopram, 20 mg, Nasogastric, Daily  folic acid, 1 mg, Nasogastric, Daily  heparin (porcine), 5,000 Units, Subcutaneous, Q8H  ipratropium-albuterol, 3 mL, Nebulization, 4x Daily - RT  levothyroxine, 25 mcg, Nasogastric, Q AM  metroNIDAZOLE, 500 mg, Intravenous, Q8H  pantoprazole, 40 mg, Intravenous, Q24H  sodium chloride, 10 mL, Intravenous, Q12H  thiamine, 100 mg, Nasogastric, Daily  [START ON 2024] vitamin D, 50,000 Units, Nasogastric, Q7 Days      IV Meds:   dexmedetomidine, 0.2-1.5 mcg/kg/hr, Last Rate: 1 mcg/kg/hr (24 05)  phenylephrine, 0.5-3 mcg/kg/min, Last Rate: Stopped (24)  vasopressin, 0.03 Units/min        Results Reviewed:   I have personally reviewed the results from the  time of this admission to 9/23/2024 08:42 EDT     Results from last 7 days   Lab Units 09/23/24  0348 09/22/24  0322 09/21/24  1758 09/21/24  0823 09/21/24  0427 09/20/24  0750 09/20/24  0457 09/19/24  1048 09/19/24  0534   SODIUM mmol/L 134* 135* 135*   < > 138   < > 140   < > 141  141   POTASSIUM mmol/L 3.5 3.7 3.8   < > 3.7   < > 3.0*   < > 3.6  3.6   CHLORIDE mmol/L 101 104 103   < > 105   < > 105   < > 109*  109*   CO2 mmol/L 23.0 22.0 21.0*   < > 20.2*   < > 23.0   < > 8.0*  8.0*   BUN mg/dL 44* 25* 18   < > 14   < > 37*   < > 119*  119*   CREATININE mg/dL 2.12* 1.37* 1.07   < > 0.78   < > 1.63*   < > 5.59*  5.59*   CALCIUM mg/dL 7.8* 7.7* 7.6*   < > 7.4*   < > 7.3*   < > 7.4*  7.4*   BILIRUBIN mg/dL  --   --   --   --  0.7  --  0.6  --  0.4   ALK PHOS U/L  --   --   --   --  158*  --  95  --  106   ALT (SGPT) U/L  --   --   --   --  22  --  12  --  15   AST (SGOT) U/L  --   --   --   --  78*  --  36  --  28   GLUCOSE mg/dL 119* 130* 116*   < > 75   < > 114*   < > 79  79    < > = values in this interval not displayed.     Estimated Creatinine Clearance: 35.7 mL/min (A) (by C-G formula based on SCr of 2.12 mg/dL (H)).  Results from last 7 days   Lab Units 09/23/24 0348 09/22/24 0322 09/21/24 0823   MAGNESIUM mg/dL 1.7 2.0 2.2   PHOSPHORUS mg/dL 3.6 3.7 3.5         Results from last 7 days   Lab Units 09/23/24  0348 09/22/24  0322 09/21/24  0427 09/20/24  1610 09/20/24  0456 09/19/24  1048 09/19/24  0534   WBC 10*3/mm3 18.12* 18.59* 17.47*  --  14.68*  --  20.92*   HEMOGLOBIN g/dL 7.5* 8.1* 7.7* 7.9* 6.6*  --  7.9*   PLATELETS 10*3/mm3 73* 79* 80*  --  104* 162 128*     Results from last 7 days   Lab Units 09/18/24  1511   INR  1.26*       Assessment / Plan     ASSESSMENT:  Olig -> non olig SHANA.  Likely ATN, was on CRRt that was stopped on 9/21/2023., as UOP now robust (2.5L/24h),   Acute hypoxemic, hypercapnic respiratory failure.  On the ventilator FIo2 weaned 40%  Vol overload -excellent diuresis  with Bumex 2 mg IV 3 times daily  Septic shock - PNA/UTI, abx per PULM, off pressor  HFrEF, EF 35% by echo 9/18/24  Anemia, transfused on 9/20; hgb up 8.1 today  TCP, PLT dropping, 80K today  AG metabolic acidosis - starvation ketosis suspected, improved with CRRT, and bicarb is stable/normal 23 today off the CRRT  Left olecranon fracture  Hx ETOH abuse   Mild hyponatremia    PLAN:  Uremic toxins are up again today but urine output has been excellent.  No indication for dialysis at the time being.  Will hold diuretic therapy at this time   Given small bolus of IV fluid given hypotension.  Labs in AM       Silva Bush MD  09/23/24  08:42 EDT    [facility]  [phone]

## 2024-09-23 NOTE — CONSULTS
General Surgery Consultation    Consulting Physician: Angeline Dodd MD  Referring: Christoph Lizama MD     Reason for consultation:   Acute cholecystitis    CC:   Septic shock, fever, possible cholecystitis    HPI:   The patient is a 68 y.o. male with history of CAD status post coronary stents with ischemic cardiomyopathy with EF of 30%, alcohol abuse, possible COPD with tobacco abuse, who presented to the hospital with generalized weakness and dizziness.  He was found to have multifactorial SHANA, sepsis with bacterial pneumonia and Klebsiella UTI, and subsequently developed respiratory failure for which he was intubated.  He developed renal failure and underwent CRRT which has been stopped.  He is pancytopenic.  Today he developed high fevers and underwent CT to evaluate for source of infection.  CT abdomen pelvis demonstrated mildly distended gallbladder with stranding and inflammation in the right upper quadrant suspicious for cholecystitis versus less likely focal hepatic flexure colitis, there was no biliary ductal dilation.  General surgery was consulted for further evaluation.  The patient's history is provided by his family members, nursing staff, and chart review as the patient is intubated and ventilated.    Family states patient has alcohol abuse and has had poor nutrition over the past several weeks.  They state that he was supposed to have an abdominal ultrasound last week but he canceled it.  They note that he is noncompliant with his daily medications.  They deny any known history of cirrhosis or hepatitis.     Past Medical History:  Alcoholism  Anxiety  Depression  Hypertension  Hyperlipidemia  CAD s/p PCI  Ischemic cardiomyopathy  COPD with tobacco abuse    Past Surgical History:  No known prior abdominal surgeries per family  5 coronary stents placed in 2005    Medications:  Medications Prior to Admission   Medication Sig Dispense Refill Last Dose    lisinopril (PRINIVIL,ZESTRIL) 30 MG  tablet Take 1 tablet by mouth Daily.       loratadine (CLARITIN) 10 MG tablet Take 1 tablet by mouth Daily.       metoprolol succinate XL (TOPROL-XL) 25 MG 24 hr tablet Take 1 tablet by mouth Daily.       potassium chloride (KLOR-CON M10) 10 MEQ CR tablet Take 1 tablet by mouth.       rosuvastatin (CRESTOR) 40 MG tablet Take 1 tablet by mouth Daily.       aspirin 81 MG EC tablet Take 1 tablet by mouth Daily.       citalopram (CeleXA) 40 MG tablet Take 1 tablet by mouth Daily.       folic acid (FOLVITE) 1 MG tablet Take 1 tablet by mouth Daily.          Current Facility-Administered Medications:     acetaminophen (TYLENOL) tablet 650 mg, 650 mg, Oral, Q4H PRN **OR** acetaminophen (TYLENOL) 160 MG/5ML oral solution 650 mg, 650 mg, Oral, Q4H PRN, 650 mg at 09/23/24 1738 **OR** acetaminophen (TYLENOL) suppository 650 mg, 650 mg, Rectal, Q4H PRN, Lilia Ramos, APRN    albuterol (PROVENTIL) nebulizer solution 0.083% 2.5 mg/3mL, 2.5 mg, Nebulization, Q6H PRN, Ricki Flood MD    aluminum-magnesium hydroxide-simethicone (MAALOX MAX) 400-400-40 MG/5ML suspension 15 mL, 15 mL, Nasogastric, Q6H PRN, Mike Lizama MD    aspirin chewable tablet 81 mg, 81 mg, Oral, Daily, Jasper Nguyen MD, 81 mg at 09/23/24 0810    atorvastatin (LIPITOR) tablet 20 mg, 20 mg, Oral, Nightly, Yousif Puga MD    sennosides-docusate (PERICOLACE) 8.6-50 MG per tablet 2 tablet, 2 tablet, Oral, BID PRN **AND** polyethylene glycol (MIRALAX) packet 17 g, 17 g, Oral, Daily PRN **AND** bisacodyl (DULCOLAX) EC tablet 5 mg, 5 mg, Oral, Daily PRN **AND** bisacodyl (DULCOLAX) suppository 10 mg, 10 mg, Rectal, Daily PRN, Lilia Ramos, APRYOLI    Calcium Replacement - Follow Nurse / BPA Driven Protocol, , Does not apply, PRN, Layla Payne MD    cefepime 2000 mg IVPB in 100 mL NS (MBP), 2,000 mg, Intravenous, Q12H, Mike Lizama MD, 2,000 mg at 09/23/24 1845    chlorhexidine (PERIDEX) 0.12 % solution 15 mL, 15 mL,  Mouth/Throat, Q12H, Mike Lizama MD, 15 mL at 09/23/24 0810    citalopram (CeleXA) tablet 20 mg, 20 mg, Nasogastric, Daily, Layla Payne MD, 20 mg at 09/23/24 0810    dexmedetomidine (PRECEDEX) 400 mcg in 100 mL NS infusion, 0.2-1.5 mcg/kg/hr, Intravenous, Titrated, Mike Lizama MD, Last Rate: 18.8 mL/hr at 09/23/24 1846, 1 mcg/kg/hr at 09/23/24 1846    dextrose (D50W) (25 g/50 mL) IV injection 25 g, 25 g, Intravenous, Q15 Min PRN, Mike Lizama MD, 25 g at 09/21/24 0654    dextrose (GLUTOSE) oral gel 15 g, 15 g, Oral, Q15 Min PRN, Mike Lizama MD    fentaNYL citrate (PF) (SUBLIMAZE) injection 50 mcg, 50 mcg, Intravenous, Q1H PRN, Christoph Lizama MD, 50 mcg at 09/23/24 1526    folic acid (FOLVITE) tablet 1 mg, 1 mg, Nasogastric, Daily, Layla Payne MD, 1 mg at 09/23/24 0810    glucagon (GLUCAGEN) injection 1 mg, 1 mg, Subcutaneous, Q15 Min PRN, Mike Lizama MD    heparin (porcine) 5000 UNIT/ML injection 5,000 Units, 5,000 Units, Subcutaneous, Q8H, Christoph Lizama MD, 5,000 Units at 09/23/24 1317    heparin (porcine) injection 1,000-2,000 Units, 1,000-2,000 Units, Intravenous, PRN, Layla Payne MD, 2,000 Units at 09/21/24 1129    heparin injection 300 Units, 300 Units, Intracatheter, PRN, Layla Payne MD, 300 Units at 09/19/24 1229    ibuprofen (ADVIL,MOTRIN) tablet 400 mg, 400 mg, Oral, Once, Kvng Rg MD    ipratropium-albuterol (DUO-NEB) nebulizer solution 3 mL, 3 mL, Nebulization, 4x Daily - RT, Ricki Flood MD, 3 mL at 09/23/24 1556    levothyroxine (SYNTHROID, LEVOTHROID) tablet 25 mcg, 25 mcg, Nasogastric, Q AM, Layla Payne MD, 25 mcg at 09/23/24 0501    Magnesium Standard Dose Replacement - Follow Nurse / BPA Driven Protocol, , Does not apply, PRN, Layla Payne MD    metoprolol tartrate (LOPRESSOR) tablet 12.5 mg, 12.5 mg, Oral, Q12H, Yousif Puga MD, 12.5 mg at 09/23/24 1318    metroNIDAZOLE (FLAGYL)  IVPB 500 mg, 500 mg, Intravenous, Q8H, Ricki Flood MD, Last Rate: 200 mL/hr at 09/23/24 1142, 500 mg at 09/23/24 1142    ondansetron ODT (ZOFRAN-ODT) disintegrating tablet 4 mg, 4 mg, Oral, Q6H PRN **OR** ondansetron (ZOFRAN) injection 4 mg, 4 mg, Intravenous, Q6H PRN, Lilia Ramos APRN    pantoprazole (PROTONIX) injection 40 mg, 40 mg, Intravenous, Q24H, Mike Lizama MD, 40 mg at 09/23/24 0810    Pharmacy to dose vancomycin, , Does not apply, Continuous PRN, Kvng Rg MD    phenylephrine (JEWELL-SYNEPHRINE) 50 mg in 250 mL NS infusion, 0.5-3 mcg/kg/min, Intravenous, Titrated, Mike Lizama MD, Stopped at 09/23/24 0823    Phosphorus Replacement - Follow Nurse / BPA Driven Protocol, , Does not apply, PRN, Layla Payne MD    sodium chloride 0.9 % bolus 200 mL, 200 mL, Intravenous, PRN, Layla Payne MD    sodium chloride 0.9 % flush 10 mL, 10 mL, Intravenous, Q12H, Lilia Ramos APRN, 10 mL at 09/23/24 0811    [COMPLETED] thiamine (B-1) injection 200 mg, 200 mg, Intravenous, Q8H, 200 mg at 09/23/24 0032 **FOLLOWED BY** thiamine (VITAMIN B-1) tablet 100 mg, 100 mg, Nasogastric, Daily, Mike Lizama MD, 100 mg at 09/23/24 0810    vancomycin 1750 mg/500 mL 0.9% NS IVPB ([facility]), 20 mg/kg, Intravenous, Once, vKng Rg MD    vasopressin (PITRESSIN) 20 units in 100 mL NS infusion, 0.03 Units/min, Intravenous, Continuous, Christoph Lizama MD    [START ON 9/25/2024] vitamin D (ERGOCALCIFEROL) capsule 50,000 Units, 50,000 Units, Nasogastric, Q7 Days, Layla Payne MD    Allergies:   No Known Allergies    Social History:   Drinks alcohol daily  Smokes marijuana twice a week, no other known recreational drug use  Tobacco use daily 1PPD    Family History:   History reviewed. No pertinent family history.    Review of Systems:  Unable to obtain secondary to altered mental status     Physical Exam:   Vitals:    09/23/24 1800   BP: 102/59   Pulse: 118    Resp:    Temp: (!) 104.9 °F (40.5 °C)   SpO2: 92%     GENERAL: Intubated, sedated, critically ill  HEENT: no scleral icterus  RESPIRATORY: On ventilator with 80% FiO2 and 5 of PEEP  CARDIOVASCULAR: Heart rate 120s, off pressors, livedo reticularis of the bilateral lower extremities, SCDs in place  GASTROINTESTINAL: abdomen soft, nondistended, no guarding, no palpable masses  : chase in place    Diagnostic workup:     Pertinent labs:   Results from last 7 days   Lab Units 09/23/24  0348 09/22/24  0322 09/21/24  0427 09/20/24  1610 09/20/24  0456 09/19/24  1048 09/19/24  0534 09/18/24  1511 09/18/24  1202   WBC 10*3/mm3 18.12* 18.59* 17.47*  --  14.68*  --  20.92*  --  22.71*   HEMOGLOBIN g/dL 7.5* 8.1* 7.7*   < > 6.6*  --  7.9*   < > 8.1*   HEMATOCRIT % 22.7* 23.8* 21.9*   < > 19.7*  --  24.0*   < > 24.4*  23.7*   PLATELETS 10*3/mm3 73* 79* 80*  --  104* 162 128*  --  139*    < > = values in this interval not displayed.     Results from last 7 days   Lab Units 09/23/24  0348 09/22/24  0322 09/21/24  1758 09/21/24  0823 09/21/24  0427 09/20/24  0750 09/20/24  0457 09/19/24  1048 09/19/24  0534   SODIUM mmol/L 134* 135* 135*   < > 138   < > 140   < > 141  141   POTASSIUM mmol/L 3.5 3.7 3.8   < > 3.7   < > 3.0*   < > 3.6  3.6   CHLORIDE mmol/L 101 104 103   < > 105   < > 105   < > 109*  109*   CO2 mmol/L 23.0 22.0 21.0*   < > 20.2*   < > 23.0   < > 8.0*  8.0*   BUN mg/dL 44* 25* 18   < > 14   < > 37*   < > 119*  119*   CREATININE mg/dL 2.12* 1.37* 1.07   < > 0.78   < > 1.63*   < > 5.59*  5.59*   CALCIUM mg/dL 7.8* 7.7* 7.6*   < > 7.4*   < > 7.3*   < > 7.4*  7.4*   BILIRUBIN mg/dL  --   --   --   --  0.7  --  0.6  --  0.4   ALK PHOS U/L  --   --   --   --  158*  --  95  --  106   ALT (SGPT) U/L  --   --   --   --  22  --  12  --  15   AST (SGOT) U/L  --   --   --   --  78*  --  36  --  28   GLUCOSE mg/dL 119* 130* 116*   < > 75   < > 114*   < > 79  79    < > = values in this interval not displayed.        Imaging:  CT abd/pelvis reviewed - gallbladder is distended with surrounding stranding; no biliary ductal dilation or pancreatic ductal dilation; incidental left renal mass suspicious for neoplasm- f/u CT/MRI recommended. Abdominal aortic dissection questionably chronic present. Bilateral pleural effusions.    Assessment and plan:   Septic shock  Possible acute cholecystitis  Acute hypoxemic respiratory failure   Pneumonia  UTI  Alcohol abuse  Coronary artery disease s/p PCI  NSTEMI  Hypothyroidism  Anemia  SHANA  Possible underlying COPD with tobacco use  PVD  AGMA  Thrombocytopenia  Arm fracture  Heart failure systolic acute decompensated EF 30-35%    Recommend stat gallbladder ultrasound to further evaluate for cholecystitis. If equivocal could consider HIDA scan.    Patient is high risk for surgical intervention with cholecystectomy and if cholecystitis present would be a candidate for percutaneous cholecystostomy tube by IR. Recommend NPO for now and continue broad spectrum antibiotics.    Addendum: US results reviewed. Patient has evidence of acute cholecystitis with gallbladder wall thickening, pericholecystic fluid, and stones as well as possible polyps noted. Discussed with Dr. Harvey with IR, will plan for percutaneous cholecystostomy tube.     Angeline Dodd M.D.  General, Robotic, and Endoscopic Surgery  [facility]    [address], [address]  [place], [place], [address]  P: [phone]  F: [phone]

## 2024-09-23 NOTE — H&P (VIEW-ONLY)
General Surgery Consultation    Consulting Physician: Angeline Dodd MD  Referring: Christoph Lizama MD     Reason for consultation:   Acute cholecystitis    CC:   Septic shock, fever, possible cholecystitis    HPI:   The patient is a 68 y.o. male with history of CAD status post coronary stents with ischemic cardiomyopathy with EF of 30%, alcohol abuse, possible COPD with tobacco abuse, who presented to the hospital with generalized weakness and dizziness.  He was found to have multifactorial SHANA, sepsis with bacterial pneumonia and Klebsiella UTI, and subsequently developed respiratory failure for which he was intubated.  He developed renal failure and underwent CRRT which has been stopped.  He is pancytopenic.  Today he developed high fevers and underwent CT to evaluate for source of infection.  CT abdomen pelvis demonstrated mildly distended gallbladder with stranding and inflammation in the right upper quadrant suspicious for cholecystitis versus less likely focal hepatic flexure colitis, there was no biliary ductal dilation.  General surgery was consulted for further evaluation.  The patient's history is provided by his family members, nursing staff, and chart review as the patient is intubated and ventilated.    Family states patient has alcohol abuse and has had poor nutrition over the past several weeks.  They state that he was supposed to have an abdominal ultrasound last week but he canceled it.  They note that he is noncompliant with his daily medications.  They deny any known history of cirrhosis or hepatitis.     Past Medical History:  Alcoholism  Anxiety  Depression  Hypertension  Hyperlipidemia  CAD s/p PCI  Ischemic cardiomyopathy  COPD with tobacco abuse    Past Surgical History:  No known prior abdominal surgeries per family  5 coronary stents placed in 2005    Medications:  Medications Prior to Admission   Medication Sig Dispense Refill Last Dose    lisinopril (PRINIVIL,ZESTRIL) 30 MG  tablet Take 1 tablet by mouth Daily.       loratadine (CLARITIN) 10 MG tablet Take 1 tablet by mouth Daily.       metoprolol succinate XL (TOPROL-XL) 25 MG 24 hr tablet Take 1 tablet by mouth Daily.       potassium chloride (KLOR-CON M10) 10 MEQ CR tablet Take 1 tablet by mouth.       rosuvastatin (CRESTOR) 40 MG tablet Take 1 tablet by mouth Daily.       aspirin 81 MG EC tablet Take 1 tablet by mouth Daily.       citalopram (CeleXA) 40 MG tablet Take 1 tablet by mouth Daily.       folic acid (FOLVITE) 1 MG tablet Take 1 tablet by mouth Daily.          Current Facility-Administered Medications:     acetaminophen (TYLENOL) tablet 650 mg, 650 mg, Oral, Q4H PRN **OR** acetaminophen (TYLENOL) 160 MG/5ML oral solution 650 mg, 650 mg, Oral, Q4H PRN, 650 mg at 09/23/24 1738 **OR** acetaminophen (TYLENOL) suppository 650 mg, 650 mg, Rectal, Q4H PRN, Lilia Ramos, APRN    albuterol (PROVENTIL) nebulizer solution 0.083% 2.5 mg/3mL, 2.5 mg, Nebulization, Q6H PRN, Ricki Flood MD    aluminum-magnesium hydroxide-simethicone (MAALOX MAX) 400-400-40 MG/5ML suspension 15 mL, 15 mL, Nasogastric, Q6H PRN, Mike Lizama MD    aspirin chewable tablet 81 mg, 81 mg, Oral, Daily, Jasper Nguyen MD, 81 mg at 09/23/24 0810    atorvastatin (LIPITOR) tablet 20 mg, 20 mg, Oral, Nightly, Yousif Puga MD    sennosides-docusate (PERICOLACE) 8.6-50 MG per tablet 2 tablet, 2 tablet, Oral, BID PRN **AND** polyethylene glycol (MIRALAX) packet 17 g, 17 g, Oral, Daily PRN **AND** bisacodyl (DULCOLAX) EC tablet 5 mg, 5 mg, Oral, Daily PRN **AND** bisacodyl (DULCOLAX) suppository 10 mg, 10 mg, Rectal, Daily PRN, Lilia Ramos, APRYOLI    Calcium Replacement - Follow Nurse / BPA Driven Protocol, , Does not apply, PRN, Layla Payne MD    cefepime 2000 mg IVPB in 100 mL NS (MBP), 2,000 mg, Intravenous, Q12H, Mike Lizama MD, 2,000 mg at 09/23/24 1845    chlorhexidine (PERIDEX) 0.12 % solution 15 mL, 15 mL,  Mouth/Throat, Q12H, Mike Lizama MD, 15 mL at 09/23/24 0810    citalopram (CeleXA) tablet 20 mg, 20 mg, Nasogastric, Daily, Layla Payne MD, 20 mg at 09/23/24 0810    dexmedetomidine (PRECEDEX) 400 mcg in 100 mL NS infusion, 0.2-1.5 mcg/kg/hr, Intravenous, Titrated, Mike Lizama MD, Last Rate: 18.8 mL/hr at 09/23/24 1846, 1 mcg/kg/hr at 09/23/24 1846    dextrose (D50W) (25 g/50 mL) IV injection 25 g, 25 g, Intravenous, Q15 Min PRN, Mike Lizama MD, 25 g at 09/21/24 0654    dextrose (GLUTOSE) oral gel 15 g, 15 g, Oral, Q15 Min PRN, Mike Lizama MD    fentaNYL citrate (PF) (SUBLIMAZE) injection 50 mcg, 50 mcg, Intravenous, Q1H PRN, Christoph Lizama MD, 50 mcg at 09/23/24 1526    folic acid (FOLVITE) tablet 1 mg, 1 mg, Nasogastric, Daily, Layla Payne MD, 1 mg at 09/23/24 0810    glucagon (GLUCAGEN) injection 1 mg, 1 mg, Subcutaneous, Q15 Min PRN, Mike Lizama MD    heparin (porcine) 5000 UNIT/ML injection 5,000 Units, 5,000 Units, Subcutaneous, Q8H, Christoph Lizama MD, 5,000 Units at 09/23/24 1317    heparin (porcine) injection 1,000-2,000 Units, 1,000-2,000 Units, Intravenous, PRN, Layla Payne MD, 2,000 Units at 09/21/24 1129    heparin injection 300 Units, 300 Units, Intracatheter, PRN, Layla Payne MD, 300 Units at 09/19/24 1229    ibuprofen (ADVIL,MOTRIN) tablet 400 mg, 400 mg, Oral, Once, Kvng Rg MD    ipratropium-albuterol (DUO-NEB) nebulizer solution 3 mL, 3 mL, Nebulization, 4x Daily - RT, Ricki Flood MD, 3 mL at 09/23/24 1556    levothyroxine (SYNTHROID, LEVOTHROID) tablet 25 mcg, 25 mcg, Nasogastric, Q AM, Layla Payne MD, 25 mcg at 09/23/24 0501    Magnesium Standard Dose Replacement - Follow Nurse / BPA Driven Protocol, , Does not apply, PRN, Layla Payne MD    metoprolol tartrate (LOPRESSOR) tablet 12.5 mg, 12.5 mg, Oral, Q12H, Yousif Puga MD, 12.5 mg at 09/23/24 1318    metroNIDAZOLE (FLAGYL)  IVPB 500 mg, 500 mg, Intravenous, Q8H, Ricki Flood MD, Last Rate: 200 mL/hr at 09/23/24 1142, 500 mg at 09/23/24 1142    ondansetron ODT (ZOFRAN-ODT) disintegrating tablet 4 mg, 4 mg, Oral, Q6H PRN **OR** ondansetron (ZOFRAN) injection 4 mg, 4 mg, Intravenous, Q6H PRN, Lilia Ramos APRN    pantoprazole (PROTONIX) injection 40 mg, 40 mg, Intravenous, Q24H, Mike Lizama MD, 40 mg at 09/23/24 0810    Pharmacy to dose vancomycin, , Does not apply, Continuous PRN, Kvng Rg MD    phenylephrine (JEWELL-SYNEPHRINE) 50 mg in 250 mL NS infusion, 0.5-3 mcg/kg/min, Intravenous, Titrated, Mike Lizama MD, Stopped at 09/23/24 0823    Phosphorus Replacement - Follow Nurse / BPA Driven Protocol, , Does not apply, PRN, Layla Payne MD    sodium chloride 0.9 % bolus 200 mL, 200 mL, Intravenous, PRN, Layla Payne MD    sodium chloride 0.9 % flush 10 mL, 10 mL, Intravenous, Q12H, Lilia Ramos APRN, 10 mL at 09/23/24 0811    [COMPLETED] thiamine (B-1) injection 200 mg, 200 mg, Intravenous, Q8H, 200 mg at 09/23/24 0032 **FOLLOWED BY** thiamine (VITAMIN B-1) tablet 100 mg, 100 mg, Nasogastric, Daily, Mike Lizama MD, 100 mg at 09/23/24 0810    vancomycin 1750 mg/500 mL 0.9% NS IVPB ([facility]), 20 mg/kg, Intravenous, Once, Kvng Rg MD    vasopressin (PITRESSIN) 20 units in 100 mL NS infusion, 0.03 Units/min, Intravenous, Continuous, Christoph Lizama MD    [START ON 9/25/2024] vitamin D (ERGOCALCIFEROL) capsule 50,000 Units, 50,000 Units, Nasogastric, Q7 Days, Layla Payne MD    Allergies:   No Known Allergies    Social History:   Drinks alcohol daily  Smokes marijuana twice a week, no other known recreational drug use  Tobacco use daily 1PPD    Family History:   History reviewed. No pertinent family history.    Review of Systems:  Unable to obtain secondary to altered mental status     Physical Exam:   Vitals:    09/23/24 1800   BP: 102/59   Pulse: 118    Resp:    Temp: (!) 104.9 °F (40.5 °C)   SpO2: 92%     GENERAL: Intubated, sedated, critically ill  HEENT: no scleral icterus  RESPIRATORY: On ventilator with 80% FiO2 and 5 of PEEP  CARDIOVASCULAR: Heart rate 120s, off pressors, livedo reticularis of the bilateral lower extremities, SCDs in place  GASTROINTESTINAL: abdomen soft, nondistended, no guarding, no palpable masses  : chase in place    Diagnostic workup:     Pertinent labs:   Results from last 7 days   Lab Units 09/23/24  0348 09/22/24  0322 09/21/24  0427 09/20/24  1610 09/20/24  0456 09/19/24  1048 09/19/24  0534 09/18/24  1511 09/18/24  1202   WBC 10*3/mm3 18.12* 18.59* 17.47*  --  14.68*  --  20.92*  --  22.71*   HEMOGLOBIN g/dL 7.5* 8.1* 7.7*   < > 6.6*  --  7.9*   < > 8.1*   HEMATOCRIT % 22.7* 23.8* 21.9*   < > 19.7*  --  24.0*   < > 24.4*  23.7*   PLATELETS 10*3/mm3 73* 79* 80*  --  104* 162 128*  --  139*    < > = values in this interval not displayed.     Results from last 7 days   Lab Units 09/23/24  0348 09/22/24  0322 09/21/24  1758 09/21/24  0823 09/21/24  0427 09/20/24  0750 09/20/24  0457 09/19/24  1048 09/19/24  0534   SODIUM mmol/L 134* 135* 135*   < > 138   < > 140   < > 141  141   POTASSIUM mmol/L 3.5 3.7 3.8   < > 3.7   < > 3.0*   < > 3.6  3.6   CHLORIDE mmol/L 101 104 103   < > 105   < > 105   < > 109*  109*   CO2 mmol/L 23.0 22.0 21.0*   < > 20.2*   < > 23.0   < > 8.0*  8.0*   BUN mg/dL 44* 25* 18   < > 14   < > 37*   < > 119*  119*   CREATININE mg/dL 2.12* 1.37* 1.07   < > 0.78   < > 1.63*   < > 5.59*  5.59*   CALCIUM mg/dL 7.8* 7.7* 7.6*   < > 7.4*   < > 7.3*   < > 7.4*  7.4*   BILIRUBIN mg/dL  --   --   --   --  0.7  --  0.6  --  0.4   ALK PHOS U/L  --   --   --   --  158*  --  95  --  106   ALT (SGPT) U/L  --   --   --   --  22  --  12  --  15   AST (SGOT) U/L  --   --   --   --  78*  --  36  --  28   GLUCOSE mg/dL 119* 130* 116*   < > 75   < > 114*   < > 79  79    < > = values in this interval not displayed.        Imaging:  CT abd/pelvis reviewed - gallbladder is distended with surrounding stranding; no biliary ductal dilation or pancreatic ductal dilation; incidental left renal mass suspicious for neoplasm- f/u CT/MRI recommended. Abdominal aortic dissection questionably chronic present. Bilateral pleural effusions.    Assessment and plan:   Septic shock  Possible acute cholecystitis  Acute hypoxemic respiratory failure   Pneumonia  UTI  Alcohol abuse  Coronary artery disease s/p PCI  NSTEMI  Hypothyroidism  Anemia  SHANA  Possible underlying COPD with tobacco use  PVD  AGMA  Thrombocytopenia  Arm fracture  Heart failure systolic acute decompensated EF 30-35%    Recommend stat gallbladder ultrasound to further evaluate for cholecystitis. If equivocal could consider HIDA scan.    Patient is high risk for surgical intervention with cholecystectomy and if cholecystitis present would be a candidate for percutaneous cholecystostomy tube by IR. Recommend NPO for now and continue broad spectrum antibiotics.    Addendum: US results reviewed. Patient has evidence of acute cholecystitis with gallbladder wall thickening, pericholecystic fluid, and stones as well as possible polyps noted. Discussed with Dr. Harvey with IR, will plan for percutaneous cholecystostomy tube.     Angeline Dodd M.D.  General, Robotic, and Endoscopic Surgery  [facility] [place], operated by [facility]    [address], [address]  [place], [place], [address]  P: [phone]  F: [phone]

## 2024-09-23 NOTE — PROGRESS NOTES
Case discussed with Dr. Christoph Lizama.  We had been consulted 9/18/2024 but this was never transmitted to our service.  At this point the patient is gradually recovering.  A full consult will not be pursued.  Please notify us directly if we can be of any further assistance and we apologize for the miscommunication. ENRIQUE

## 2024-09-24 NOTE — PROGRESS NOTES
Pulmonary/critical care    Notified AM ABG ph 7.180, pCO2 55.8, HCO3 20.8  Examined at bedside patient on AC/PC; getting volumes ~350-400, rate set at 10 however patient resp rate around 26  Insp pressure increased with improvement in volumes to ~500-550 consistently  Will get repeat ABG at 0600

## 2024-09-24 NOTE — PROGRESS NOTES
"  Daily Progress Note.   [facility] INTENSIVE CARE  9/24/2024    Patient:  Name:  Dixon Montoya  MRN:  [id number]  1955  68 y.o.  male         CC: Critical care management    Interval History:  Maintains on mechanical ventilation.     Fever overnight with ct showing acute kali,surgery eval,  ir drain placed    Physical Exam:  /71   Pulse 80   Temp 100 °F (37.8 °C) (Bladder)   Resp 24   Ht 172.7 cm (67.99\")   Wt 85.2 kg (187 lb 13.3 oz)   SpO2 99%   BMI 28.57 kg/m²   Body mass index is 28.57 kg/m².    Intake/Output Summary (Last 24 hours) at 9/24/2024 1254  Last data filed at 9/24/2024 0800  Gross per 24 hour   Intake 3134.25 ml   Output 1490 ml   Net 1644.25 ml   Vent Settings          Resp Rate (Set): 10  Pressure Support (cm H2O): 8 cm H20  FiO2 (%): 60 %  PEEP/CPAP (cm H2O): 5 cm H20    Minute Ventilation (L/min) (Obs): 11.6 L/min  Resp Rate (Observed) Vent: 24  I:E Ratio (Set): 1:5.06  I:E Ratio (Obs): 1:1.2    PIP Observed (cm H2O): 26 cm H2O  Plateau Pressure (cm H2O): 0 cm H2O  Driving Pressure (cm H2O): -3.6 cm H2O    General appearance: Ill on mechanical ventilation unresponsive will move an agitated manner at times  HENT: Positive ET tube positive right IJ Shiley  Lungs:bilateral air entry mechanical synchronous with mechanical respiration respiratory effort and no intercostal retractions  CV: RRR, no rub   Abdomen: Nonrigid positive: Drain  Extremities: Positive peripheral edema 1+ left arm wrapped ecchymotic  Skin: Warm  Psych/neuro sedated limiting exam will not follow commands    Data Review:  Notable Labs:  Results from last 7 days   Lab Units 09/24/24  0351 09/23/24  0348 09/22/24  0322 09/21/24  0427 09/20/24  1610 09/20/24  0456 09/19/24  1048 09/19/24  0534 09/18/24  1511 09/18/24  1202   WBC 10*3/mm3 23.55* 18.12* 18.59* 17.47*  --  14.68*  --  20.92*  --  22.71*   HEMOGLOBIN g/dL 7.4* 7.5* 8.1* 7.7* 7.9* 6.6*  --  7.9*   < > 8.1*   PLATELETS 10*3/mm3 104* 73* 79* " 80*  --  104* 162 128*  --  139*    < > = values in this interval not displayed.     Results from last 7 days   Lab Units 09/24/24  0351 09/23/24  0348 09/22/24  0322 09/21/24  1758 09/21/24  0823 09/21/24  0427 09/21/24  0044 09/20/24  1610 09/20/24  0750   SODIUM mmol/L 134* 134* 135* 135* 135* 138 137 138 139   POTASSIUM mmol/L 5.6* 3.5 3.7 3.8 3.9 3.7 3.7 3.7 3.6   CHLORIDE mmol/L 102 101 104 103 103 105 105 106 104   CO2 mmol/L 19.8* 23.0 22.0 21.0* 21.0* 20.2* 21.5* 21.0* 22.9   BUN mg/dL 66* 44* 25* 18 12 14 14 20 30*   CREATININE mg/dL 2.69* 2.12* 1.37* 1.07 0.64* 0.78 0.84 0.98 1.37*   GLUCOSE mg/dL 115* 119* 130* 116* 95 75 79 70 106*   CALCIUM mg/dL 7.5* 7.8* 7.7* 7.6* 7.6* 7.4* 7.4* 7.4* 7.4*   MAGNESIUM mg/dL 1.9 1.7 2.0  --  2.2  --  2.2 2.1 2.0   PHOSPHORUS mg/dL 7.5* 3.6 3.7  --  3.5  --  3.7 3.1 2.8   Estimated Creatinine Clearance: 28.3 mL/min (A) (by C-G formula based on SCr of 2.69 mg/dL (H)).    Results from last 7 days   Lab Units 09/24/24  0351 09/23/24  0348 09/22/24  0322 09/21/24  0427 09/20/24  0457 09/19/24  1048 09/19/24  0534 09/18/24  0609 09/18/24  0300 09/18/24  0145 09/18/24  0023   LDH U/L  --   --   --   --   --   --  543*  --   --   --   --    AST (SGOT) U/L 80*  --   --  78* 36  --  28   < >  --   --  31   ALT (SGPT) U/L 36  --   --  22 12  --  15   < >  --   --  19   PROCALCITONIN ng/mL  --   --   --   --   --   --   --   --   --   --  1.41*   LACTATE mmol/L  --   --  0.9 1.2 1.5   < >  --    < >  --    < >  --    FERRITIN ng/mL  --   --   --   --   --   --   --   --  1,835.00*  --   --    PLATELETS 10*3/mm3 104* 73* 79* 80*  --    < > 128*   < >  --   --  137*    < > = values in this interval not displayed.       Results from last 7 days   Lab Units 09/24/24  0609 09/24/24  0408 09/23/24  0435 09/22/24  0317 09/21/24  1039 09/20/24  0535 09/19/24  1307 09/19/24  0817 09/19/24  0637 09/18/24  1355   PH, ARTERIAL pH units 7.216* 7.180* 7.469* 7.352 7.483* 7.600* 7.207* 7.110*  7.051* 7.378   PCO2, ARTERIAL mm Hg 54.4* 55.8* 33.6* 42.8 27.8* 25.1* 34.9* 25.4* 33.5* 17.2*   PO2 ART mm Hg 150.1* 112.7* 79.9* 88.1 64.4* 90.5 108.9* 191.9* 50.7* 60.9*   HCO3 ART mmol/L 22.1 20.8* 24.4 23.7 20.8* 24.6 13.8* 8.1* 9.3* 10.1*     Triglycerides 402           Urine Culture - Urine, Straight Cath [[id number]]     (Abnormal)   Urine from Straight Cath    Final result Component Value   Urine Culture >100,000 CFU/mL Klebsiella oxytoca Abnormal          Susceptibility     Klebsiella oxytoca     GABRIELE     Amoxicillin + Clavulanate >=32 Resistant     Ampicillin >=32 Resistant     Ampicillin + Sulbactam >=32 Resistant     Cefazolin >=64 Resistant     Cefepime <=1 Susceptible     Ceftazidime <=1 Susceptible     Ceftriaxone 8 Resistant     Gentamicin <=1 Susceptible     Levofloxacin <=0.12 Susceptible     Nitrofurantoin 32 Susceptible     Piperacillin + Tazobactam >=128 Resistant     Trimethoprim + Sulfamethoxazole <=20 Susceptible                          Imaging:  Reviewed chest images personally from past 3 days    ASSESSMENT  /  PLAN:  Septic shock  Acute hypoxemic respiratory failure mechanical ventilation reviewed  Pneumonia  UTI  Alcohol abuse  Coronary artery disease  NSTEMI with wma - cards evaluating managing  Hypothyroidism  Anemia  SHANA  Possible underlying COPD  PVD  AGMA  Thrombocytopenia - insetting of sepsis and etoh intake, continue to trend  Fracture of arm -ortho conulted, not really a candidate for any operative intervention right now., sling follow up 2 weeks  Heart failure systolic acute decompensated EF 35%  Chronic aortic dissection flap in infrarenal abd aorta -evaluated by Charlotte.  PAD - follow up with charlotte - statin and antiplt  Acute kali s/p kali tube via IR 9/23/24      Continue to monitor renal function renal replacement needs appreciate nephrology assistance - bicarb drip potential crrt this afternoon.    Serial ABG chest x-rays mechanical ventilation reviewed adjustments as  "appropriate.  Increased ventilation to help compensate for met acidosis  Oxygenation limiting SBT's - will need fluid removal  DuoNebs  Off propofol given elevated trace of glycerides.  Precedex  Fentanyl as needed  If needed can utilize Versed drip given his alcohol abuse.    Intermittent vasopressors    Thiamine  Folic acid   Dw pts daughters, they state he was heavy drinker/\"alcoholic\"    Flagyl/cefepime/vancomycin  Klebsiella oxytocin reviewed  Follow microbiology  Discussed with Dr. Wallace     Plan of care and patient course was reviewed with multidisciplinary team in morning rounds.    Discussed with pt daughter at bedside all questions answered    Total critical care time was 45 minutes, excluding any separately billable procedure time.  Time did not overlap with any other provider.      "

## 2024-09-24 NOTE — PROGRESS NOTES
"General Surgery Progress Note    CC: Septic shock, acute cholecystitis    S: Status post cholecystostomy tube placement overnight.  Initially had 50 cc of dark bile out.  Per nursing staff the tube has been difficult to flush today.  Patient intermittently on pressors with worsening renal function and continued fevers per nursing staff.    O:/71   Pulse 80   Temp 100 °F (37.8 °C) (Bladder)   Resp 24   Ht 172.7 cm (67.99\")   Wt 85.2 kg (187 lb 13.3 oz)   SpO2 99%   BMI 28.57 kg/m²     Intake & Output (last day)         09/23 0701  09/24 0700 09/24 0701 09/25 0700    P.O.      I.V. (mL/kg) 1854.3 (21.8)     Other 526     NG/     IV Piggyback 200     Total Intake(mL/kg) 3134.3 (36.8)     Urine (mL/kg/hr) 1675 (0.8)     Emesis/NG output      Drains 50 15    Stool      Total Output 1725 15    Net +1409.3 -15          Urine Unmeasured Occurrence 350 x             GENERAL: Intubated, critically ill  HEENT: ET tube in place  RESPIRATORY: On ventilator with 60% FiO2 and 5 of PEEP  CARDIOVASCULAR: Heart rate 130s, on pressors, worsening livedo reticularis of the bilateral lower extremities, SCDs in place  GASTROINTESTINAL: abdomen soft, nondistended, no guarding, percutaneous cholecystostomy tube in place with approximately 5 cc of thick black bile in the bulb  : chase in place    LABS  Results from last 7 days   Lab Units 09/24/24  0351 09/23/24  0348 09/22/24  0322 09/20/24  1610 09/20/24  0456 09/18/24  0609 09/18/24  0023   WBC 10*3/mm3 23.55* 18.12* 18.59*   < > 14.68*   < > 21.21*   HEMOGLOBIN g/dL 7.4* 7.5* 8.1*   < > 6.6*   < > 5.6*   HEMATOCRIT % 22.1* 22.7* 23.8*   < > 19.7*   < > 15.6*   PLATELETS 10*3/mm3 104* 73* 79*   < > 104*   < > 137*   MONOCYTES % %  --   --   --   --  0.0*  --  1.0*   EOSINOPHIL % %  --   --   --   --  0.0*  --   --     < > = values in this interval not displayed.     Results from last 7 days   Lab Units 09/24/24  0351 09/23/24  0348 09/22/24  0322 09/21/24  0823 " 09/21/24  0427 09/20/24  0750 09/20/24  0457   SODIUM mmol/L 134* 134* 135*   < > 138   < > 140   POTASSIUM mmol/L 5.6* 3.5 3.7   < > 3.7   < > 3.0*   CHLORIDE mmol/L 102 101 104   < > 105   < > 105   CO2 mmol/L 19.8* 23.0 22.0   < > 20.2*   < > 23.0   BUN mg/dL 66* 44* 25*   < > 14   < > 37*   CREATININE mg/dL 2.69* 2.12* 1.37*   < > 0.78   < > 1.63*   CALCIUM mg/dL 7.5* 7.8* 7.7*   < > 7.4*   < > 7.3*   BILIRUBIN mg/dL 0.5  --   --   --  0.7  --  0.6   ALK PHOS U/L 309*  --   --   --  158*  --  95   ALT (SGPT) U/L 36  --   --   --  22  --  12   AST (SGOT) U/L 80*  --   --   --  78*  --  36   GLUCOSE mg/dL 115* 119* 130*   < > 75   < > 114*    < > = values in this interval not displayed.     Results from last 7 days   Lab Units 09/23/24  2115 09/18/24  1511   INR  1.55* 1.26*     Imaging:   CT guided drainage images and report reviewed - satisfactory perc kali tube placement with 50 cc bile out initially    Assessment and Plan:  Septic shock  Acute cholecystitis  Acute hypoxemic respiratory failure   Pneumonia  UTI  Alcohol abuse  Coronary artery disease s/p PCI  NSTEMI  Hypothyroidism  Anemia  SHANA  Possible underlying COPD with tobacco use  PVD  AGMA  Thrombocytopenia  Arm fracture  Heart failure systolic acute decompensated EF 30-35%    Continue percutaneous cholecystostomy tube drainage with flushing TID. Follow up drain cultures. Antibiotics per ID.      Angeline Dodd MD  General, Robotic and Endoscopic Surgery  [facility]    [address], [address]  [place], [place], [address]  P: [phone]  F: [phone]

## 2024-09-24 NOTE — PROGRESS NOTES
"CC: Elevated troponin    Interval History: Status post cholecystostomy tube placement on 9/23/2024      Vital Signs  Temp:  [93.2 °F (34 °C)-105.3 °F (40.7 °C)] 98.4 °F (36.9 °C)  Heart Rate:  [] 88  Resp:  [24-25] 25  BP: ()/(48-79) 99/72  FiO2 (%):  [40 %-80 %] 40 %    Intake/Output Summary (Last 24 hours) at 9/24/2024 1554  Last data filed at 9/24/2024 1443  Gross per 24 hour   Intake 3134.25 ml   Output 1090 ml   Net 2044.25 ml     Flowsheet Rows      Flowsheet Row First Filed Value   Admission Height 172.7 cm (68\") Documented at 09/17/2024 2341   Admission Weight 78.5 kg (173 lb) Documented at 09/17/2024 2341            PHYSICAL EXAM:  General: Sedated on mechanical ventilator  Resp:NL Rate, symmetric chest expansion,unlabored, clear  CV:NL rate and rhythm, NL PMI, NL S1 and S2, no Murmur, no gallop, no rub, No JVD.   ABD:Nl sounds, no masses or tenderness, nondistended, no guarding or rebound  Neuro: Sedated   Extr: Feet appear mottled      Results Review:    Results from last 7 days   Lab Units 09/24/24  1434   SODIUM mmol/L 133*   POTASSIUM mmol/L 6.4*   CHLORIDE mmol/L 99   CO2 mmol/L 18.0*   BUN mg/dL 14   CREATININE mg/dL 3.15*   GLUCOSE mg/dL 158*   CALCIUM mg/dL 7.2*     Results from last 7 days   Lab Units 09/19/24  0534 09/18/24  0609 09/18/24  0300 09/18/24  0023   CK TOTAL U/L 681* 692*  --   --    HSTROP T ng/L  --   --  600* 665*     Results from last 7 days   Lab Units 09/24/24  0351   WBC 10*3/mm3 23.55*   HEMOGLOBIN g/dL 7.4*   HEMATOCRIT % 22.1*   PLATELETS 10*3/mm3 104*     Results from last 7 days   Lab Units 09/23/24  2115 09/18/24  1511   INR  1.55* 1.26*         Results from last 7 days   Lab Units 09/24/24  0351   MAGNESIUM mg/dL 1.9     Results from last 7 days   Lab Units 09/22/24  0322   TRIGLYCERIDES mg/dL 402*     I reviewed the patient's new clinical results.  I personally viewed and interpreted the patient's EKG/Telemetry data        Medication Review:   Meds " reviewed    dexmedetomidine, 0.2-1.5 mcg/kg/hr, Last Rate: 1.2 mcg/kg/hr (09/24/24 1304)  norepinephrine, 0.02-0.3 mcg/kg/min, Last Rate: 0.2 mcg/kg/min (09/24/24 1501)  Pharmacy to dose vancomycin,   Phoxillum BK4/2.5, 1,500 mL/hr  Phoxillum BK4/2.5, 1,500 mL/hr  Phoxillum BK4/2.5, 1,500 mL/hr  sodium bicarbonate 150 mEq in D5W, 75 mL/hr, Last Rate: 75 mL/hr (09/24/24 1004)  vasopressin, 0.03 Units/min, Last Rate: 0.03 Units/min (09/24/24 0634)        Assessment/Plan           Elevated troponin from underlying physiological distress: Respiratory failure, severe sepsis  Cardiomyopathy, ischemic, EF of about 30%-history of PCI  Left bundle branch block.  Chronicity unknown.  Awaiting records.  Anemia.    Acute respiratory failure requiring intubatio on mechanical ventilator  SHANA.  Nephrology following.  Off CRRT  Sepsis with suspected bacterial pneumonia.  On antibiotics.  Sputum culture without an organism just yet.  UTI, noted with Klebsiella in urine  Alcohol abuse, complicating all the above.  Peripheral vascular disease.  Anion gap metabolic acidosis.  Resolved  Shock.  Resolved  Thrombocytopenia.  Stable  New source of infection detected on CT-acute cholecystitis status post cholecystostomy tube on 9/23/2024  Start metoprolol if blood pressure tolerates.  Currently on hold.  Cardiac status likely to improve with infection source control and improvement of respiratory status.   I have discussed patient with his nurse   Will see on Friday but call with any questions in between       Yousif Puga MD  09/24/24  15:54 EDT

## 2024-09-24 NOTE — PROGRESS NOTES
"[facility] Clinical Pharmacy Services: Vancomycin Pharmacokinetic Initial Consult Note    Dixon Montoya is a 68 y.o. male who is on day 1 of pharmacy to dose vancomycin.    Indication: Intra-Abdominal Infection  Consulting Provider: Ifrah  Planned Duration of Therapy: 7 days  Loading Dose Ordered or Given: 1750 mg on 9/23 at 1946  MRSA PCR performed: 9/19; Result: negative  Culture/Source:   9/23 RespCx -pending  9/23 BloodCx -pending x2  Target: Dose by Levels  Pertinent Vanc Dosing History:   Vanc 9/19 - 9/20  Other Antimicrobials:   Cefepime  Metronidazole    Vitals/Labs  Ht: 172.7 cm (67.99\"); Wt: 84.2 kg (185 lb 10 oz)  Temp Readings from Last 1 Encounters:   09/23/24 (!) 104.9 °F (40.5 °C)    Estimated Creatinine Clearance: 35.7 mL/min (A) (by C-G formula based on SCr of 2.12 mg/dL (H)).  SHANA (CRRT stopped 9/21)     Results from last 7 days   Lab Units 09/23/24  0348 09/22/24  0322 09/21/24  1758 09/21/24  0823 09/21/24  0427   CREATININE mg/dL 2.12* 1.37* 1.07   < > 0.78   WBC 10*3/mm3 18.12* 18.59*  --   --  17.47*    < > = values in this interval not displayed.     Assessment/Plan:    Vancomycin Dose: no further dose needed tonight  Vanc Random has been ordered for 9/24 at 0600 with AM labs     Pharmacy will follow patient's kidney function and will adjust doses and obtain levels as necessary. Thank you for involving pharmacy in this patient's care. Please contact pharmacy with any questions or concerns.                           Lita Myrick, [facility]  Clinical Pharmacist   "

## 2024-09-24 NOTE — PROGRESS NOTES
[facility] Progress Note      Patient Name: Dixon Montoya  : 1955  MRN: [id number]  Primary Care Physician:  Jaquelin Rueda APRN  Date of admission: 2024    Subjective     Interval History:   F/u SHANA    Review of Systems:   Noted worsening pressor requirements today.  Febrile.  Urine output has been around 1675 cc per 24 hours  Family at bedside  Objective     Vitals:   Temp:  [93.2 °F (34 °C)-105.3 °F (40.7 °C)] 104.4 °F (40.2 °C)  Heart Rate:  [] 110  Resp:  [24-25] 24  BP: ()/(48-79) 101/64  FiO2 (%):  [41 %-80 %] 60 %    Intake/Output Summary (Last 24 hours) at 2024 0951  Last data filed at 2024 0400  Gross per 24 hour   Intake 3134.25 ml   Output 1725 ml   Net 1409.25 ml       Physical Exam:    General Appearance: frail WM sedated on ventilator and CRRT  Neck: RIJ shiley in place, no JVD  Lungs: coarse BS bilat  Heart: RRR, normal S1 and S2  Abdomen: soft, nontender, nondistended  : chase with approx 150 cc urine  Extremities: 1+ BLE edema    Scheduled Meds:     aspirin, 81 mg, Oral, Daily  atorvastatin, 20 mg, Oral, Nightly  [START ON 2024] cefepime, 2,000 mg, Intravenous, Q24H  chlorhexidine, 15 mL, Mouth/Throat, Q12H  citalopram, 20 mg, Nasogastric, Daily  folic acid, 1 mg, Nasogastric, Daily  heparin (porcine), 5,000 Units, Subcutaneous, Q8H  ipratropium-albuterol, 3 mL, Nebulization, 4x Daily - RT  levothyroxine, 25 mcg, Nasogastric, Q AM  metoprolol tartrate, 12.5 mg, Oral, Q12H  metroNIDAZOLE, 500 mg, Intravenous, Q8H  pantoprazole, 40 mg, Intravenous, Q24H  sodium chloride, 10 mL, Intravenous, Q12H  sodium zirconium cyclosilicate, 10 g, Oral, TID  thiamine, 100 mg, Nasogastric, Daily  Vancomycin Pharmacy Intermittent/Pulse Dosing, , Does not apply, Daily  [START ON 2024] vitamin D, 50,000 Units, Nasogastric, Q7 Days      IV Meds:   dexmedetomidine, 0.2-1.5 mcg/kg/hr, Last Rate: 1.2 mcg/kg/hr (24 0939)  norepinephrine,  0.02-0.3 mcg/kg/min, Last Rate: 0.18 mcg/kg/min (09/24/24 0513)  Pharmacy to dose vancomycin,   sodium bicarbonate 150 mEq in D5W, 75 mL/hr, Last Rate: 75 mL/hr (09/24/24 0841)  vasopressin, 0.03 Units/min, Last Rate: 0.03 Units/min (09/24/24 0634)        Results Reviewed:   I have personally reviewed the results from the time of this admission to 9/24/2024 09:51 EDT     Results from last 7 days   Lab Units 09/24/24  0351 09/23/24 0348 09/22/24  0322 09/21/24  0823 09/21/24  0427 09/20/24  0750 09/20/24  0457   SODIUM mmol/L 134* 134* 135*   < > 138   < > 140   POTASSIUM mmol/L 5.6* 3.5 3.7   < > 3.7   < > 3.0*   CHLORIDE mmol/L 102 101 104   < > 105   < > 105   CO2 mmol/L 19.8* 23.0 22.0   < > 20.2*   < > 23.0   BUN mg/dL 66* 44* 25*   < > 14   < > 37*   CREATININE mg/dL 2.69* 2.12* 1.37*   < > 0.78   < > 1.63*   CALCIUM mg/dL 7.5* 7.8* 7.7*   < > 7.4*   < > 7.3*   BILIRUBIN mg/dL 0.5  --   --   --  0.7  --  0.6   ALK PHOS U/L 309*  --   --   --  158*  --  95   ALT (SGPT) U/L 36  --   --   --  22  --  12   AST (SGOT) U/L 80*  --   --   --  78*  --  36   GLUCOSE mg/dL 115* 119* 130*   < > 75   < > 114*    < > = values in this interval not displayed.     Estimated Creatinine Clearance: 28.3 mL/min (A) (by C-G formula based on SCr of 2.69 mg/dL (H)).  Results from last 7 days   Lab Units 09/24/24  0351 09/23/24 0348 09/22/24 0322   MAGNESIUM mg/dL 1.9 1.7 2.0   PHOSPHORUS mg/dL 7.5* 3.6 3.7         Results from last 7 days   Lab Units 09/24/24  0351 09/23/24  0348 09/22/24  0322 09/21/24  0427 09/20/24  1610 09/20/24  0456   WBC 10*3/mm3 23.55* 18.12* 18.59* 17.47*  --  14.68*   HEMOGLOBIN g/dL 7.4* 7.5* 8.1* 7.7* 7.9* 6.6*   PLATELETS 10*3/mm3 104* 73* 79* 80*  --  104*     Results from last 7 days   Lab Units 09/23/24  2115 09/18/24  1511   INR  1.55* 1.26*       Assessment / Plan     ASSESSMENT:  Olig -> non olig SHANA.  Likely ATN, was on CRRt that was stopped on 9/21/2023., as UOP now robust (2.5L/24h),   Acute  hypoxemic, hypercapnic respiratory failure.  On the ventilator FIo2 weaned 40%  Vol overload -excellent diuresis with Bumex 2 mg IV 3 times daily  Septic shock - PNA/UTI, abx per PULM, off pressor  HFrEF, EF 35% by echo 9/18/24  Anemia, transfused on 9/20; hgb up 8.1 today  TCP, PLT dropping, 80K today  AG metabolic acidosis - starvation ketosis suspected, improved with CRRT, and bicarb is stable/normal 23 today off the CRRT  Left olecranon fracture  Hx ETOH abuse   Mild hyponatremia    PLAN:  Kidney function is slightly worse today likely secondary to ongoing sepsis and hypotension requiring pressors.  Mild hyperkalemia will start patient on Lokelma  I will go ahead and start patient on bicarbonate drip  Repeat labs at 2.  May need to be restarted on CRRT will reassess later on.      Silva Bush MD  09/24/24  09:51 EDT    [facility]  [phone]

## 2024-09-24 NOTE — PROGRESS NOTES
ID note for sepsis  He has had progressive septic shock now on pressors.  Still with high fevers up to 105.  CT imaging yesterday showed acute cholecystitis as the source of infection and he underwent cholecystostomy tube placement however there is not been good flow from that tube per my conversation with nursing    Physical Exam:   Vital Signs   Temp:  [93.2 °F (34 °C)-105.3 °F (40.7 °C)] 104.4 °F (40.2 °C)  Heart Rate:  [] 110  Resp:  [24-25] 24  BP: ()/(48-79) 101/64  FiO2 (%):  [41 %-80 %] 60 %  GENERAL: Ill-appearing  HEENT: Oropharynx with ET tube in place. Hearing is unable to assess  EYES: . No conjunctival injection. No lid lag.   GI: Soft, nontender, nondistended. No appreciable organomegaly.   SKIN: Warm and dry without cutaneous eruptions   PSYCHIATRIC: Sedated, no purposeful movements over sedation for me  Edematous    Results Review:  Creatinine 2.69  Alk phos 309, ALT 36, AST 80, total bilirubin 0.4  White count 23.55  Vancomycin 26.8      9/18 Ucx >100K Klebsiella oxytoca  9/18 Bcx 2/2 neg  9/18 rpp, strep/legionella neg  9/19 MRSA PCR neg  9/19 ETT cx c albicans  9/23 blood cultures 2/2 no growth  9/23 gallbladder culture no growth    CT chest independently interpreted shows bilateral right greater than left pleural effusions with likely compressive atelectasis and mild infiltrates versus edema.    A/p  Sepsis  Acute kidney injury  Acute hypoxic respiratory failure  Alcohol abuse disorder with withdrawal  Acute cholecystitis    CT abdomen pelvis showed acute cholecystitis which was confirmed on Mary ultrasound.  General surgery consulted at high risk for surgery so IR consulted. Status post cholecystostomy tube placement overnight for acute cholecystitis.  Unfortunately the drain does not seem to be putting out much and appears to be clogged per my conversation with nursing.  We are discussing with general surgery.  Continue vancomycin, spot dosing given SHANA along with cefepime and  metronidazole.  Antibiotics are renally dosed.  We will tailor antibiotics based on gallbladder culture.  Discussed with Dr. Santiago regarding impression and plans

## 2024-09-24 NOTE — PROGRESS NOTES
"[facility]: Vancomycin Monitoring Note    Dixon Montoya is a 68 y.o. male who is on day 1/7 of pharmacy to dose vancomycin for Intra-Abdominal Infection.    Previous Vancomycin Dose:   intermittent dosing  Updated Cultures and Sensitivities:    - 9/19 MRSA nares negative   - 9/23 RespCx - +1 Candida albicans, no normal respiratory fang   - 9/23 BloodCx -pending x2   - 9/23 Anaerobic Gallbladder fluid Cx -pending   - 9/23 Gallbladder fluid Cx -NGTD    Results from last 7 days   Lab Units 09/24/24  0351 09/20/24  0954   VANCOMYCIN RM mcg/mL 26.80  --    VANCOMYCIN TR mcg/mL  --  9.10     Vitals/Labs  Ht: 172.7 cm (67.99\"); Wt: 85.2 kg (187 lb 13.3 oz)   Temp Readings from Last 1 Encounters:   09/24/24 98.4 °F (36.9 °C) (Bladder)     Estimated Creatinine Clearance: 24.2 mL/min (A) (by C-G formula based on SCr of 3.15 mg/dL (H)).   Pending nephrology, CRRT v. HD    Results from last 7 days   Lab Units 09/24/24  1434 09/24/24  0351 09/23/24  0348 09/22/24  0322   CREATININE mg/dL 3.15* 2.69* 2.12* 1.37*   WBC 10*3/mm3  --  23.55* 18.12* 18.59*     Assessment/Plan  Random level this AM 26.8 and will not require further dosing tonight. Pharmacy to dose pending decision for CRRT v. HD.  Current Vancomycin Dose: Received once dose of vancomycin 1750 mg on 9/23 at 1946. Intermittent dose by levels. No doses required today 9/24.  Next Level Date and Time: Will schedule level pending RRT.  We will continue to monitor patient changes and renal function     Thank you for involving pharmacy in this patient's care. Please contact pharmacy with any questions or concerns.       Edyta Gunderson, [facility]  Clinical Pharmacist  "

## 2024-09-24 NOTE — CONSULTS
"Nutrition Services    Patient Name:  Dixon Montoya  YOB: 1955  MRN: [id number]  Admit Date:  9/17/2024    Assessment Date:  09/24/24    Summary: Follow up  Pt remains intubated and sedated on precedex. Currently on 2 pressors. Pt found to have acute cholecystitis with gallbladder wall thickening. Pt taken to IR and had percutaneous cholecystostomy tube. Nephrology has started pt on D5 in Bicarb drip at 75 ml/hr providing 306 kcals/day.     RECS:  Please lower goal rate for Peptamen AF to 60 ml/hr while on D5 and bicarb drip. Goal rate to provide 1584 kcals (1890 kcals while on D5 in bicarb at current rate), 100 g/protein, and 1069 ml free water. Flush 30 ml free water q 6 hrs.     The above end goal rate is for 22 hrs/day to assume interruptions for ADLs. Water flushes adjusted based on clinical picture + Rx flushes/IV fluids     RD to follow     CLINICAL NUTRITION ASSESSMENT      Reason for Assessment Follow-up Protocol     Diagnosis/Problem   generalized weakness. Pt has a hx of HLD, HTN, ETOH abusem COPD, CAD, hepatic steatosis.   Medical/Surgical History Past Medical History:   Diagnosis Date    Anxiety     Depression     Hyperlipidemia     Hypertension        History reviewed. No pertinent surgical history.     Anthropometrics        Current Height  Current Weight  BMI kg/m2 Height: 172.7 cm (67.99\")  Weight: 85.2 kg (187 lb 13.3 oz) (09/24/24 0518)  Body mass index is 28.57 kg/m².   Adjusted BMI (if applicable)    BMI Category Overweight (25 - 29.9)   Ideal Body Weight (IBW) 150 lbs   Usual Body Weight (UBW) 170 lbs   Weight Trend Stable   Weight History Wt Readings from Last 30 Encounters:   09/24/24 0518 85.2 kg (187 lb 13.3 oz)   09/23/24 2313 85.2 kg (187 lb 13.3 oz)   09/23/24 0502 84.2 kg (185 lb 10 oz)   09/21/24 0600 84.7 kg (186 lb 11.7 oz)   09/20/24 0400 80.9 kg (178 lb 5.6 oz)   09/19/24 0400 75.2 kg (165 lb 12.6 oz)   09/18/24 0952 78.5 kg (173 lb)   09/17/24 2341 78.5 kg (173 lb)    "     Estimated/Assessed Needs        Energy Requirements    Weight for Calculation 68 kg IBW   Method for Estimation  25-30 kcal/kg   EST Needs (kcal/day) 4240-5075       Protein Requirements    Weight for Calculation 68 kg IBW   EST Protein Needs (g/kg) 1.2 - 1.5 gm/kg   EST Daily Needs (g/day)        Fluid Requirements     Method for Estimation 1 mL/kcal    Estimated Needs (mL/day)        Fluid Deficit    Current Na Level (mEq/L)    Desired Na Level (mEq/L)    Estimated Fluid Deficit (L)       Labs       Pertinent Labs    Results from last 7 days   Lab Units 09/24/24  0351 09/23/24  0348 09/22/24  0322 09/21/24  0823 09/21/24  0427 09/20/24  0750 09/20/24  0457   SODIUM mmol/L 134* 134* 135*   < > 138   < > 140   POTASSIUM mmol/L 5.6* 3.5 3.7   < > 3.7   < > 3.0*   CHLORIDE mmol/L 102 101 104   < > 105   < > 105   CO2 mmol/L 19.8* 23.0 22.0   < > 20.2*   < > 23.0   BUN mg/dL 66* 44* 25*   < > 14   < > 37*   CREATININE mg/dL 2.69* 2.12* 1.37*   < > 0.78   < > 1.63*   CALCIUM mg/dL 7.5* 7.8* 7.7*   < > 7.4*   < > 7.3*   BILIRUBIN mg/dL 0.5  --   --   --  0.7  --  0.6   ALK PHOS U/L 309*  --   --   --  158*  --  95   ALT (SGPT) U/L 36  --   --   --  22  --  12   AST (SGOT) U/L 80*  --   --   --  78*  --  36   GLUCOSE mg/dL 115* 119* 130*   < > 75   < > 114*    < > = values in this interval not displayed.     Results from last 7 days   Lab Units 09/24/24  0351 09/23/24 0348 09/22/24  0322   MAGNESIUM mg/dL 1.9 1.7 2.0   PHOSPHORUS mg/dL 7.5* 3.6 3.7   HEMOGLOBIN g/dL 7.4* 7.5* 8.1*   HEMATOCRIT % 22.1* 22.7* 23.8*   WBC 10*3/mm3 23.55* 18.12* 18.59*   TRIGLYCERIDES mg/dL  --   --  402*   ALBUMIN g/dL 1.5* 1.7* 2.0*     Results from last 7 days   Lab Units 09/24/24  0351 09/23/24  2115 09/23/24  0348 09/22/24  0322 09/21/24  0427 09/20/24  0456 09/19/24  0534 09/18/24  1511   INR   --  1.55*  --   --   --   --   --  1.26*   PLATELETS 10*3/mm3 104*  --  73* 79* 80* 104*   < >  --     < > = values in this interval  not displayed.     COVID19   Date Value Ref Range Status   09/18/2024 Not Detected Not Detected - Ref. Range Final     Lab Results   Component Value Date    HGBA1C 4.9 07/16/2024          Medications           Scheduled Medications aspirin, 81 mg, Oral, Daily  atorvastatin, 20 mg, Oral, Nightly  [START ON 9/25/2024] cefepime, 2,000 mg, Intravenous, Q24H  chlorhexidine, 15 mL, Mouth/Throat, Q12H  citalopram, 20 mg, Nasogastric, Daily  folic acid, 1 mg, Nasogastric, Daily  heparin (porcine), 5,000 Units, Subcutaneous, Q8H  ipratropium-albuterol, 3 mL, Nebulization, 4x Daily - RT  levothyroxine, 25 mcg, Nasogastric, Q AM  metoprolol tartrate, 12.5 mg, Oral, Q12H  metroNIDAZOLE, 500 mg, Intravenous, Q8H  pantoprazole, 40 mg, Intravenous, Q24H  sodium chloride, 10 mL, Intravenous, Q12H  sodium zirconium cyclosilicate, 10 g, Oral, TID  thiamine, 100 mg, Nasogastric, Daily  Vancomycin Pharmacy Intermittent/Pulse Dosing, , Does not apply, Daily  [START ON 9/25/2024] vitamin D, 50,000 Units, Nasogastric, Q7 Days       Infusions dexmedetomidine, 0.2-1.5 mcg/kg/hr, Last Rate: 1.2 mcg/kg/hr (09/24/24 0939)  norepinephrine, 0.02-0.3 mcg/kg/min, Last Rate: 0.18 mcg/kg/min (09/24/24 0513)  Pharmacy to dose vancomycin,   sodium bicarbonate 150 mEq in D5W, 75 mL/hr, Last Rate: 75 mL/hr (09/24/24 1004)  vasopressin, 0.03 Units/min, Last Rate: 0.03 Units/min (09/24/24 0634)       PRN Medications   acetaminophen **OR** acetaminophen **OR** acetaminophen    albuterol    aluminum-magnesium hydroxide-simethicone    senna-docusate sodium **AND** polyethylene glycol **AND** bisacodyl **AND** bisacodyl    Calcium Replacement - Follow Nurse / BPA Driven Protocol    dextrose    dextrose    fentaNYL citrate (PF)    glucagon (human recombinant)    heparin (porcine)    heparin    Magnesium Standard Dose Replacement - Follow Nurse / BPA Driven Protocol    ondansetron ODT **OR** ondansetron    Pharmacy to dose vancomycin    Phosphorus Replacement -  Follow Nurse / BPA Driven Protocol    sodium chloride     Physical Findings          General Findings ventilator support   Oral/Mouth Cavity dental caries   Edema  2+ (mild)   Gastrointestinal last bowel movement: 9/21   Skin  skin intact   Tubes/Drains/Lines Cortrak, dialysis catheter, NG tube, bridle in place   NFPE Not indicated at this time     Current Nutrition Orders & Evaluation of Intake       Oral Nutrition     Food Allergies NKFA   Current PO Diet NPO Diet NPO Type: Strict NPO   Supplement n/a   PO Evaluation     % PO Intake NPO    Factors Affecting Intake: Other: Intubated      Enteral Nutrition     Enteral Route NG    TF Delivery Method Continuous HELD per surgery    Propofol Rate/Kcal     Current TF Order/Rate  Peptamen Intense VHP @ 50 mL/hr    TF Goal Rate 50 mL/hr    Current Water Flush 50 mL Q 1 hr    Modular None    TF Residual  other: HELD    TF Tolerance other: HELD    TF Observation Verified TF held at this time     PES STATEMENT / NUTRITION DIAGNOSIS      Nutrition Dx Problem  Problem: Needs Alternative Composition  Etiology: Medical Diagnosis - generalized weakness. Pt has a hx of HLD, HTN, ETOH abusem COPD, CAD, hepatic steatosis.    Signs/Symptoms: Report/Observation   --  NUTRITION INTERVENTION / PLAN OF CARE      Intervention Goal(s) Maintain nutrition status, Establish goals of care, Meet estimated needs, Initiate TF/PN, and Tolerate TF/PN at goal         RD Intervention/Action Await initiation of EN/PN, Continue to monitor, Care plan reviewed, and Recommend/order: EN         Prescription/Orders:       PO Diet       Supplements       Enteral Nutrition    Enteral Prescription:     Enteral Route NG    TF Delivery Method Continuous    Enteral Product Peptamen AF    Modular None    Propofol Rate/Kcal 22.6 ml/hr 596 kcals    TF Start Rate  50 mL/hr    TF Goal Rate  60 mL/hr    Free Water Flush 30 mL Q 6 hr    Provision at Goal:          Calories 1584 kcals (1890 while on D5 in bicarb at  current rate) meets 100% needs         Protein  100 gm protein, meets 100% needs         Fluid (mL) 1069 mL free water + 120 mL in flushes         Parenteral Nutrition    New Prescription Ordered? Yes   --      Monitor/Evaluation Per protocol, Pertinent labs, EN delivery/tolerance, Weight, Skin status, GI status, Symptoms, POC/GOC, Swallow function   Discharge Plan/Needs No discharge needs identified at this time     RD to follow per protocol.      Electronically signed by:  Mayo Pérez RDN, LD  09/24/24 12:55 EDT

## 2024-09-24 NOTE — CONSULTS
Name: Dixon Montoya ADMIT: 2024   : 1955  PCP: Jaquelin Rueda APRN    MRN: [id number] LOS: 5 days   AGE/SEX: 68 y.o. male  ROOM: [facility]     Inpatient Vascular Surgery Consult  Consult performed by: Vielka Cherry MD  Consult ordered by: Kvng Rg MD Ashlee Ann Vinyard, MD     LOS: 5 days   Patient Care Team:  Jaquelin Rueda APRN as PCP - General (Nurse Practitioner)    Subjective     Chief complaint: consult for possible chronic aortic dissection    History of Present Illness  68 y.o. male with a history of HTN, HLD, CAD s/p PCI, ischemic cardiomyopathy with an LVEF of 30%, COPD, and alcohol abuse who was admitted to [facility] on  24 with generalized weakness and dizziness.  He became quite ill due to pneumonia and UTI, requiring intubation.  He had SHANA and required a course of CRRT.  He is now off hemodialysis.  He is now intubated and febrile to 105 F.  Daughters and nurse are at bedside and provide most of the history, along with the chart.  He is not currently on any pressors.  He had a CT of the chest, abdomen, and pelvis without contrast to evaluate for a source of infection earlier today.  This showed displaced infrarenal abdominal aortic calcifications and we were consulted for evaluation for possible dissection.  Daughters deny any family history of connective tissue disorders, specifically no Nola Danlos or Marfan's syndrome.  They also deny history of aneurysmal disease.  He has never been diagnosed with a dissection in the past to their knowledge.  They are not sure if he was taking his prescribed medications leading up to his hospitalization.      Review of Systems   Unable to perform ROS: Intubated       Past Medical History:   Diagnosis Date    Anxiety     Depression     Hyperlipidemia     Hypertension        History reviewed. No pertinent surgical history.    History reviewed. No pertinent family history.      Social History     Tobacco Use     Smoking status: Every Day     Current packs/day: 1.00     Types: Cigarettes    Smokeless tobacco: Never   Vaping Use    Vaping status: Never Used   Substance Use Topics    Alcohol use: Yes     Alcohol/week: 4.0 standard drinks of alcohol     Types: 4 Shots of liquor per week    Drug use: Yes     Types: Marijuana       Allergies: Patient has no known allergies.    Medications Prior to Admission   Medication Sig Dispense Refill Last Dose    lisinopril (PRINIVIL,ZESTRIL) 30 MG tablet Take 1 tablet by mouth Daily.       loratadine (CLARITIN) 10 MG tablet Take 1 tablet by mouth Daily.       metoprolol succinate XL (TOPROL-XL) 25 MG 24 hr tablet Take 1 tablet by mouth Daily.       potassium chloride (KLOR-CON M10) 10 MEQ CR tablet Take 1 tablet by mouth.       rosuvastatin (CRESTOR) 40 MG tablet Take 1 tablet by mouth Daily.       aspirin 81 MG EC tablet Take 1 tablet by mouth Daily.       citalopram (CeleXA) 40 MG tablet Take 1 tablet by mouth Daily.       folic acid (FOLVITE) 1 MG tablet Take 1 tablet by mouth Daily.        aspirin, 81 mg, Oral, Daily  atorvastatin, 20 mg, Oral, Nightly  cefepime, 2,000 mg, Intravenous, Q12H  chlorhexidine, 15 mL, Mouth/Throat, Q12H  citalopram, 20 mg, Nasogastric, Daily  folic acid, 1 mg, Nasogastric, Daily  heparin (porcine), 5,000 Units, Subcutaneous, Q8H  ibuprofen, 400 mg, Oral, Once  ipratropium-albuterol, 3 mL, Nebulization, 4x Daily - RT  levothyroxine, 25 mcg, Nasogastric, Q AM  metoprolol tartrate, 12.5 mg, Oral, Q12H  metroNIDAZOLE, 500 mg, Intravenous, Q8H  pantoprazole, 40 mg, Intravenous, Q24H  sodium chloride, 10 mL, Intravenous, Q12H  thiamine, 100 mg, Nasogastric, Daily  vancomycin, 20 mg/kg, Intravenous, Once  [START ON 9/25/2024] vitamin D, 50,000 Units, Nasogastric, Q7 Days      dexmedetomidine, 0.2-1.5 mcg/kg/hr, Last Rate: 1 mcg/kg/hr (09/23/24 1846)  Pharmacy to dose vancomycin,   phenylephrine, 0.5-3 mcg/kg/min, Last Rate: Stopped (09/23/24 0823)  vasopressin,  0.03 Units/min        acetaminophen **OR** acetaminophen **OR** acetaminophen    albuterol    aluminum-magnesium hydroxide-simethicone    senna-docusate sodium **AND** polyethylene glycol **AND** bisacodyl **AND** bisacodyl    Calcium Replacement - Follow Nurse / BPA Driven Protocol    dextrose    dextrose    fentaNYL citrate (PF)    glucagon (human recombinant)    heparin (porcine)    heparin    Magnesium Standard Dose Replacement - Follow Nurse / BPA Driven Protocol    ondansetron ODT **OR** ondansetron    Pharmacy to dose vancomycin    Phosphorus Replacement - Follow Nurse / BPA Driven Protocol    sodium chloride      Objective   Temp:  [98.8 °F (37.1 °C)-104.9 °F (40.5 °C)] 104.9 °F (40.5 °C)  Heart Rate:  [] 118  Resp:  [24-28] 25  BP: ()/(46-94) 102/59  FiO2 (%):  [40 %-51 %] 41 %    No intake/output data recorded.    Physical Exam  Vitals reviewed.   Constitutional:       Appearance: He is ill-appearing.      Interventions: He is sedated and intubated.   HENT:      Head: Normocephalic and atraumatic.      Right Ear: External ear normal.      Left Ear: External ear normal.      Nose: Nose normal.   Eyes:      Conjunctiva/sclera: Conjunctivae normal.   Cardiovascular:      Rate and Rhythm: Regular rhythm. Tachycardia present.      Pulses:           Radial pulses are 2+ on the right side and 2+ on the left side.        Femoral pulses are 2+ on the right side and 2+ on the left side.       Dorsalis pedis pulses are 1+ on the right side and 1+ on the left side.        Posterior tibial pulses are 1+ on the right side and 1+ on the left side.      Comments: Legs warm, not mottled  Abdomen distended but soft, no guarding, no peritoneal signs  Unable to perform neuro exam due to sedation  Pulmonary:      Effort: He is intubated.      Comments: Non labored respirations on room air, equal chest rise  Abdominal:      General: Abdomen is flat. There is no distension.      Palpations: Abdomen is soft.    Musculoskeletal:      Right lower leg: No edema.      Left lower leg: No edema.   Skin:     General: Skin is warm and dry.      Capillary Refill: Capillary refill takes less than 2 seconds.   Neurological:      General: No focal deficit present.      Mental Status: He is oriented to person, place, and time.   Psychiatric:         Mood and Affect: Mood normal.         Behavior: Behavior normal.         Results from last 7 days   Lab Units 09/23/24  0348 09/22/24  0322 09/21/24  0427 09/20/24  1610 09/20/24  0456 09/19/24  1048 09/19/24  0534 09/19/24  0056 09/18/24  1511 09/18/24  1202 09/18/24  0609   WBC 10*3/mm3 18.12* 18.59* 17.47*  --  14.68*  --  20.92*  --   --  22.71* 22.43*   HEMOGLOBIN g/dL 7.5* 8.1* 7.7* 7.9* 6.6*  --  7.9* 7.7*   < > 8.1* 6.4*   PLATELETS 10*3/mm3 73* 79* 80*  --  104* 162 128*  --   --  139* 124*    < > = values in this interval not displayed.     Results from last 7 days   Lab Units 09/23/24  0348 09/22/24  0322 09/21/24  1758 09/21/24  0823 09/21/24  0427 09/21/24  0044 09/20/24  1610   SODIUM mmol/L 134* 135* 135* 135* 138 137 138   POTASSIUM mmol/L 3.5 3.7 3.8 3.9 3.7 3.7 3.7   CHLORIDE mmol/L 101 104 103 103 105 105 106   CO2 mmol/L 23.0 22.0 21.0* 21.0* 20.2* 21.5* 21.0*   BUN mg/dL 44* 25* 18 12 14 14 20   CREATININE mg/dL 2.12* 1.37* 1.07 0.64* 0.78 0.84 0.98   GLUCOSE mg/dL 119* 130* 116* 95 75 79 70   Estimated Creatinine Clearance: 35.7 mL/min (A) (by C-G formula based on SCr of 2.12 mg/dL (H)).  Results from last 7 days   Lab Units 09/18/24  1511   PROTIME Seconds 16.1*   INR  1.26*       Imaging Studies:   This result has not been signed. Information might be incomplete.     Encounter Date    9/17/24    CT Abdomen Pelvis Without Contrast [TGZ620] (Order [id number])  CT Chest Without Contrast Diagnostic [[id number]] (Order [id number])  Order  Status: Preliminary result     Patient Location    Patient Class Location   Inpatient [facility], [facility] [place] [facility], 1 [phone]      Study Notes: CT Abdomen Pelvis Without Contrast (Order #: [id number] Accession #: [id number])     Kiya Lewis on 9/23/2024  3:44 PM EDT   I382  Fever, weakness  Spesis  PNA  Pt unable to raise arms above head     Study Notes: CT Chest Without Contrast Diagnostic (Order #: [id number] Accession #: [id number])     Kiya Lewis on 9/23/2024  3:44 PM EDT   I382  Fever, weakness  Spesis  PNA  Pt unable to raise arms above head     Appointment Information    PACS Images     Radiology Images  Study Result    Narrative & Impression   CT ABDOMEN PELVIS WO CONTRAST-, CT CHEST WO CONTRAST DIAGNOSTIC-     HISTORY: Urinary tract infection.     TECHNIQUE: Radiation dose reduction techniques were utilized, including  automated exposure control and exposure modulation based on body size.  CT of the chest, abdomen, and pelvis includes axial imaging from the  thoracic inlet through the trochanters without intravenous contrast and  oral contrast.     COMPARISON: None     FINDINGS:  CHEST: There are extensive coronary artery calcifications. Endotracheal  tube tip is 4.5 cm above the ayana. Feeding tube is present. There is  mild mediastinal jennie enlargement. An AP window lymph node measures 1.2  cm short axis. Mildly enlarged subcarinal lymph node is present. No  axillary jennie enlargement.     Small-to-moderate bilateral pleural effusions are present, larger on the  right and there is dense adjacent lower lobe atelectasis with  consolidation. There is also abnormal patchy, hazy groundglass densities  within both upper and lower lobes due to infiltrates or asymmetric  edema. There is moderate pulmonary emphysema. Old healed left posterior  rib fractures are present. There is multilevel endplate spur formation  within the thoracic spine.     ABDOMEN/PELVIS: There is streak artifact overlying the upper abdomen  limiting evaluation. Gallbladder is mildly distended. The liver, spleen,  adrenal glands, pancreas appear within  normal limits.     There is a posterolateral left renal mass that appears to be solid and  measures approximately 4.4 x 3.8 cm suspicious for neoplasm. There is no  hydronephrosis. No ureteral stone. Urinary bladder is mostly  decompressed. Carroll catheter is present within the urinary bladder.     There is stranding/inflammation within the fat of the right upper  quadrant seen adjacent to the gallbladder and along the hepatic flexure  and these findings are suspicious for cholecystitis. Hepatic flexure  colitis less likely. Mild edema in the paracolic gutters. Generalized  body wall edema.     IMPRESSION:  1. Gallbladder is mildly distended and there is stranding and  inflammation within the fat of the right upper quadrant suspicious for  cholecystitis. Stranding also extends along the hepatic flexure and  focal colitis is less likely. No biliary ductal dilatation is evident.  Further evaluation with right upper quadrant sonogram may be helpful.  2. Solid-appearing left posterolateral renal mass measuring 4.4 x 3.8 cm  suspicious for neoplasm and recommend follow-up with multiphasic CT or  MRI when patient can tolerate.  3. Small-to-moderate bilateral pleural effusions, larger on the right  with adjacent lower lobe atelectasis and consolidation. Patchy bilateral  hazy groundglass opacities in both upper and lower lobe suspicious for  mild infiltrates. Asymmetric edema is possible.  4. Endotracheal tube and feeding tubes are present.  5. Diffuse atherosclerotic disease. There are displaced intimal  calcifications involving the abdominal aorta beginning at the origin of  the right renal artery and extending into the infrarenal abdominal aorta  suspicious for a abdominal aortic dissection and this may be chronic  though there are no previous studies for comparison. CT angiogram of  chest, abdomen, pelvis would be the best means to further evaluate.     Discussed with Mariela, the nurse came for the patient on 09/23/2024  at  4:15 p.m.     Radiation dose reduction techniques were utilized, including automated  exposure control and exposure modulation based on body size.         This result has not been signed. Information might be incomplet           Data Points:  During this visit the following were done:  Labs Reviewed [x]    Labs Ordered []    Radiology Reports Reviewed [x]    Radiology Images Reviewed []    Radiology Ordered []    EKG, echo, and/or stress test reviewed []    Vascular lab results reviewed  [x]    Vascular lab images reviewed and interpreted per myself   []    Referring Provider Records Reviewed []    ER Records Reviewed []    Hospital Records Reviewed/Summarized [x]    History Obtained From Family [x]    Radiological images view and Interpreted per myself [x]    Case Discussed with referring provider [x]     Decision to obtain and request outside records  []    Total data points reviewed: 7      Active Hospital Problems    Diagnosis  POA    **Sepsis [A41.9]  Yes    SHANA (acute kidney injury) [N17.9]  Yes    Hypokalemia [E87.6]  Unknown    CAD (coronary artery disease) [I25.10]  Unknown    Alcohol use disorder [F10.90]  Unknown    Macrocytic anemia [D53.9]  Unknown      Resolved Hospital Problems   No resolved problems to display.         Assessment & Plan       Sepsis    SHANA (acute kidney injury)    Hypokalemia    CAD (coronary artery disease)    Alcohol use disorder    Macrocytic anemia        68 y.o. male who is acutely septic with fevers to 105 F, previously treated for pneumonia and UTI during this hospitalization, with incidental finding of eccentric aortic calcifications on CT scan.  We were consulted for evaluation for aortic dissection    -I personally and independently reviewed the CT of the chest, abdomen, and pelvis from earlier today.  He has diffuse atherosclerosis of the thoracic and abdominal aorta, iliac vessels, and femoral vessels.  Thoracic aorta measures 3.2 cm in greatest diameter, infrarenal  aorta measures 2.7 cm in greatest diameter.  There is apparent severe stenosis of bilateral external iliac arteries.  There are eccentric calcifications of the infrarenal abdominal aorta and what appears to be a calcified dissection flap in the infrarenal abdominal aorta, which terminates above the iliac bifurcation. There is very little soft plaque.  This appears to be a chronic dissection vs chronic atherosclerosis or chronic ABRAM.  I discussed with his daughters that I would like to get contrasted imaging of this at some point to delineate the anatomy better, but that can be deferred,  He recently recovered from SHANA and was on CRRT during this hospitalization so I would defer contrasted imaging of his aorta until he stabilizes.    -He is acutely ill due to sepsis, which is unrelated to his chronic aortic atherosclerosis and dissection. He does not have any evidence of malperfusion, although he does have PAD (not surprising given his long smoking history).  I would recommend resuming an antiplatelet and statin when able for medical management.      I discussed the patients findings and my recommendations with family and nursing staff.  Please call my office with any question: [phone]    Vielka Cherry MD  09/23/24  20:04 EDT

## 2024-09-25 NOTE — PROGRESS NOTES
[facility] Progress Note      Patient Name: Dixon Montoya  : 1955  MRN: [id number]  Primary Care Physician:  Jaquelin Rueda, ADARSH  Date of admission: 2024    Subjective     Interval History:   F/u SHANA    Review of Systems:   Patient was treated started on CRRT on 2024.  Currently intubated sedated on pressors but pressors requirement seems to be improving.  Continues to be febrile.  Was found to have acute cholecystitis status post cholecystostomy tube placement yesterday.  He continues to be critically ill  Objective     Vitals:   Temp:  [96.8 °F (36 °C)-101.9 °F (38.8 °C)] 96.8 °F (36 °C)  Heart Rate:  [71-96] 96  Resp:  [21-25] 22  BP: ()/(52-86) 93/59  FiO2 (%):  [40 %-60 %] 50 %    Intake/Output Summary (Last 24 hours) at 2024 0948  Last data filed at 2024 0900  Gross per 24 hour   Intake 0 ml   Output 2707.2 ml   Net -2707.2 ml       Physical Exam:    General Appearance: frail WM sedated on ventilator and CRRT  Neck: RIJ shiley in place, no JVD  Lungs: coarse BS bilat  Heart: RRR, normal S1 and S2  Abdomen: Cholecystostomy tube noted.  No guarding.  : chase with approx 150 cc urine  Extremities: 1+ BLE edema, mottled bilateral lower extremities    Scheduled Meds:     aspirin, 81 mg, Oral, Daily  atorvastatin, 20 mg, Oral, Nightly  bisacodyl, 10 mg, Rectal, Daily  calcium gluconate, 1,000 mg, Intravenous, Once  cefepime, 2,000 mg, Intravenous, Q8H  chlorhexidine, 15 mL, Mouth/Throat, Q12H  citalopram, 20 mg, Nasogastric, Daily  folic acid, 1 mg, Nasogastric, Daily  heparin (porcine), 5,000 Units, Subcutaneous, Q8H  ipratropium-albuterol, 3 mL, Nebulization, 4x Daily - RT  levothyroxine, 25 mcg, Nasogastric, Q AM  metoprolol tartrate, 12.5 mg, Oral, Q12H  metroNIDAZOLE, 500 mg, Intravenous, Q8H  pantoprazole, 40 mg, Intravenous, Q24H  sodium chloride, 10 mL, Intravenous, Q12H  sodium zirconium cyclosilicate, 10 g, Oral, Once  thiamine, 100 mg,  Nasogastric, Daily  vancomycin, 750 mg, Intravenous, Q12H  Vancomycin Pharmacy Intermittent/Pulse Dosing, , Does not apply, Daily  vitamin D, 50,000 Units, Nasogastric, Q7 Days      IV Meds:   dexmedetomidine, 0.2-1.5 mcg/kg/hr, Last Rate: 1.5 mcg/kg/hr (09/25/24 0943)  norepinephrine, 0.02-0.3 mcg/kg/min, Last Rate: 0.17 mcg/kg/min (09/25/24 0754)  Pharmacy to dose vancomycin,   Phoxillum BK4/2.5, 2,000 mL/hr, Last Rate: 1,500 mL/hr (09/25/24 0914)  Phoxillum BK4/2.5, 2,000 mL/hr, Last Rate: 1,500 mL/hr (09/25/24 0914)  Phoxillum BK4/2.5, 2,000 mL/hr, Last Rate: 1,500 mL/hr (09/25/24 0914)  vasopressin, 0.03 Units/min, Last Rate: Stopped (09/25/24 0754)        Results Reviewed:   I have personally reviewed the results from the time of this admission to 9/25/2024 09:48 EDT     Results from last 7 days   Lab Units 09/25/24  0809 09/24/24  2342 09/24/24  1752 09/24/24  1434 09/24/24  0351 09/21/24  0823 09/21/24  0427 09/20/24  0750 09/20/24  0457   SODIUM mmol/L 138 136 138   < > 134*   < > 138   < > 140   POTASSIUM mmol/L 6.1* 5.7* 5.9*   < > 5.6*   < > 3.7   < > 3.0*   CHLORIDE mmol/L 105 101 101   < > 102   < > 105   < > 105   CO2 mmol/L 18.0* 20.7* 20.4*   < > 19.8*   < > 20.2*   < > 23.0   BUN mg/dL 32* 52* 76*   < > 66*   < > 14   < > 37*   CREATININE mg/dL 1.21 1.97* 2.76*   < > 2.69*   < > 0.78   < > 1.63*   CALCIUM mg/dL 6.6* 6.9* 6.8*   < > 7.5*   < > 7.4*   < > 7.3*   BILIRUBIN mg/dL  --   --   --   --  0.5  --  0.7  --  0.6   ALK PHOS U/L  --   --   --   --  309*  --  158*  --  95   ALT (SGPT) U/L  --   --   --   --  36  --  22  --  12   AST (SGOT) U/L  --   --   --   --  80*  --  78*  --  36   GLUCOSE mg/dL 36* 81 139*   < > 115*   < > 75   < > 114*    < > = values in this interval not displayed.     Estimated Creatinine Clearance: 62.9 mL/min (by C-G formula based on SCr of 1.21 mg/dL).  Results from last 7 days   Lab Units 09/25/24  0809 09/24/24  2342 09/24/24  1752   MAGNESIUM mg/dL 2.5* 2.3 2.1    PHOSPHORUS mg/dL 7.1* 7.4* 9.4*         Results from last 7 days   Lab Units 09/25/24  0354 09/24/24  0351 09/23/24  0348 09/22/24  0322 09/21/24  0427   WBC 10*3/mm3 25.70* 23.55* 18.12* 18.59* 17.47*   HEMOGLOBIN g/dL 7.3* 7.4* 7.5* 8.1* 7.7*   PLATELETS 10*3/mm3 125* 104* 73* 79* 80*     Results from last 7 days   Lab Units 09/23/24  2115 09/18/24  1511   INR  1.55* 1.26*       Assessment / Plan     ASSESSMENT:  Olig -> non olig SHANA.  Likely ATN, was on CRRt that was stopped on 9/21/2023., as UOP now robust (2.5L/24h),   Acute hypoxemic, hypercapnic respiratory failure.  On the ventilator followed by pulmonary.  Chest x-ray showed congestion  Vol overload  Septic shock - PNA/UTI acute cholecystitis followed by ID, abx per ID   HFrEF, EF 35% by echo 9/18/24  Anemia, transfused on 9/20;   Thrombocytopenia  AG metabolic acidosis improved on bicarbonate drip on CRRT.  Currently alkalotic  Left olecranon fracture  Hx ETOH abuse   Mild hyponatremia   Hyperkalemia on CRRT.  Could be secondary to severe hypoglycemia.  Patient was given already 1 above D50  Severe hypocalcemia  Acute cholecystitis status post cholecystostomy tube      PLAN:  Will DC IV bicarbonate to minimize volume administration.  Attempt to UF with CRRT  Start [facility] for hyperkalemia and increase CRRT prescription with increasing fluid replacement on CRRT.  Replace calcium  Will give 2 g of IV glucose calcium gluconate  Recheck labs in 2 hours  Poor prognosis    Silva Bush MD  09/25/24  09:48 EDT    [facility]  [phone]

## 2024-09-25 NOTE — CONSULTS
"Nutrition Services    Patient Name:  Dixon Montoya  YOB: 1955  MRN: [id number]  Admit Date:  9/17/2024    Assessment Date:  09/25/24    Summary: Follow up  Pt remains intubated and sedated on precedex. Currently on 1 pressor. Restarted on CRRT. TF restarted this morning at 10 ml/hr, pt seems to be tolerating well. Nephrology discontinued Bicarb in D5 drip.   Na 134, K 5.5, Mag 2.5, Phos 7.1    RECS:  Please increase goal rate for Peptamen AF to 70 ml/hr while on D5 and bicarb drip. Goal rate to provide 1848 kcals, 117 g/protein, and 1248 ml free water. Flush 30 ml free water q 6 hrs. Please flush 1 packet  of prosource liquid protein BID to provide an additonal 120 kcals and 30 g/protein.     The above end goal rate is for 22 hrs/day to assume interruptions for ADLs. Water flushes adjusted based on clinical picture + Rx flushes/IV fluids     RD to follow     CLINICAL NUTRITION ASSESSMENT      Reason for Assessment Follow-up Protocol     Diagnosis/Problem   generalized weakness. Pt has a hx of HLD, HTN, ETOH abusem COPD, CAD, hepatic steatosis.   Medical/Surgical History Past Medical History:   Diagnosis Date    Anxiety     Depression     Hyperlipidemia     Hypertension      History reviewed. No pertinent surgical history.     Anthropometrics        Current Height  Current Weight  BMI kg/m2 Height: 172.7 cm (67.99\")  Weight: 85.2 kg (187 lb 13.3 oz) (09/24/24 0518)  Body mass index is 28.57 kg/m².   Adjusted BMI (if applicable)    BMI Category Overweight (25 - 29.9)   Ideal Body Weight (IBW) 150 lbs   Usual Body Weight (UBW) 170 lbs   Weight Trend Stable   Weight History Wt Readings from Last 30 Encounters:   09/24/24 0518 85.2 kg (187 lb 13.3 oz)   09/23/24 2313 85.2 kg (187 lb 13.3 oz)   09/23/24 0502 84.2 kg (185 lb 10 oz)   09/21/24 0600 84.7 kg (186 lb 11.7 oz)   09/20/24 0400 80.9 kg (178 lb 5.6 oz)   09/19/24 0400 75.2 kg (165 lb 12.6 oz)   09/18/24 0952 78.5 kg (173 lb)   09/17/24 2341 78.5 kg " (173 lb)        Estimated/Assessed Needs        Energy Requirements    Weight for Calculation 68 kg IBW   Method for Estimation  25-30 kcal/kg   EST Needs (kcal/day) 2215-7602       Protein Requirements    Weight for Calculation 68 kg IBW   EST Protein Needs (g/kg) gm/kg, other:2-2.5   EST Daily Needs (g/day) 136-170       Fluid Requirements     Method for Estimation 1 mL/kcal    Estimated Needs (mL/day)        Fluid Deficit    Current Na Level (mEq/L)    Desired Na Level (mEq/L)    Estimated Fluid Deficit (L)       Labs       Pertinent Labs    Results from last 7 days   Lab Units 09/25/24  1131 09/25/24  0809 09/24/24  2342 09/24/24  1434 09/24/24  0351 09/21/24  0823 09/21/24  0427 09/20/24  0750 09/20/24  0457   SODIUM mmol/L 134* 138 136   < > 134*   < > 138   < > 140   POTASSIUM mmol/L 5.5* 6.1* 5.7*   < > 5.6*   < > 3.7   < > 3.0*   CHLORIDE mmol/L 103 105 101   < > 102   < > 105   < > 105   CO2 mmol/L 20.1* 18.0* 20.7*   < > 19.8*   < > 20.2*   < > 23.0   BUN mg/dL 25* 32* 52*   < > 66*   < > 14   < > 37*   CREATININE mg/dL 0.99 1.21 1.97*   < > 2.69*   < > 0.78   < > 1.63*   CALCIUM mg/dL 6.7* 6.6* 6.9*   < > 7.5*   < > 7.4*   < > 7.3*   BILIRUBIN mg/dL  --   --   --   --  0.5  --  0.7  --  0.6   ALK PHOS U/L  --   --   --   --  309*  --  158*  --  95   ALT (SGPT) U/L  --   --   --   --  36  --  22  --  12   AST (SGOT) U/L  --   --   --   --  80*  --  78*  --  36   GLUCOSE mg/dL 56* 36* 81   < > 115*   < > 75   < > 114*    < > = values in this interval not displayed.     Results from last 7 days   Lab Units 09/25/24  1131 09/25/24  0809 09/25/24  0354 09/24/24  2342 09/23/24  0348 09/22/24  0322   MAGNESIUM mg/dL 2.5* 2.5*  --  2.3   < > 2.0   PHOSPHORUS mg/dL 7.1* 7.1*  --  7.4*   < > 3.7   HEMOGLOBIN g/dL  --   --  7.3*  --    < > 8.1*   HEMATOCRIT %  --   --  21.4*  --    < > 23.8*   WBC 10*3/mm3  --   --  25.70*  --    < > 18.59*   TRIGLYCERIDES mg/dL  --   --   --   --   --  402*   ALBUMIN g/dL 1.9*  1.9*  --  1.9*   < > 2.0*    < > = values in this interval not displayed.     Results from last 7 days   Lab Units 09/25/24  0354 09/24/24  0351 09/23/24  2115 09/23/24  0348 09/22/24  0322 09/21/24  0427   INR   --   --  1.55*  --   --   --    PLATELETS 10*3/mm3 125* 104*  --  73* 79* 80*     COVID19   Date Value Ref Range Status   09/18/2024 Not Detected Not Detected - Ref. Range Final     Lab Results   Component Value Date    HGBA1C 4.9 07/16/2024          Medications           Scheduled Medications aspirin, 81 mg, Oral, Daily  atorvastatin, 20 mg, Oral, Nightly  bisacodyl, 10 mg, Rectal, Daily  cefepime, 2,000 mg, Intravenous, Q8H  chlorhexidine, 15 mL, Mouth/Throat, Q12H  citalopram, 20 mg, Nasogastric, Daily  folic acid, 1 mg, Nasogastric, Daily  heparin (porcine), 5,000 Units, Subcutaneous, Q8H  ipratropium-albuterol, 3 mL, Nebulization, 4x Daily - RT  levothyroxine, 25 mcg, Nasogastric, Q AM  metoprolol tartrate, 12.5 mg, Oral, Q12H  metroNIDAZOLE, 500 mg, Intravenous, Q8H  pantoprazole, 40 mg, Intravenous, Q24H  sodium chloride, 10 mL, Intravenous, Q12H  thiamine, 100 mg, Nasogastric, Daily  vancomycin, 750 mg, Intravenous, Q12H  Vancomycin Pharmacy Intermittent/Pulse Dosing, , Does not apply, Daily  vitamin D, 50,000 Units, Nasogastric, Q7 Days       Infusions dexmedetomidine, 0.2-1.5 mcg/kg/hr, Last Rate: 1.5 mcg/kg/hr (09/25/24 1322)  norepinephrine, 0.02-0.3 mcg/kg/min, Last Rate: 0.24 mcg/kg/min (09/25/24 1520)  norepinephrine, 0.02-0.3 mcg/kg/min, Last Rate: Stopped (09/25/24 1520)  Pharmacy to dose vancomycin,   Phoxillum BK4/2.5, 2,000 mL/hr, Last Rate: 2,000 mL/hr (09/25/24 1206)  Phoxillum BK4/2.5, 2,000 mL/hr, Last Rate: 2,000 mL/hr (09/25/24 1206)  Phoxillum BK4/2.5, 2,000 mL/hr, Last Rate: 2,000 mL/hr (09/25/24 1205)  vasopressin, 0.03 Units/min, Last Rate: Stopped (09/25/24 0444)       PRN Medications   acetaminophen **OR** acetaminophen **OR** acetaminophen    albuterol    aluminum-magnesium  hydroxide-simethicone    senna-docusate sodium **AND** polyethylene glycol **AND** bisacodyl **AND** bisacodyl    Calcium Replacement - Follow Nurse / BPA Driven Protocol    dextrose    dextrose    fentaNYL citrate (PF)    glucagon (human recombinant)    heparin (porcine)    heparin (porcine)    heparin    Magnesium Standard Dose Replacement - Follow Nurse / BPA Driven Protocol    ondansetron ODT **OR** ondansetron    Pharmacy to dose vancomycin    Phosphorus Replacement - Follow Nurse / BPA Driven Protocol    Potassium Replacement - Follow Nurse / BPA Driven Protocol    sodium chloride     Physical Findings          General Findings ventilator support   Oral/Mouth Cavity dental caries   Edema  2+ (mild)   Gastrointestinal last bowel movement: 9/21   Skin  skin intact   Tubes/Drains/Lines Cortrak, dialysis catheter, NG tube, bridle in place   NFPE Not indicated at this time     Current Nutrition Orders & Evaluation of Intake       Oral Nutrition     Food Allergies NKFA   Current PO Diet NPO Diet NPO Type: Strict NPO   Supplement n/a   PO Evaluation     % PO Intake NPO    Factors Affecting Intake: Other: Intubated      Enteral Nutrition     Enteral Route NG    TF Delivery Method Continuous     Propofol Rate/Kcal     Current TF Order/Rate  Peptamen AF @ 10 mL/hr    TF Goal Rate 60 mL/hr    Current Water Flush 30 mL Q 6 hr    Modular None    TF Residual  residual wnl     TF Tolerance tolerating     TF Observation Verified correct TF and water flush infusing per orders     PES STATEMENT / NUTRITION DIAGNOSIS      Nutrition Dx Problem  Problem: Needs Alternative Composition  Etiology: Medical Diagnosis - generalized weakness. Pt has a hx of HLD, HTN, ETOH abusem COPD, CAD, hepatic steatosis.    Signs/Symptoms: Report/Observation   --  NUTRITION INTERVENTION / PLAN OF CARE      Intervention Goal(s) Maintain nutrition status, Establish goals of care, Meet estimated needs, Initiate TF/PN, and Tolerate TF/PN at goal          RD Intervention/Action Await initiation of EN/PN, Continue to monitor, Care plan reviewed, and Recommend/order: EN         Prescription/Orders:       PO Diet       Supplements       Enteral Nutrition    Enteral Prescription:     Enteral Route NG    TF Delivery Method Continuous    Enteral Product Peptamen AF    Modular Liquid Protein, BID    Propofol Rate/Kcal     TF Start Rate  50 mL/hr    TF Goal Rate  60 mL/hr    Free Water Flush 30 mL Q 6 hr    Provision at Goal:          Calories 2068 meets 100% needs         Protein  147 gm protein, meets 100% needs         Fluid (mL) 1248 mL free water + 120 mL in flushes         Parenteral Nutrition    New Prescription Ordered? Yes   --      Monitor/Evaluation Per protocol, Pertinent labs, EN delivery/tolerance, Weight, Skin status, GI status, Symptoms, POC/GOC, Swallow function   Discharge Plan/Needs No discharge needs identified at this time     RD to follow per protocol.      Electronically signed by:  Mayo Pérez RDN, LD  09/25/24 16:33 EDT

## 2024-09-25 NOTE — CONSULTS
"Nutrition Services    Patient Name:  Dixon Montoya  YOB: 1955  MRN: [id number]  Admit Date:  9/17/2024    Assessment Date:  09/25/24    Summary: Follow up  Pt remains intubated and sedated on precedex. Currently on 1 pressor. Restarted on CRRT. TF restarted this morning at 10 ml/hr, pt seems to be tolerating well. Nephrology discontinued Bicarb in D5 drip.   Na 134, K 5.5, Mag 2.5, Phos 7.1    RD will continue to monitor labs and evaluate need for renal TF formula.    RECS:  Please increase goal rate for Peptamen AF to 70 ml/hr while on D5 and bicarb drip. Goal rate to provide 1848 kcals, 117 g/protein, and 1248 ml free water. Flush 30 ml free water q 6 hrs. Please flush 1 packet  of prosource liquid protein BID to provide an additonal 120 kcals and 30 g/protein.     The above end goal rate is for 22 hrs/day to assume interruptions for ADLs. Water flushes adjusted based on clinical picture + Rx flushes/IV fluids     RD to follow     CLINICAL NUTRITION ASSESSMENT      Reason for Assessment Follow-up Protocol     Diagnosis/Problem   generalized weakness. Pt has a hx of HLD, HTN, ETOH abusem COPD, CAD, hepatic steatosis.   Medical/Surgical History Past Medical History:   Diagnosis Date    Anxiety     Depression     Hyperlipidemia     Hypertension      History reviewed. No pertinent surgical history.     Anthropometrics        Current Height  Current Weight  BMI kg/m2 Height: 172.7 cm (67.99\")  Weight: 85.2 kg (187 lb 13.3 oz) (09/24/24 0518)  Body mass index is 28.57 kg/m².   Adjusted BMI (if applicable)    BMI Category Overweight (25 - 29.9)   Ideal Body Weight (IBW) 150 lbs   Usual Body Weight (UBW) 170 lbs   Weight Trend Stable   Weight History Wt Readings from Last 30 Encounters:   09/24/24 0518 85.2 kg (187 lb 13.3 oz)   09/23/24 2313 85.2 kg (187 lb 13.3 oz)   09/23/24 0502 84.2 kg (185 lb 10 oz)   09/21/24 0600 84.7 kg (186 lb 11.7 oz)   09/20/24 0400 80.9 kg (178 lb 5.6 oz)   09/19/24 0400 75.2 kg " (165 lb 12.6 oz)   09/18/24 0952 78.5 kg (173 lb)   09/17/24 2341 78.5 kg (173 lb)        Estimated/Assessed Needs        Energy Requirements    Weight for Calculation 68 kg IBW   Method for Estimation  25-30 kcal/kg   EST Needs (kcal/day) 3724-6471       Protein Requirements    Weight for Calculation 68 kg IBW   EST Protein Needs (g/kg) gm/kg, other:2-2.5   EST Daily Needs (g/day) 136-170       Fluid Requirements     Method for Estimation 1 mL/kcal    Estimated Needs (mL/day)        Fluid Deficit    Current Na Level (mEq/L)    Desired Na Level (mEq/L)    Estimated Fluid Deficit (L)       Labs       Pertinent Labs    Results from last 7 days   Lab Units 09/25/24  1131 09/25/24  0809 09/24/24  2342 09/24/24  1434 09/24/24  0351 09/21/24  0823 09/21/24  0427 09/20/24  0750 09/20/24  0457   SODIUM mmol/L 134* 138 136   < > 134*   < > 138   < > 140   POTASSIUM mmol/L 5.5* 6.1* 5.7*   < > 5.6*   < > 3.7   < > 3.0*   CHLORIDE mmol/L 103 105 101   < > 102   < > 105   < > 105   CO2 mmol/L 20.1* 18.0* 20.7*   < > 19.8*   < > 20.2*   < > 23.0   BUN mg/dL 25* 32* 52*   < > 66*   < > 14   < > 37*   CREATININE mg/dL 0.99 1.21 1.97*   < > 2.69*   < > 0.78   < > 1.63*   CALCIUM mg/dL 6.7* 6.6* 6.9*   < > 7.5*   < > 7.4*   < > 7.3*   BILIRUBIN mg/dL  --   --   --   --  0.5  --  0.7  --  0.6   ALK PHOS U/L  --   --   --   --  309*  --  158*  --  95   ALT (SGPT) U/L  --   --   --   --  36  --  22  --  12   AST (SGOT) U/L  --   --   --   --  80*  --  78*  --  36   GLUCOSE mg/dL 56* 36* 81   < > 115*   < > 75   < > 114*    < > = values in this interval not displayed.     Results from last 7 days   Lab Units 09/25/24  1131 09/25/24  0809 09/25/24  0354 09/24/24  2342 09/23/24  0348 09/22/24  0322   MAGNESIUM mg/dL 2.5* 2.5*  --  2.3   < > 2.0   PHOSPHORUS mg/dL 7.1* 7.1*  --  7.4*   < > 3.7   HEMOGLOBIN g/dL  --   --  7.3*  --    < > 8.1*   HEMATOCRIT %  --   --  21.4*  --    < > 23.8*   WBC 10*3/mm3  --   --  25.70*  --    < > 18.59*    TRIGLYCERIDES mg/dL  --   --   --   --   --  402*   ALBUMIN g/dL 1.9* 1.9*  --  1.9*   < > 2.0*    < > = values in this interval not displayed.     Results from last 7 days   Lab Units 09/25/24  0354 09/24/24  0351 09/23/24  2115 09/23/24  0348 09/22/24  0322 09/21/24  0427   INR   --   --  1.55*  --   --   --    PLATELETS 10*3/mm3 125* 104*  --  73* 79* 80*     COVID19   Date Value Ref Range Status   09/18/2024 Not Detected Not Detected - Ref. Range Final     Lab Results   Component Value Date    HGBA1C 4.9 07/16/2024          Medications           Scheduled Medications aspirin, 81 mg, Oral, Daily  atorvastatin, 20 mg, Oral, Nightly  bisacodyl, 10 mg, Rectal, Daily  cefepime, 2,000 mg, Intravenous, Q8H  chlorhexidine, 15 mL, Mouth/Throat, Q12H  citalopram, 20 mg, Nasogastric, Daily  folic acid, 1 mg, Nasogastric, Daily  heparin (porcine), 5,000 Units, Subcutaneous, Q8H  ipratropium-albuterol, 3 mL, Nebulization, 4x Daily - RT  levothyroxine, 25 mcg, Nasogastric, Q AM  metoprolol tartrate, 12.5 mg, Oral, Q12H  metroNIDAZOLE, 500 mg, Intravenous, Q8H  pantoprazole, 40 mg, Intravenous, Q24H  sodium chloride, 10 mL, Intravenous, Q12H  thiamine, 100 mg, Nasogastric, Daily  vancomycin, 750 mg, Intravenous, Q12H  Vancomycin Pharmacy Intermittent/Pulse Dosing, , Does not apply, Daily  vitamin D, 50,000 Units, Nasogastric, Q7 Days       Infusions dexmedetomidine, 0.2-1.5 mcg/kg/hr, Last Rate: 1.5 mcg/kg/hr (09/25/24 1322)  norepinephrine, 0.02-0.3 mcg/kg/min, Last Rate: 0.24 mcg/kg/min (09/25/24 1520)  norepinephrine, 0.02-0.3 mcg/kg/min, Last Rate: Stopped (09/25/24 1520)  Pharmacy to dose vancomycin,   Phoxillum BK4/2.5, 2,000 mL/hr, Last Rate: 2,000 mL/hr (09/25/24 1206)  Phoxillum BK4/2.5, 2,000 mL/hr, Last Rate: 2,000 mL/hr (09/25/24 1206)  Phoxillum BK4/2.5, 2,000 mL/hr, Last Rate: 2,000 mL/hr (09/25/24 1205)  vasopressin, 0.03 Units/min, Last Rate: Stopped (09/25/24 0754)       PRN Medications   acetaminophen  **OR** acetaminophen **OR** acetaminophen    albuterol    aluminum-magnesium hydroxide-simethicone    senna-docusate sodium **AND** polyethylene glycol **AND** bisacodyl **AND** bisacodyl    Calcium Replacement - Follow Nurse / BPA Driven Protocol    dextrose    dextrose    fentaNYL citrate (PF)    glucagon (human recombinant)    heparin (porcine)    heparin (porcine)    heparin    Magnesium Standard Dose Replacement - Follow Nurse / BPA Driven Protocol    ondansetron ODT **OR** ondansetron    Pharmacy to dose vancomycin    Phosphorus Replacement - Follow Nurse / BPA Driven Protocol    Potassium Replacement - Follow Nurse / BPA Driven Protocol    sodium chloride     Physical Findings          General Findings ventilator support   Oral/Mouth Cavity dental caries   Edema  2+ (mild)   Gastrointestinal last bowel movement: 9/21   Skin  skin intact   Tubes/Drains/Lines Cortrak, dialysis catheter, NG tube, bridle in place   NFPE Not indicated at this time     Current Nutrition Orders & Evaluation of Intake       Oral Nutrition     Food Allergies NKFA   Current PO Diet NPO Diet NPO Type: Strict NPO   Supplement n/a   PO Evaluation     % PO Intake NPO    Factors Affecting Intake: Other: Intubated      Enteral Nutrition     Enteral Route NG    TF Delivery Method Continuous     Propofol Rate/Kcal     Current TF Order/Rate  Peptamen AF @ 10 mL/hr    TF Goal Rate 60 mL/hr    Current Water Flush 30 mL Q 6 hr    Modular None    TF Residual  residual wnl     TF Tolerance tolerating     TF Observation Verified correct TF and water flush infusing per orders     PES STATEMENT / NUTRITION DIAGNOSIS      Nutrition Dx Problem  Problem: Needs Alternative Composition  Etiology: Medical Diagnosis - generalized weakness. Pt has a hx of HLD, HTN, ETOH abusem COPD, CAD, hepatic steatosis.    Signs/Symptoms: Report/Observation   --  NUTRITION INTERVENTION / PLAN OF CARE      Intervention Goal(s) Maintain nutrition status, Establish goals of  care, Meet estimated needs, Initiate TF/PN, and Tolerate TF/PN at goal         RD Intervention/Action Await initiation of EN/PN, Continue to monitor, Care plan reviewed, and Recommend/order: EN         Prescription/Orders:       PO Diet       Supplements       Enteral Nutrition    Enteral Prescription:     Enteral Route NG    TF Delivery Method Continuous    Enteral Product Peptamen AF    Modular Liquid Protein, BID    Propofol Rate/Kcal     TF Start Rate  50 mL/hr    TF Goal Rate  60 mL/hr    Free Water Flush 30 mL Q 6 hr    Provision at Goal:          Calories 2068 meets 100% needs         Protein  147 gm protein, meets 100% needs         Fluid (mL) 1248 mL free water + 120 mL in flushes         Parenteral Nutrition    New Prescription Ordered? Yes   --      Monitor/Evaluation Per protocol, Pertinent labs, EN delivery/tolerance, Weight, Skin status, GI status, Symptoms, POC/GOC, Swallow function   Discharge Plan/Needs No discharge needs identified at this time     RD to follow per protocol.      Electronically signed by:  Mayo Pérez RDN, LD  09/25/24 16:45 EDT

## 2024-09-25 NOTE — PROGRESS NOTES
"General Surgery Progress Note    CC: Septic shock, acute cholecystitis    S: Patient having better output of kali tube per nursing staff. Back on CRRT this morning. On levophed. No BM in several days per RN.      O:/64   Pulse 90   Temp 100.4 °F (38 °C) (Bladder)   Resp 23   Ht 172.7 cm (67.99\")   Wt 85.2 kg (187 lb 13.3 oz)   SpO2 96%   BMI 28.57 kg/m²     Intake & Output (last day)         09/24 0701 09/25 0700 09/25 0701 09/26 0700    P.O. 0 0    I.V. (mL/kg) 0 (0)     Blood  390    Other      NG/GT      IV Piggyback      Total Intake(mL/kg) 0 (0) 390 (4.6)    Urine (mL/kg/hr) 280 (0.1)     Drains 20     Dialysis 2069.9 3425.8    Total Output 2369.9 3425.8    Net -2369.9 -3035.8                  GENERAL: Intubated, critically ill, on CRRT  HEENT: ET tube in place  RESPIRATORY: On ventilator with 50% FiO2 and 5 of PEEP  CARDIOVASCULAR: tachycardic, on 0.17 Levophed, livedo reticularis of the bilateral lower extremities, SCDs in place  GASTROINTESTINAL: abdomen soft, nondistended, no guarding, percutaneous cholecystostomy tube in place with approximately 10 cc of thin black bile in the bulb, bile is able to be stripped through tubing  : chase in place    LABS  Results from last 7 days   Lab Units 09/25/24  1618 09/25/24  0354 09/24/24  0351 09/20/24  1610 09/20/24  0456   WBC 10*3/mm3 25.96* 25.70* 23.55*   < > 14.68*   HEMOGLOBIN g/dL 9.3* 7.3* 7.4*   < > 6.6*   HEMATOCRIT % 26.9* 21.4* 22.1*   < > 19.7*   PLATELETS 10*3/mm3 142 125* 104*   < > 104*   MONOCYTES % %  --   --   --   --  0.0*   EOSINOPHIL % %  --   --   --   --  0.0*    < > = values in this interval not displayed.     Results from last 7 days   Lab Units 09/25/24  1618 09/25/24  1131 09/25/24  0809 09/24/24  1434 09/24/24  0351 09/21/24  0823 09/21/24  0427 09/20/24  0750 09/20/24  0457   SODIUM mmol/L 137 134* 138   < > 134*   < > 138   < > 140   POTASSIUM mmol/L 5.0 5.5* 6.1*   < > 5.6*   < > 3.7   < > 3.0*   CHLORIDE mmol/L 104 " 103 105   < > 102   < > 105   < > 105   CO2 mmol/L 20.5* 20.1* 18.0*   < > 19.8*   < > 20.2*   < > 23.0   BUN mg/dL 21 25* 32*   < > 66*   < > 14   < > 37*   CREATININE mg/dL 0.88 0.99 1.21   < > 2.69*   < > 0.78   < > 1.63*   CALCIUM mg/dL 7.1* 6.7* 6.6*   < > 7.5*   < > 7.4*   < > 7.3*   BILIRUBIN mg/dL  --   --   --   --  0.5  --  0.7  --  0.6   ALK PHOS U/L  --   --   --   --  309*  --  158*  --  95   ALT (SGPT) U/L  --   --   --   --  36  --  22  --  12   AST (SGOT) U/L  --   --   --   --  80*  --  78*  --  36   GLUCOSE mg/dL 70 56* 36*   < > 115*   < > 75   < > 114*    < > = values in this interval not displayed.     Results from last 7 days   Lab Units 09/23/24  2115   INR  1.55*       Assessment and Plan:  Septic shock  Acute cholecystitis  Acute hypoxemic respiratory failure   Pneumonia  UTI  Alcohol abuse with withdrawal  Coronary artery disease s/p PCI  NSTEMI  Hypothyroidism  Anemia  SHANA  Possible underlying COPD with tobacco use  PVD  AGMA  Thrombocytopenia  Arm fracture  Heart failure systolic acute decompensated EF 30-35%    Continue percutaneous cholecystostomy tube drainage with flushing TID and stripping tube after flushing. Discussed with RN.   Follow up drain cultures - no growth at 2 days. Antibiotics per ID.    Dulcolax suppository today given no bowel movement noted in several days.  OK for trophic feeds per NGT.    Angeline Dodd MD  General, Robotic and Endoscopic Surgery  [facility]    [address], [address]  [place], [place], [address]  P: [phone]  F: [phone]

## 2024-09-25 NOTE — PROGRESS NOTES
ID note for sepsis  Fevers curve improved  On crrt  Pressors weaned, on levo alone now    Physical Exam:   Vital Signs   Temp:  [97.4 °F (36.3 °C)-101.9 °F (38.8 °C)] 97.4 °F (36.3 °C)  Heart Rate:  [71-96] 90  Resp:  [21-25] 21  BP: ()/(59-86) 97/62  FiO2 (%):  [40 %-60 %] 50 %  GENERAL: Ill-appearing  HEENT: Oropharynx with ET tube in place. Hearing is unable to assess  EYES: . No conjunctival injection. No lid lag.   GI: Soft, nontender, nondistended. No appreciable organomegaly.   SKIN: Warm and dry without cutaneous eruptions   PSYCHIATRIC: Sedated, no purposeful movements over sedation for me  Edematous    Results Review:  Creatinine 1.97  White count 25   CXR, independently interpreted: bilateral opacities, L>R, likely congestion      9/18 Ucx >100K Klebsiella oxytoca  9/18 Bcx 2/2 neg  9/18 rpp, strep/legionella neg  9/19 MRSA PCR neg  9/19 ETT cx c albicans  9/23 blood cultures 2/2 no growth  9/23 gallbladder culture no growth         A/p  Sepsis  Acute kidney injury  Acute hypoxic respiratory failure  Alcohol abuse disorder with withdrawal  Acute cholecystitis    He seems to be making progress.  Continue empiric renally dosed vancomycin, todaycefepime and metronidazole.   so far repeat blood culture along with gallbladder culture remain negative.  The kali tube seems to be functioning little bit better and I think that accounts for a lot of this progress.   Likely stop vancomycin tomorrow if cultures remain negative.

## 2024-09-25 NOTE — PROGRESS NOTES
"  Daily Progress Note.   [facility] INTENSIVE CARE  9/25/2024    Patient:  Name:  Dixon Montoya  MRN:  [id number]  1955  68 y.o.  male         CC: Critical care management    Interval History:  Maintains on mechanical ventilation.     Fever curve improved overnight  On crrt  On mech vent fio2 60%    Physical Exam:  /71   Pulse 77   Temp 98.7 °F (37.1 °C) (Bladder)   Resp 23   Ht 172.7 cm (67.99\")   Wt 85.2 kg (187 lb 13.3 oz)   SpO2 100%   BMI 28.57 kg/m²   Body mass index is 28.57 kg/m².    Intake/Output Summary (Last 24 hours) at 9/25/2024 0553  Last data filed at 9/25/2024 0500  Gross per 24 hour   Intake 0 ml   Output 2065.2 ml   Net -2065.2 ml   Vent Settings          Resp Rate (Set): 20  Pressure Support (cm H2O): 8 cm H20  FiO2 (%): 60 %  PEEP/CPAP (cm H2O): 5 cm H20    Minute Ventilation (L/min) (Obs): 15 L/min  Resp Rate (Observed) Vent: 23  I:E Ratio (Set): 1:2.03  I:E Ratio (Obs): 1:1.2    PIP Observed (cm H2O): 26 cm H2O  Plateau Pressure (cm H2O): 0 cm H2O  Driving Pressure (cm H2O): -4 cm H2O    General appearance: Ill on mechanical ventilation unresponsive will move an agitated manner at times  HENT: Positive ET tube positive right IJ Shiley  Lungs:bilateral air entry mechanical synchronous with mechanical respiration respiratory effort and no intercostal retractions  CV: RRR, no rub   Abdomen: Nonrigid positive: Drain  Extremities: Positive peripheral edema  left arm wrapped ecchymotic  Skin: Warm  Psych/neuro sedated limiting exam will not follow commands    Data Review:  Notable Labs:  Results from last 7 days   Lab Units 09/25/24  0354 09/24/24  0351 09/23/24  0348 09/22/24  0322 09/21/24  0427 09/20/24  1610 09/20/24  0456 09/19/24  1048 09/19/24  0534   WBC 10*3/mm3 25.70* 23.55* 18.12* 18.59* 17.47*  --  14.68*  --  20.92*   HEMOGLOBIN g/dL 7.3* 7.4* 7.5* 8.1* 7.7* 7.9* 6.6*  --  7.9*   PLATELETS 10*3/mm3 125* 104* 73* 79* 80*  --  104* 162 128*     Results from " last 7 days   Lab Units 09/24/24  2342 09/24/24  1752 09/24/24  1434 09/24/24  0351 09/23/24 0348 09/22/24 0322 09/21/24  1758 09/21/24  0823 09/21/24  0427 09/21/24  0044   SODIUM mmol/L 136 138 133* 134* 134* 135* 135* 135*   < > 137   POTASSIUM mmol/L 5.7* 5.9* 6.4* 5.6* 3.5 3.7 3.8 3.9   < > 3.7   CHLORIDE mmol/L 101 101 99 102 101 104 103 103   < > 105   CO2 mmol/L 20.7* 20.4* 18.0* 19.8* 23.0 22.0 21.0* 21.0*   < > 21.5*   BUN mg/dL 52* 76* 14 66* 44* 25* 18 12   < > 14   CREATININE mg/dL 1.97* 2.76* 3.15* 2.69* 2.12* 1.37* 1.07 0.64*   < > 0.84   GLUCOSE mg/dL 81 139* 158* 115* 119* 130* 116* 95   < > 79   CALCIUM mg/dL 6.9* 6.8* 7.2* 7.5* 7.8* 7.7* 7.6* 7.6*   < > 7.4*   MAGNESIUM mg/dL 2.3 2.1  --  1.9 1.7 2.0  --  2.2  --  2.2   PHOSPHORUS mg/dL 7.4* 9.4* 9.7* 7.5* 3.6 3.7  --  3.5  --  3.7    < > = values in this interval not displayed.   Estimated Creatinine Clearance: 38.7 mL/min (A) (by C-G formula based on SCr of 1.97 mg/dL (H)).    Results from last 7 days   Lab Units 09/25/24 0354 09/24/24 0351 09/23/24 0348 09/22/24 0322 09/21/24  0427 09/20/24  0457 09/19/24  1048 09/19/24  0534   LDH U/L  --   --   --   --   --   --   --  543*   AST (SGOT) U/L  --  80*  --   --  78* 36  --  28   ALT (SGPT) U/L  --  36  --   --  22 12  --  15   LACTATE mmol/L  --   --   --  0.9 1.2 1.5   < >  --    PLATELETS 10*3/mm3 125* 104* 73* 79* 80*  --    < > 128*    < > = values in this interval not displayed.       Results from last 7 days   Lab Units 09/25/24  0355 09/24/24  0609 09/24/24  0408 09/23/24  0435 09/22/24  0317 09/21/24  1039 09/20/24  0535 09/19/24  1307 09/19/24  0817 09/19/24  0637 09/18/24  1355   PH, ARTERIAL pH units 7.439 7.216* 7.180* 7.469* 7.352 7.483* 7.600* 7.207* 7.110* 7.051* 7.378   PCO2, ARTERIAL mm Hg 29.8* 54.4* 55.8* 33.6* 42.8 27.8* 25.1* 34.9* 25.4* 33.5* 17.2*   PO2 ART mm Hg 128.6* 150.1* 112.7* 79.9* 88.1 64.4* 90.5 108.9* 191.9* 50.7* 60.9*   HCO3 ART mmol/L 20.2* 22.1 20.8*  24.4 23.7 20.8* 24.6 13.8* 8.1* 9.3* 10.1*     Triglycerides 402      Imaging:  Reviewed chest images personally from past 3 days    ASSESSMENT  /  PLAN:  Septic shock  Acute hypoxemic respiratory failure mechanical ventilation reviewed  Pneumonia  UTI  Alcohol abuse  Coronary artery disease  NSTEMI with wma - cards evaluating managing  Hypothyroidism  Anemia  SHANA  Possible underlying COPD  PVD  AGMA  Thrombocytopenia - insetting of sepsis and etoh intake, continue to trend  Fracture of arm -ortho conulted, not really a candidate for any operative intervention right now., sling follow up 2 weeks  Heart failure systolic acute decompensated EF 35%  Chronic aortic dissection flap in infrarenal abd aorta -evaluated by Charlotte.  PAD - follow up with charlotte - statin and antiplt  Acute milli s/p milli tube via IR 9/23/24      Continue to monitor renal function renal replacement needs appreciate nephrology assistance -CRRT    Serial ABG chest x-rays mechanical ventilation reviewed adjustments as appropriate.  Oxygenation limiting SBT's - will need fluid removal hopefully possible with improvement in sepsis  DuoNebs    Off propofol given elevated trace of glycerides.  Precedex  Fentanyl as needed    Wean vasopressors as able  Transfuse one unit prbc    Thiamine  Folic acid      Flagyl/cefepime/vancomycin  Klebsiella oxytocin reviewed  Follow microbiology - gpc  Milli tube  Abx guided by ID    Dw family/sister at bedside, they are thinking goc.     Plan of care and patient course was reviewed with multidisciplinary team in morning rounds.       Total critical care time was 40 minutes, excluding any separately billable procedure time.  Time did not overlap with any other provider.      Electronically signed by Christoph Lizama MD, 09/25/24, 3:00 PM EDT.

## 2024-09-25 NOTE — CASE MANAGEMENT/SOCIAL WORK
Continued Stay Note  [facility]     Patient Name: Dixon Montoya  MRN: [id number]  Today's Date: 9/25/2024    Admit Date: 9/17/2024    Plan: Pending pt's progress during hospitalization: remains intubated & on CRRT   Discharge Plan       Row Name 09/25/24 1619       Plan    Plan Pending pt's progress during hospitalization: remains intubated & on CRRT    Patient/Family in Agreement with Plan yes    Plan Comments Pt discussed in multidisciplinary rounds. Clinicals reviewed. Pt not yet medically ready for DC; CCP continues to follow. DC plan pending pt's progress during hospitalization. Once extubated PT/OT/ST to be consulted once pt appropriate to work with them. Anticipate pt needing referrals to SNF vs IRF at DC. DC barriers: intubated, CRRT, Cortrak, precedex gtt, levophed gtt, transfusion 1 unit PRBC, restraints, & IV calcium gluconate. RYAN Vazquez/CCP           Yamilex Solorio RN

## 2024-09-25 NOTE — PROGRESS NOTES
"[facility] Clinical Pharmacy Services: Vancomycin Monitoring Note    Dixon Montoya is a 68 y.o. male who is on day 2/7 of pharmacy to dose vancomycin for Intra-Abdominal Infection.    Previous Vancomycin Dose:   intermittent dosing  Updated Cultures and Sensitivities:    - 9/19 MRSA nares negative   - 9/23 RespCx - +1 Candida albicans, no normal respiratory fang   - 9/23 BloodCx -NGTD   - 9/23 Anaerobic Gallbladder fluid Cx -pending   - 9/23 Gallbladder fluid Cx -NGTD    Results from last 7 days   Lab Units 09/24/24  0351 09/20/24  0954   VANCOMYCIN RM mcg/mL 26.80  --    VANCOMYCIN TR mcg/mL  --  9.10     Vitals/Labs  Ht: 172.7 cm (67.99\"); Wt: 85.2 kg (187 lb 13.3 oz)   Temp Readings from Last 1 Encounters:   09/25/24 99.2 °F (37.3 °C) (Bladder)     Estimated Creatinine Clearance: 38.7 mL/min (A) (by C-G formula based on SCr of 1.97 mg/dL (H)).   CRRT    Results from last 7 days   Lab Units 09/25/24  0354 09/24/24  2342 09/24/24  1752 09/24/24  1434 09/24/24  0351 09/23/24  0348   CREATININE mg/dL  --  1.97* 2.76* 3.15* 2.69* 2.12*   WBC 10*3/mm3 25.70*  --   --   --  23.55* 18.12*     Assessment/Plan  Started CRRT around 1700 last night 9/24. Will initiate appropriate CRRT vancomycin dosing.  Current Vancomycin Dose: Ordered vancomycin 750 mg Q12H for CRRT.  Next Level Date and Time: Vancomycin trough on 9/26 at 0800.  We will continue to monitor patient changes and renal function     Thank you for involving pharmacy in this patient's care. Please contact pharmacy with any questions or concerns.    Edyta Gunderson, [facility]  Clinical Pharmacist  "

## 2024-09-26 NOTE — PROGRESS NOTES
[facility] Progress Note      Patient Name: Dixon Montoya  : 1955  MRN: [id number]  Primary Care Physician:  Jaquelin Rueda, ADARSH  Date of admission: 2024    Subjective     Interval History:   F/u SHANA    Review of Systems:   Continues to be critically ill.  Had problems with CRRT this morning.  It had to be stopped oligoanuric.  On pressors  Objective     Vitals:   Temp:  [99.1 °F (37.3 °C)-101.5 °F (38.6 °C)] 101.5 °F (38.6 °C)  Heart Rate:  [] 103  Resp:  [20-26] 24  BP: ()/(49-81) 104/77  FiO2 (%):  [40 %-90 %] 60 %    Intake/Output Summary (Last 24 hours) at 2024 1615  Last data filed at 2024 0800  Gross per 24 hour   Intake 1993.45 ml   Output 3073.2 ml   Net -1079.75 ml       Physical Exam:    General Appearance: frail WM sedated on ventilator and CRRT  Neck: RIJ shiley in place, no JVD  Lungs: coarse BS bilat  Heart: RRR, normal S1 and S2  Abdomen: Cholecystostomy tube noted.  No guarding.  : chase with approx 150 cc urine  Extremities: 1+ BLE edema, mottled bilateral lower extremities    Scheduled Meds:     aspirin, 81 mg, Oral, Daily  bisacodyl, 10 mg, Rectal, Daily  [START ON 2024] cefepime, 2,000 mg, Intravenous, Q24H  chlorhexidine, 15 mL, Mouth/Throat, Q12H  citalopram, 20 mg, Nasogastric, Daily  folic acid, 1 mg, Nasogastric, Daily  heparin (porcine), 5,000 Units, Subcutaneous, Q8H  ipratropium-albuterol, 3 mL, Nebulization, 4x Daily - RT  levothyroxine, 25 mcg, Nasogastric, Q AM  metroNIDAZOLE, 500 mg, Intravenous, Q8H  pantoprazole, 40 mg, Intravenous, Q24H  sodium chloride, 10 mL, Intravenous, Q12H  thiamine, 100 mg, Nasogastric, Daily  vitamin D, 50,000 Units, Nasogastric, Q7 Days      IV Meds:   dexmedetomidine, 0.2-1.5 mcg/kg/hr, Last Rate: 1.5 mcg/kg/hr (24 3039)  norepinephrine, 0.02-0.3 mcg/kg/min, Last Rate: 0.16 mcg/kg/min (24 5769)  Phoxillum BK4/2.5, 2,000 mL/hr, Last Rate: 2,000 mL/hr (24  2113)  Phoxillum BK4/2.5, 2,000 mL/hr, Last Rate: 2,000 mL/hr (09/25/24 2113)  Phoxillum BK4/2.5, 2,000 mL/hr, Last Rate: 2,000 mL/hr (09/25/24 2113)        Results Reviewed:   I have personally reviewed the results from the time of this admission to 9/26/2024 16:15 EDT     Results from last 7 days   Lab Units 09/26/24  0556 09/25/24  2339 09/25/24  1618 09/24/24  1434 09/24/24  0351 09/21/24  0823 09/21/24  0427   SODIUM mmol/L 137 137 137   < > 134*   < > 138   POTASSIUM mmol/L 5.3* 4.8 5.0   < > 5.6*   < > 3.7   CHLORIDE mmol/L 106 105 104   < > 102   < > 105   CO2 mmol/L 19.0* 20.0* 20.5*   < > 19.8*   < > 20.2*   BUN mg/dL 20 18 21   < > 66*   < > 14   CREATININE mg/dL 0.67* 0.59* 0.88   < > 2.69*   < > 0.78   CALCIUM mg/dL 7.0* 7.0* 7.1*   < > 7.5*   < > 7.4*   BILIRUBIN mg/dL 2.1*  --   --   --  0.5  --  0.7   ALK PHOS U/L 313*  --   --   --  309*  --  158*   ALT (SGPT) U/L 368*  --   --   --  36  --  22   AST (SGOT) U/L 1,432*  --   --   --  80*  --  78*   GLUCOSE mg/dL 99 80 70   < > 115*   < > 75    < > = values in this interval not displayed.     Estimated Creatinine Clearance: 113.5 mL/min (A) (by C-G formula based on SCr of 0.67 mg/dL (L)).  Results from last 7 days   Lab Units 09/25/24  2339 09/25/24  1618 09/25/24  1131   MAGNESIUM mg/dL 2.4 2.5* 2.5*   PHOSPHORUS mg/dL 4.7* 5.5* 7.1*         Results from last 7 days   Lab Units 09/26/24  1139 09/26/24  0319 09/25/24  1618 09/25/24  0354 09/24/24  0351 09/23/24  0348   WBC 10*3/mm3  --  23.25* 25.96* 25.70* 23.55* 18.12*   HEMOGLOBIN g/dL 7.1* 8.8* 9.3* 7.3* 7.4* 7.5*   PLATELETS 10*3/mm3  --  133* 142 125* 104* 73*     Results from last 7 days   Lab Units 09/23/24  2115   INR  1.55*       Assessment / Plan     ASSESSMENT:  Olig -> non olig SHANA.  Likely ATN, was on CRRt that was stopped on 9/21/2023., as UOP now robust (2.5L/24h),   Acute hypoxemic, hypercapnic respiratory failure.  On the ventilator followed by pulmonary.  Chest x-ray showed  congestion  Vol overload  Septic shock - PNA/UTI acute cholecystitis followed by ID, abx per ID   HFrEF, EF 35% by echo 9/18/24  Anemia, transfused on 9/20;   Thrombocytopenia  AG metabolic acidosis improved on bicarbonate drip on CRRT.  Currently alkalotic  Left olecranon fracture  Hx ETOH abuse   Mild hyponatremia   Hyperkalemia on CRRT.  Could be secondary to severe hypoglycemia.  Patient was given already 1 above D50  Severe hypocalcemia  Acute cholecystitis status post cholecystostomy tube      PLAN:  Will start CRRT for metabolic clearance and volume removal  Poor prognosis  UF as tolerated  Labs in a.m.    Silva Bush MD  09/26/24  16:15 EDT    [facility]\""  [phone]

## 2024-09-26 NOTE — PROGRESS NOTES
"  Daily Progress Note.   [facility] INTENSIVE CARE  9/26/2024    Patient:  Name:  Dixon Montoya  MRN:  [id number]  1955  68 y.o.  male         CC: Critical care management, intubated 9-    Interval History:     Fever this morning 38.6     On mech vent fio2 50%    NE ongoing    Physical Exam:  /53   Pulse 76   Temp (!) 101.4 °F (38.6 °C) (Bladder)   Resp 22   Ht 172.7 cm (67.99\")   Wt 84.9 kg (187 lb 2.7 oz)   SpO2 94%   BMI 28.47 kg/m²   Body mass index is 28.47 kg/m².    Intake/Output Summary (Last 24 hours) at 9/26/2024 1004  Last data filed at 9/26/2024 0624  Gross per 24 hour   Intake 2343.45 ml   Output 5392.9 ml   Net -3049.45 ml   Vent Settings          Resp Rate (Set): 20  Pressure Support (cm H2O): 8 cm H20  FiO2 (%): 90 %  PEEP/CPAP (cm H2O): 5 cm H20    Minute Ventilation (L/min) (Obs): 14.9 L/min  Resp Rate (Observed) Vent: 25  I:E Ratio (Set): 1:2.03  I:E Ratio (Obs): 1:1.7    PIP Observed (cm H2O): 26 cm H2O  Plateau Pressure (cm H2O): 0 cm H2O  Driving Pressure (cm H2O): -4.1 cm H2O    General appearance: Ill on mechanical ventilation unresponsive will move an agitated manner at times  HENT: Positive ET tube positive right IJ Shiley  Lungs:bilateral air entry mechanical synchronous with mechanical respiration respiratory effort and no intercostal retractions  CV: RRR, no rub   Abdomen: Nonrigid positive: Drain black sludge soft  Extremities: Positive peripheral edema  left arm wrapped ecchymotic  Skin: Warm  Psych/neuro sedated limiting exam will not follow commands    Data Review:  Notable Labs:  Results from last 7 days   Lab Units 09/26/24  0319 09/25/24  1618 09/25/24  0354 09/24/24  0351 09/23/24  0348 09/22/24  0322 09/21/24  0427   WBC 10*3/mm3 23.25* 25.96* 25.70* 23.55* 18.12* 18.59* 17.47*   HEMOGLOBIN g/dL 8.8* 9.3* 7.3* 7.4* 7.5* 8.1* 7.7*   PLATELETS 10*3/mm3 133* 142 125* 104* 73* 79* 80*     Results from last 7 days   Lab Units 09/26/24  0556 " 09/25/24  2339 09/25/24  1618 09/25/24  1131 09/25/24  0809 09/24/24  2342 09/24/24  1752 09/24/24  1434 09/24/24  0351   SODIUM mmol/L 137 137 137 134* 138 136 138 133* 134*   POTASSIUM mmol/L 5.3* 4.8 5.0 5.5* 6.1* 5.7* 5.9* 6.4* 5.6*   CHLORIDE mmol/L 106 105 104 103 105 101 101 99 102   CO2 mmol/L 19.0* 20.0* 20.5* 20.1* 18.0* 20.7* 20.4* 18.0* 19.8*   BUN mg/dL 20 18 21 25* 32* 52* 76* 14 66*   CREATININE mg/dL 0.67* 0.59* 0.88 0.99 1.21 1.97* 2.76* 3.15* 2.69*   GLUCOSE mg/dL 99 80 70 56* 36* 81 139* 158* 115*   CALCIUM mg/dL 7.0* 7.0* 7.1* 6.7* 6.6* 6.9* 6.8* 7.2* 7.5*   MAGNESIUM mg/dL  --  2.4 2.5* 2.5* 2.5* 2.3 2.1  --  1.9   PHOSPHORUS mg/dL  --  4.7* 5.5* 7.1* 7.1* 7.4* 9.4* 9.7* 7.5*   Estimated Creatinine Clearance: 113.5 mL/min (A) (by C-G formula based on SCr of 0.67 mg/dL (L)).    Results from last 7 days   Lab Units 09/26/24  0556 09/26/24  0319 09/25/24  1618 09/25/24  0354 09/24/24  0351 09/23/24  0348 09/22/24  0322 09/21/24  0427 09/20/24  0457   AST (SGOT) U/L 1,432*  --   --   --  80*  --   --  78* 36   ALT (SGPT) U/L 368*  --   --   --  36  --   --  22 12   LACTATE mmol/L  --   --   --   --   --   --  0.9 1.2 1.5   PLATELETS 10*3/mm3  --  133* 142 125* 104*   < > 79* 80*  --     < > = values in this interval not displayed.       Results from last 7 days   Lab Units 09/26/24  0336 09/25/24  1314 09/25/24  0355 09/24/24  0609 09/24/24  0408 09/23/24  0435 09/22/24  0317 09/21/24  1039 09/20/24  0535 09/19/24  1307   PH, ARTERIAL pH units 7.377 7.392 7.439 7.216* 7.180* 7.469* 7.352 7.483* 7.600* 7.207*   PCO2, ARTERIAL mm Hg 34.9* 34.9* 29.8* 54.4* 55.8* 33.6* 42.8 27.8* 25.1* 34.9*   PO2 ART mm Hg 78.5* 91.7 128.6* 150.1* 112.7* 79.9* 88.1 64.4* 90.5 108.9*   HCO3 ART mmol/L 20.5* 21.2* 20.2* 22.1 20.8* 24.4 23.7 20.8* 24.6 13.8*     Triglycerides 402      Imaging:  Reviewed chest images personally from past 3 days    Cxr infiltrates, et tube high    Tte 9-:    Left ventricular  systolic function is moderately decreased. Left ventricular ejection fraction appears to be 36 - 40%.    The left ventricular cavity is mildly dilated.    The following left ventricular wall segments are akinetic: apical anterior, apical lateral, apical inferior, apical septal and apex.    Left ventricular diastolic function is consistent with (grade I) impaired relaxation.    The right ventricular cavity is small in size and hyperdynamic.    ASSESSMENT  /  PLAN:  Septic shock  Acute hypoxemic respiratory failure mechanical ventilation reviewed  Pneumonia  UTI  Alcohol abuse  Coronary artery disease  NSTEMI with wma - cards evaluating managing  Heart failure systolic and diastolic with wall motion abn.  Hypothyroidism  Anemia  SHANA  Possible underlying COPD  PVD  AGMA  Thrombocytopenia - insetting of sepsis and etoh intake, continue to trend  Fracture of arm -ortho conulted, not really a candidate for any operative intervention right now., sling follow up 2 weeks  Heart failure systolic acute decompensated EF 35%  Chronic aortic dissection flap in infrarenal abd aorta -evaluated by Charlotte.  PAD - follow up with charlotte - statin and antiplt  Acute milli s/p milli tube via IR 9/23/24  Transaminits - ? Gb? Hepatic congestion? Shock?  trend      Continue to monitor renal function renal replacement needs appreciate nephrology assistance -CRRT    Serial ABG chest x-rays mechanical ventilation reviewed adjustments as appropriate.  Oxygenation limiting SBT's - will need fluid removal but limited by septic shock  DuoNebs  Advance et tube1    Off propofol given elevated trace of glycerides.  Precedex  Fentanyl as needed    Wean vasopressors as able    Thiamine  Folic acid      Flagyl/cefepime/vancomycin  Klebsiella oxytocin reviewed  Follow microbiology - gpc  Milli tube  Abx guided by ID  RUQ US - transaminits     Plan of care and patient course was reviewed with multidisciplinary team in morning rounds.    Discussed goc  with daughter, she will discuss further with her brother and sister.  She is pretty sure pt wouldn't want trach peg if decision came down to that.     Total critical care time was 38 minutes, excluding any separately billable procedure time.  Time did not overlap with any other provider.        Electronically signed by Christoph Lizama MD, 09/26/24, 10:22 AM EDT.

## 2024-09-26 NOTE — NURSING NOTE
Cathflo instilled in [facility] at 1545.

## 2024-09-26 NOTE — PROGRESS NOTES
ID note for sepsis  Fevers curve improved  On crrt   on levo alone now  On 50 fio2  D/w rn and malou still with output but no ett secretions    Physical Exam:   Vital Signs   Temp:  [97.4 °F (36.3 °C)-101.4 °F (38.6 °C)] 101.4 °F (38.6 °C)  Heart Rate:  [75-98] 76  Resp:  [20-26] 22  BP: ()/(52-81) 101/53  FiO2 (%):  [40 %-90 %] 90 %  GENERAL: Ill-appearing  HEENT: Oropharynx with ET tube in place. Hearing is unable to assess  EYES: . No conjunctival injection. No lid lag.   GI: Soft, nontender, nondistended. No appreciable organomegaly.   SKIN: Warm and dry without cutaneous eruptions   PSYCHIATRIC: Sedated, no purposeful movements over sedation for me  Edematous    Results Review:  Creatinine 0.67  , total bilirubin 2.1, AST 1432  White count 25  CXR, independently interpreted: More pronounced right-sided opacity today      9/18 Ucx >100K Klebsiella oxytoca  9/18 Bcx 2/2 neg  9/18 rpp, strep/legionella neg  9/19 MRSA PCR neg  9/19 ETT cx c albicans  9/23 blood cultures 1/2 coagulase-negative staph  9/23 gallbladder culture no growth      A/p  Sepsis  Acute kidney injury  Acute hypoxic respiratory failure  Alcohol abuse disorder with withdrawal  Acute cholecystitis    Some progress but very ill.  His gallbladder culture remains no growth.  I suspect this has been sterilized due to prior antibiotics.  Blood culture with 1 out of 2 coag negative staph which is very likely contaminant.  I will stop the vancomycin today.  Continue renally dosed cefepime and metronidazole.

## 2024-09-26 NOTE — PLAN OF CARE
Goal Outcome Evaluation:   Pt remains on vent, Fio2 had to be increased to 60% during this shift.  OETT advanced to 28cm by RT per orders.  Attempted CRRT run but Pt's Shiley not functioning well.  Blue port very difficult to draw back on. Unable to return blood. Pt given cathflo but that was ineffective as blue port continues to be difficult to pull from. Family requests no more dialysis for this evening/overnight.  Will reevaluate in AM. Nephro and Pulm notified.  Temp has progressively increased over the course of the day to a t max ov 104.8. Tylenol  not effective.  Dr. Abraham notified and cooling blanket initiated.  Family has been at bedside.

## 2024-09-26 NOTE — CONSULTS
"Nutrition Services    Patient Name:  Dixon Montoya  YOB: 1955  MRN: [id number]  Admit Date:  9/17/2024    Assessment Date:  09/26/24    Summary: Follow up  Pt remains intubated and sedated on precedex. Currently on 1 pressor. Plan to continue on CRRT. Tf currently held for ultrasound per RN. Phos and K continue to be elevated. RD will switch to a renal formula for TF.   K 5.3, Mag 2.5, Phos 4.7    RECS:  Once appropriate, Initiate Novasource Renal at 45 ml/hr goal rate. Goal rate to provide 1980 kcals, 90 g/protein, and 713 ml free water. Please flush 30 ml free water q 4 hrs, Flush 1 packet prosource liquid protein to provide an additional 60 kcals and 15 g/protein.     The above end goal rate is for 22 hrs/day to assume interruptions for ADLs. Water flushes adjusted based on clinical picture + Rx flushes/IV fluids     RD to follow     CLINICAL NUTRITION ASSESSMENT      Reason for Assessment Follow-up Protocol     Diagnosis/Problem   generalized weakness. Pt has a hx of HLD, HTN, ETOH abusem COPD, CAD, hepatic steatosis.   Medical/Surgical History Past Medical History:   Diagnosis Date    Anxiety     Depression     Hyperlipidemia     Hypertension      History reviewed. No pertinent surgical history.     Anthropometrics        Current Height  Current Weight  BMI kg/m2 Height: 172.7 cm (67.99\")  Weight: 84.9 kg (187 lb 2.7 oz) (09/26/24 0600)  Body mass index is 28.47 kg/m².   Adjusted BMI (if applicable)    BMI Category Overweight (25 - 29.9)   Ideal Body Weight (IBW) 150 lbs   Usual Body Weight (UBW) 170 lbs   Weight Trend Stable   Weight History Wt Readings from Last 30 Encounters:   09/26/24 0600 84.9 kg (187 lb 2.7 oz)   09/24/24 0518 85.2 kg (187 lb 13.3 oz)   09/23/24 2313 85.2 kg (187 lb 13.3 oz)   09/23/24 0502 84.2 kg (185 lb 10 oz)   09/21/24 0600 84.7 kg (186 lb 11.7 oz)   09/20/24 0400 80.9 kg (178 lb 5.6 oz)   09/19/24 0400 75.2 kg (165 lb 12.6 oz)   09/18/24 0952 78.5 kg (173 lb) "   09/17/24 2341 78.5 kg (173 lb)        Estimated/Assessed Needs        Energy Requirements    Weight for Calculation 68 kg IBW   Method for Estimation  25-30 kcal/kg   EST Needs (kcal/day) 0678-7000       Protein Requirements    Weight for Calculation 68 kg IBW   EST Protein Needs (g/kg) gm/kg, other:2-2.5   EST Daily Needs (g/day) 136-170       Fluid Requirements     Method for Estimation 1 mL/kcal    Estimated Needs (mL/day)        Fluid Deficit    Current Na Level (mEq/L)    Desired Na Level (mEq/L)    Estimated Fluid Deficit (L)       Labs       Pertinent Labs    Results from last 7 days   Lab Units 09/26/24  0556 09/25/24  2339 09/25/24  1618 09/24/24  1434 09/24/24  0351 09/21/24  0823 09/21/24  0427   SODIUM mmol/L 137 137 137   < > 134*   < > 138   POTASSIUM mmol/L 5.3* 4.8 5.0   < > 5.6*   < > 3.7   CHLORIDE mmol/L 106 105 104   < > 102   < > 105   CO2 mmol/L 19.0* 20.0* 20.5*   < > 19.8*   < > 20.2*   BUN mg/dL 20 18 21   < > 66*   < > 14   CREATININE mg/dL 0.67* 0.59* 0.88   < > 2.69*   < > 0.78   CALCIUM mg/dL 7.0* 7.0* 7.1*   < > 7.5*   < > 7.4*   BILIRUBIN mg/dL 2.1*  --   --   --  0.5  --  0.7   ALK PHOS U/L 313*  --   --   --  309*  --  158*   ALT (SGPT) U/L 368*  --   --   --  36  --  22   AST (SGOT) U/L 1,432*  --   --   --  80*  --  78*   GLUCOSE mg/dL 99 80 70   < > 115*   < > 75    < > = values in this interval not displayed.     Results from last 7 days   Lab Units 09/26/24  1139 09/26/24  0556 09/26/24  0319 09/25/24  2339 09/25/24  1618 09/25/24  1131 09/23/24  0348 09/22/24  0322   MAGNESIUM mg/dL  --   --   --  2.4 2.5* 2.5*   < > 2.0   PHOSPHORUS mg/dL  --   --   --  4.7* 5.5* 7.1*   < > 3.7   HEMOGLOBIN g/dL 7.1*  --  8.8*  --  9.3*  --    < > 8.1*   HEMATOCRIT % 21.2*  --  25.6*  --  26.9*  --    < > 23.8*   WBC 10*3/mm3  --   --  23.25*  --  25.96*  --    < > 18.59*   TRIGLYCERIDES mg/dL  --   --   --   --   --   --   --  402*   ALBUMIN g/dL  --  1.6*  --  1.9* 2.0* 1.9*   < > 2.0*     < > = values in this interval not displayed.     Results from last 7 days   Lab Units 09/26/24  0319 09/25/24  1618 09/25/24  0354 09/24/24  0351 09/23/24 2115 09/23/24  0348   INR   --   --   --   --  1.55*  --    PLATELETS 10*3/mm3 133* 142 125* 104*  --  73*     COVID19   Date Value Ref Range Status   09/18/2024 Not Detected Not Detected - Ref. Range Final     Lab Results   Component Value Date    HGBA1C 4.9 07/16/2024          Medications           Scheduled Medications aspirin, 81 mg, Oral, Daily  bisacodyl, 10 mg, Rectal, Daily  [START ON 9/27/2024] cefepime, 2,000 mg, Intravenous, Q24H  chlorhexidine, 15 mL, Mouth/Throat, Q12H  citalopram, 20 mg, Nasogastric, Daily  folic acid, 1 mg, Nasogastric, Daily  heparin (porcine), 5,000 Units, Subcutaneous, Q8H  ipratropium-albuterol, 3 mL, Nebulization, 4x Daily - RT  levothyroxine, 25 mcg, Nasogastric, Q AM  metroNIDAZOLE, 500 mg, Intravenous, Q8H  pantoprazole, 40 mg, Intravenous, Q24H  sodium chloride, 10 mL, Intravenous, Q12H  thiamine, 100 mg, Nasogastric, Daily  vitamin D, 50,000 Units, Nasogastric, Q7 Days       Infusions dexmedetomidine, 0.2-1.5 mcg/kg/hr, Last Rate: 1.5 mcg/kg/hr (09/26/24 1235)  norepinephrine, 0.02-0.3 mcg/kg/min, Last Rate: 0.16 mcg/kg/min (09/26/24 0729)  Phoxillum BK4/2.5, 2,000 mL/hr, Last Rate: 2,000 mL/hr (09/25/24 2113)  Phoxillum BK4/2.5, 2,000 mL/hr, Last Rate: 2,000 mL/hr (09/25/24 2113)  Phoxillum BK4/2.5, 2,000 mL/hr, Last Rate: 2,000 mL/hr (09/25/24 2113)       PRN Medications   acetaminophen **OR** acetaminophen **OR** acetaminophen    albuterol    alteplase    aluminum-magnesium hydroxide-simethicone    senna-docusate sodium **AND** polyethylene glycol **AND** bisacodyl **AND** bisacodyl    Calcium Replacement - Follow Nurse / BPA Driven Protocol    dextrose    dextrose    fentaNYL citrate (PF)    glucagon (human recombinant)    Glycerin-Hypromellose-    heparin (porcine)    heparin (porcine)    heparin    Magnesium  Standard Dose Replacement - Follow Nurse / BPA Driven Protocol    ondansetron ODT **OR** ondansetron    Phosphorus Replacement - Follow Nurse / BPA Driven Protocol    Potassium Replacement - Follow Nurse / BPA Driven Protocol    sodium chloride     Physical Findings          General Findings ventilator support   Oral/Mouth Cavity dental caries   Edema  2+ (mild), 3+ (moderate)   Gastrointestinal last bowel movement: 9/21   Skin  skin intact   Tubes/Drains/Lines Cortrak, dialysis catheter, NG tube, bridle in place   NFPE Not indicated at this time     Current Nutrition Orders & Evaluation of Intake       Oral Nutrition     Food Allergies NKFA   Current PO Diet NPO Diet NPO Type: Strict NPO   Supplement n/a   PO Evaluation     % PO Intake NPO    Factors Affecting Intake: Other: Intubated      Enteral Nutrition     Enteral Route NG    TF Delivery Method Continuous     Propofol Rate/Kcal     Current TF Order/Rate  Peptamen AF @ 50 mL/hr    TF Goal Rate 60 mL/hr    Current Water Flush 30 mL Q 6 hr    Modular None    TF Residual  residual wnl     TF Tolerance tolerating     TF Observation Verified correct TF and water flush infusing per orders     PES STATEMENT / NUTRITION DIAGNOSIS      Nutrition Dx Problem  Problem: Needs Alternative Composition  Etiology: Medical Diagnosis - generalized weakness. Pt has a hx of HLD, HTN, ETOH abusem COPD, CAD, hepatic steatosis.    Signs/Symptoms: Report/Observation   --  NUTRITION INTERVENTION / PLAN OF CARE      Intervention Goal(s) Maintain nutrition status, Establish goals of care, Meet estimated needs, Initiate TF/PN, and Tolerate TF/PN at goal         RD Intervention/Action Await initiation of EN/PN, Continue to monitor, Care plan reviewed, and Recommend/order: EN         Prescription/Orders:       PO Diet       Supplements       Enteral Nutrition    Enteral Prescription:     Enteral Route NG    TF Delivery Method Continuous    Enteral Product Novasource Renal    Modular Liquid  Protein, Daily    Propofol Rate/Kcal     TF Start Rate  45 mL/hr    TF Goal Rate  45 mL/hr    Free Water Flush 30 mL Q 4 hr    Provision at Goal:          Calories 2040 meets 100% needs         Protein  105gm protein, meets 77% needs         Fluid (mL) 713 mL free water + 180 mL in flushes         Parenteral Nutrition    New Prescription Ordered? Yes   --      Monitor/Evaluation Per protocol, Pertinent labs, EN delivery/tolerance, Weight, Skin status, GI status, Symptoms, POC/GOC, Swallow function   Discharge Plan/Needs No discharge needs identified at this time     RD to follow per protocol.      Electronically signed by:  Mayo Pérez RDN, LD  09/26/24 14:27 EDT

## 2024-09-26 NOTE — PLAN OF CARE
Goal Outcome Evaluation:  Plan of Care Reviewed With: patient                   Pt is not following commands. Titrating levophed to keep MAP >65. CRRT stopped @0430 due to access pressure alarms, unable to return blood. Nephrology notified, and will assess in AM regarding restarting CRRT. Dialysis cath lumens hep locked.

## 2024-09-27 NOTE — PROGRESS NOTES
ID note for sepsis  Fevers curve worsened   on levo alone now  On 40 fio2      Physical Exam:   Vital Signs   Temp:  [100.5 °F (38.1 °C)-104.7 °F (40.4 °C)] 100.7 °F (38.2 °C)  Heart Rate:  [] 87  Resp:  [21-26] 25  BP: ()/(48-77) 95/55  FiO2 (%):  [40 %-60 %] 40 %  GENERAL: Ill-appearing  HEENT: Oropharynx with ET tube in place. Hearing is unable to assess  EYES: . No conjunctival injection. No lid lag.   GI: Soft, nontender, nondistended. No appreciable organomegaly.   SKIN: Warm and dry without cutaneous eruptions   PSYCHIATRIC: Sedated, no purposeful movements over sedation for me  Edematous    Results Review:  Creatinine 1.55  , total bilirubin 1.2,   White count 16  CXR, independently interpreted: ongoing congestion/consolidatins      9/18 Ucx >100K Klebsiella oxytoca  9/18 Bcx 2/2 neg  9/18 rpp, strep/legionella neg  9/19 MRSA PCR neg  9/19 ETT cx c albicans  9/23 blood cultures 1/2 coagulase-negative staph  9/23 gallbladder culture no growth      A/p  Sepsis  Acute kidney injury  Acute hypoxic respiratory failure  Alcohol abuse disorder with withdrawal  Acute cholecystitis    White count down but fevers improved remains on pressors and anuric.  Continue renally dosed cefepime and metronidazole.  Goals of care discussions ongoing.  Continue tube drainage

## 2024-09-27 NOTE — PROGRESS NOTES
"General Surgery Progress Note    CC: Septic shock, acute cholecystitis    S: Unable to obtain.     O:BP 93/60   Pulse 98   Temp (!) 104.7 °F (40.4 °C)   Resp 24   Ht 172.7 cm (67.99\")   Wt 84.9 kg (187 lb 2.7 oz)   SpO2 96%   BMI 28.47 kg/m²     Intake & Output (last day)         09/26 0701  09/27 0700    P.O.     I.V. (mL/kg) 530.4 (6.2)    Blood     NG/    IV Piggyback 100    Total Intake(mL/kg) 983.4 (11.6)    Urine (mL/kg/hr) 0 (0)    Drains 25    Dialysis     Total Output 25    Net +958.4                 GENERAL: Intubated, critically ill   HEENT: ET tube in place  RESPIRATORY: On ventilator with 60% FiO2 and 5 of PEEP  CARDIOVASCULAR: tachycardic, on low dose levophed  GASTROINTESTINAL: abdomen soft, mildly distended, no guarding, percutaneous cholecystostomy tube in place with approximately 5 cc of thin black bile in the bulb  : chase in place, off CRRT    LABS  Results from last 7 days   Lab Units 09/26/24  1139 09/26/24  0319 09/25/24  1618 09/25/24  0354 09/20/24  1610 09/20/24  0456   WBC 10*3/mm3  --  23.25* 25.96* 25.70*   < > 14.68*   HEMOGLOBIN g/dL 7.1* 8.8* 9.3* 7.3*   < > 6.6*   HEMATOCRIT % 21.2* 25.6* 26.9* 21.4*   < > 19.7*   PLATELETS 10*3/mm3  --  133* 142 125*   < > 104*   MONOCYTES % %  --   --   --   --   --  0.0*   EOSINOPHIL % %  --   --   --   --   --  0.0*    < > = values in this interval not displayed.     Results from last 7 days   Lab Units 09/26/24  1815 09/26/24  0556 09/25/24  2339 09/24/24  1434 09/24/24  0351 09/21/24  0823 09/21/24  0427   SODIUM mmol/L 136 137 137   < > 134*   < > 138   POTASSIUM mmol/L 5.5* 5.3* 4.8   < > 5.6*   < > 3.7   CHLORIDE mmol/L 106 106 105   < > 102   < > 105   CO2 mmol/L 21.7* 19.0* 20.0*   < > 19.8*   < > 20.2*   BUN mg/dL 38* 20 18   < > 66*   < > 14   CREATININE mg/dL 1.28* 0.67* 0.59*   < > 2.69*   < > 0.78   CALCIUM mg/dL 6.5* 7.0* 7.0*   < > 7.5*   < > 7.4*   BILIRUBIN mg/dL  --  2.1*  --   --  0.5  --  0.7   ALK PHOS U/L  --  " 313*  --   --  309*  --  158*   ALT (SGPT) U/L  --  368*  --   --  36  --  22   AST (SGOT) U/L  --  1,432*  --   --  80*  --  78*   GLUCOSE mg/dL 180* 99 80   < > 115*   < > 75    < > = values in this interval not displayed.     Results from last 7 days   Lab Units 09/23/24  2115   INR  1.55*       Assessment and Plan:  Septic shock  Acute cholecystitis  Acute hypoxemic respiratory failure   Pneumonia  UTI  Alcohol abuse with withdrawal  Coronary artery disease s/p PCI  NSTEMI  Hypothyroidism  Anemia  SHANA  Possible underlying COPD with tobacco use  PVD  AGMA  Thrombocytopenia  Arm fracture  Heart failure systolic acute decompensated EF 30-35%    Continue percutaneous cholecystostomy tube drainage with flushing TID and stripping tube after flushing.  Follow up drain cultures - no growth at 3 days. Antibiotics per ID.    Continue daily suppository. Monitor bowel function.    Angeline Dodd MD  General, Robotic and Endoscopic Surgery  [facility]    [address], [address]  [place], [place], [address]  P: [phone]  F: [phone]

## 2024-09-27 NOTE — CONSULTS
Purpose of the visit was to evaluate for: goals of care/advanced care planning and support for patient/family. Spoke with MD and RN as well as family and discussed palliative care, goals of care, care options, resuscitation status, [facility], and [facility] scattered bed status.      Assessment:  Patient is palliative care appropriate for inpatient care given (list diagnosis/symptoms):  septic shock requiring pressor support, CHF, NSTEMI, PNA, UTI, alcohol abuse, CAD, respiratory failure requiring mechanical ventilation. PPS 10%    Recommendations/Plan: Change code status to comfort measures only and gear all treatment options towards symptom management including compassionate extubation. Consult [facility] for planned inpatient admission, transfer to  if stable.     Other Comments: Spoke with patient's family at bedside, including three children, sister, and son in law. We discussed goals of care and treatment options in detail. Family has good understanding of situation and also of patient's wishes regarding end of life care. They are ready to proceed with comfort measures only. We discussed compassionate extubation, medications used to alleviate suffering and [facility]  consult. Answered all questions and provided support. Updated Dr. Lizama and RYAN Fonseca.

## 2024-09-27 NOTE — PROGRESS NOTES
Patient family met with palliative care.  After discussion they wish to proceed with comfort measures and terminal extubation    Electronically signed by Christoph Lizama MD, 09/27/24, 1:02 PM EDT.

## 2024-09-27 NOTE — PROGRESS NOTES
[facility] Progress Note      Patient Name: Dixon Montoya  : 1955  MRN: [id number]  Primary Care Physician:  Jaquelin Rueda, ADARSH  Date of admission: 2024    Subjective     Interval History:   F/u SHANA    Review of Systems:   The patient continues to be critically ill.  Continues to be on pressors will pressor marginal stable CRRT was stopped yesterday due to clotting and was not restarted.  Oxygenation and electrolytes are acceptable.  Long discussion with family about goals of care.  Palliative team was consulted to address goals of care.  He continues to be febrile and anuric  Objective     Vitals:   Temp:  [100.7 °F (38.2 °C)-104.7 °F (40.4 °C)] 100.7 °F (38.2 °C)  Heart Rate:  [] 87  Resp:  [21-26] 25  BP: ()/(48-77) 95/55  Flow (L/min):  [10] 10  FiO2 (%):  [40 %-60 %] 40 %    Intake/Output Summary (Last 24 hours) at 2024 0827  Last data filed at 2024 0626  Gross per 24 hour   Intake 2144.19 ml   Output 125 ml   Net 2019.19 ml       Physical Exam:    General Appearance: frail WM sedated on ventilator   Neck: RIJ shiley in place, no JVD  Lungs: coarse BS bilat  Heart: RRR, normal S1 and S2  Abdomen: Cholecystostomy tube noted.  No guarding.  : chase in place.  Tip of penis cyanotic  Extremities: 1+ BLE edema, mottled bilateral lower extremities and acrocyanosis    Scheduled Meds:     aspirin, 81 mg, Oral, Daily  bisacodyl, 10 mg, Rectal, Daily  calcium gluconate, 2,000 mg, Intravenous, Once  cefepime, 2,000 mg, Intravenous, Q24H  chlorhexidine, 15 mL, Mouth/Throat, Q12H  citalopram, 20 mg, Nasogastric, Daily  folic acid, 1 mg, Nasogastric, Daily  heparin (porcine), 5,000 Units, Subcutaneous, Q8H  insulin regular, 2-7 Units, Subcutaneous, Q6H  ipratropium-albuterol, 3 mL, Nebulization, 4x Daily - RT  levothyroxine, 25 mcg, Nasogastric, Q AM  metroNIDAZOLE, 500 mg, Intravenous, Q8H  pantoprazole, 40 mg, Intravenous, Q24H  sodium chloride, 10 mL,  Intravenous, Q12H  thiamine, 100 mg, Nasogastric, Daily  vitamin D, 50,000 Units, Nasogastric, Q7 Days      IV Meds:   dexmedetomidine, 0.2-1.5 mcg/kg/hr, Last Rate: 1.5 mcg/kg/hr (09/27/24 0654)  norepinephrine, 0.02-0.3 mcg/kg/min, Last Rate: 0.18 mcg/kg/min (09/27/24 0626)  Phoxillum BK4/2.5, 2,000 mL/hr, Last Rate: 2,000 mL/hr (09/25/24 2113)  Phoxillum BK4/2.5, 2,000 mL/hr, Last Rate: 2,000 mL/hr (09/25/24 2113)  Phoxillum BK4/2.5, 2,000 mL/hr, Last Rate: 2,000 mL/hr (09/25/24 2113)        Results Reviewed:   I have personally reviewed the results from the time of this admission to 9/27/2024 08:27 EDT     Results from last 7 days   Lab Units 09/27/24  0350 09/26/24  1815 09/26/24  0556 09/24/24  1434 09/24/24  0351   SODIUM mmol/L 134* 136 137   < > 134*   POTASSIUM mmol/L 4.7 5.5* 5.3*   < > 5.6*   CHLORIDE mmol/L 106 106 106   < > 102   CO2 mmol/L 19.0* 21.7* 19.0*   < > 19.8*   BUN mg/dL 50* 38* 20   < > 66*   CREATININE mg/dL 1.55* 1.28* 0.67*   < > 2.69*   CALCIUM mg/dL 6.7* 6.5* 7.0*   < > 7.5*   BILIRUBIN mg/dL 1.2  --  2.1*  --  0.5   ALK PHOS U/L 399*  --  313*  --  309*   ALT (SGPT) U/L 391*  --  368*  --  36   AST (SGOT) U/L 985*  --  1,432*  --  80*   GLUCOSE mg/dL 233* 180* 99   < > 115*    < > = values in this interval not displayed.     Estimated Creatinine Clearance: 49.1 mL/min (A) (by C-G formula based on SCr of 1.55 mg/dL (H)).  Results from last 7 days   Lab Units 09/27/24  0350 09/26/24  1815 09/25/24  2339 09/25/24  1618   MAGNESIUM mg/dL 2.5*  --  2.4 2.5*   PHOSPHORUS mg/dL  --  5.5* 4.7* 5.5*         Results from last 7 days   Lab Units 09/27/24  0350 09/26/24  1139 09/26/24  0319 09/25/24  1618 09/25/24  0354 09/24/24  0351   WBC 10*3/mm3 16.61*  --  23.25* 25.96* 25.70* 23.55*   HEMOGLOBIN g/dL 7.5* 7.1* 8.8* 9.3* 7.3* 7.4*   PLATELETS 10*3/mm3 93*  --  133* 142 125* 104*     Results from last 7 days   Lab Units 09/23/24  2115   INR  1.55*       Assessment / Plan     ASSESSMENT:  Olig  -> non olig SHANA.  Likely ATN, was on CRRt that was stopped on 9/21/2023., as UOP now robust (2.5L/24h),   Acute hypoxemic, hypercapnic respiratory failure.  On the ventilator followed by pulmonary.    Hyperkalemia resolved with CRRT and 1 dose of Lokelma yesterday  Vol overload improved with CRRT  Septic shock - PNA/UTI acute cholecystitis followed by ID, abx per ID   HFrEF, EF 35% by echo 9/18/24  Anemia, transfused on 9/20; hemoglobin dropping  Thrombocytopenia likely secondary to DIC  AG metabolic acidosis improved on bicarbonate drip on CRRT.  Currently alkalotic  Left olecranon fracture  Hx ETOH abuse   Mild hyponatremia   Hyperkalemia on CRRT.  Could be secondary to severe hypoglycemia.  Patient was given already 1 above D50  Severe hypocalcemia  Acute cholecystitis status post cholecystostomy tube      PLAN:  No indication for dialysis today.    Will continue to assess need for dialysis on daily basis.  Poor prognosis  Discussed with family and nursing staff  Awaiting palliative team recommendation today.    Surveillance labs    Silva Bush MD  09/27/24  08:27 EDT    [facility]  [phone]

## 2024-09-27 NOTE — CASE MANAGEMENT/SOCIAL WORK
Continued Stay Note  [facility]     Patient Name: Dixon Montoya  MRN: [id number]  Today's Date: 9/27/2024    Admit Date: 9/17/2024    Plan: Pending pt's progress during hospitalization: remains intubated   Discharge Plan       Row Name 09/27/24 1030       Plan    Plan Pending pt's progress during hospitalization: remains intubated    Patient/Family in Agreement with Plan yes    Plan Comments Pt discussed in multidisciplinary rounds. Clinicals reviewed. Pt not yet medically ready for DC; CCP continues to follow. CCP notes palliative care has been consulted & plans to meet with family today. DC plan pending palliative care consult & pt's progress during hospitalization. DC barriers: remains intubated, Cortrak, levophed gtt, & precedex gtt. RYAN Vazquez/GINNY Solorio RN

## 2024-09-27 NOTE — CONSULTS
"Nutrition Services    Patient Name:  Dixno Montoya  YOB: 1955  MRN: [id number]  Admit Date:  9/17/2024    Assessment Date:  09/27/24    Summary: Follow up  Pt remains intubated and sedated on precedex. Remains on 1 pressor. No dialysis today per nephrology. Tf running at goal rate and pt tolerating well per RN.   K now improved at 4.7, Phos still elevated at 5.5.   Na 134, Mag 2.5, Glu 265-233-244    RECS:  Once appropriate, Initiate Novasource Renal at 45 ml/hr goal rate. Goal rate to provide 1980 kcals, 90 g/protein, and 713 ml free water. Please flush 30 ml free water q 6 hrs.    The above end goal rate is for 22 hrs/day to assume interruptions for ADLs. Water flushes adjusted based on clinical picture + Rx flushes/IV fluids     RD to follow     CLINICAL NUTRITION ASSESSMENT      Reason for Assessment Follow-up Protocol     Diagnosis/Problem   generalized weakness. Pt has a hx of HLD, HTN, ETOH abusem COPD, CAD, hepatic steatosis.   Medical/Surgical History Past Medical History:   Diagnosis Date    Anxiety     Depression     Hyperlipidemia     Hypertension      History reviewed. No pertinent surgical history.     Anthropometrics        Current Height  Current Weight  BMI kg/m2 Height: 172.7 cm (67.99\")  Weight: 84.9 kg (187 lb 2.7 oz) (09/26/24 0600)  Body mass index is 28.47 kg/m².   Adjusted BMI (if applicable)    BMI Category Overweight (25 - 29.9)   Ideal Body Weight (IBW) 150 lbs   Usual Body Weight (UBW) 170 lbs   Weight Trend Stable   Weight History Wt Readings from Last 30 Encounters:   09/26/24 0600 84.9 kg (187 lb 2.7 oz)   09/24/24 0518 85.2 kg (187 lb 13.3 oz)   09/23/24 2313 85.2 kg (187 lb 13.3 oz)   09/23/24 0502 84.2 kg (185 lb 10 oz)   09/21/24 0600 84.7 kg (186 lb 11.7 oz)   09/20/24 0400 80.9 kg (178 lb 5.6 oz)   09/19/24 0400 75.2 kg (165 lb 12.6 oz)   09/18/24 0952 78.5 kg (173 lb)   09/17/24 2341 78.5 kg (173 lb)        Estimated/Assessed Needs        Energy Requirements  "   Weight for Calculation 68 kg IBW   Method for Estimation  25-30 kcal/kg   EST Needs (kcal/day) 3108-5719       Protein Requirements    Weight for Calculation 68 kg IBW   EST Protein Needs (g/kg) 1.2 - 1.5 gm/kg2-2.5   EST Daily Needs (g/day)        Fluid Requirements     Method for Estimation 1 mL/kcal    Estimated Needs (mL/day)        Fluid Deficit    Current Na Level (mEq/L)    Desired Na Level (mEq/L)    Estimated Fluid Deficit (L)       Labs       Pertinent Labs    Results from last 7 days   Lab Units 09/27/24  0350 09/26/24  1815 09/26/24  0556 09/24/24  1434 09/24/24  0351   SODIUM mmol/L 134* 136 137   < > 134*   POTASSIUM mmol/L 4.7 5.5* 5.3*   < > 5.6*   CHLORIDE mmol/L 106 106 106   < > 102   CO2 mmol/L 19.0* 21.7* 19.0*   < > 19.8*   BUN mg/dL 50* 38* 20   < > 66*   CREATININE mg/dL 1.55* 1.28* 0.67*   < > 2.69*   CALCIUM mg/dL 6.7* 6.5* 7.0*   < > 7.5*   BILIRUBIN mg/dL 1.2  --  2.1*  --  0.5   ALK PHOS U/L 399*  --  313*  --  309*   ALT (SGPT) U/L 391*  --  368*  --  36   AST (SGOT) U/L 985*  --  1,432*  --  80*   GLUCOSE mg/dL 233* 180* 99   < > 115*    < > = values in this interval not displayed.     Results from last 7 days   Lab Units 09/27/24  0350 09/26/24  1815 09/26/24  0319 09/25/24  2339 09/25/24  1618 09/23/24  0348 09/22/24  0322   MAGNESIUM mg/dL 2.5*  --   --  2.4 2.5*   < > 2.0   PHOSPHORUS mg/dL  --  5.5*  --  4.7* 5.5*   < > 3.7   HEMOGLOBIN g/dL 7.5*  --    < >  --  9.3*   < > 8.1*   HEMATOCRIT % 22.7*  --    < >  --  26.9*   < > 23.8*   WBC 10*3/mm3 16.61*  --    < >  --  25.96*   < > 18.59*   TRIGLYCERIDES mg/dL  --   --   --   --   --   --  402*   ALBUMIN g/dL 1.5* 1.8*   < > 1.9* 2.0*   < > 2.0*    < > = values in this interval not displayed.     Results from last 7 days   Lab Units 09/27/24  0350 09/26/24  0319 09/25/24  1618 09/25/24  0354 09/24/24  0351 09/23/24  2115   INR   --   --   --   --   --  1.55*   PLATELETS 10*3/mm3 93* 133* 142 125* 104*  --      COVID19    Date Value Ref Range Status   09/18/2024 Not Detected Not Detected - Ref. Range Final     Lab Results   Component Value Date    HGBA1C 4.9 07/16/2024          Medications           Scheduled Medications aspirin, 81 mg, Oral, Daily  bisacodyl, 10 mg, Rectal, Daily  cefepime, 2,000 mg, Intravenous, Q24H  chlorhexidine, 15 mL, Mouth/Throat, Q12H  citalopram, 20 mg, Nasogastric, Daily  folic acid, 1 mg, Nasogastric, Daily  heparin (porcine), 5,000 Units, Subcutaneous, Q8H  insulin regular, 2-7 Units, Subcutaneous, Q6H  ipratropium-albuterol, 3 mL, Nebulization, 4x Daily - RT  levothyroxine, 25 mcg, Nasogastric, Q AM  metroNIDAZOLE, 500 mg, Intravenous, Q8H  pantoprazole, 40 mg, Intravenous, Q24H  sodium chloride, 10 mL, Intravenous, Q12H  thiamine, 100 mg, Nasogastric, Daily  vitamin D, 50,000 Units, Nasogastric, Q7 Days       Infusions dexmedetomidine, 0.2-1.5 mcg/kg/hr, Last Rate: 1.5 mcg/kg/hr (09/27/24 0654)  norepinephrine, 0.02-0.3 mcg/kg/min, Last Rate: 0.18 mcg/kg/min (09/27/24 0626)  Phoxillum BK4/2.5, 2,000 mL/hr, Last Rate: 2,000 mL/hr (09/25/24 2113)  Phoxillum BK4/2.5, 2,000 mL/hr, Last Rate: 2,000 mL/hr (09/25/24 2113)  Phoxillum BK4/2.5, 2,000 mL/hr, Last Rate: 2,000 mL/hr (09/25/24 2113)       PRN Medications   acetaminophen **OR** acetaminophen **OR** acetaminophen    albuterol    alteplase    aluminum-magnesium hydroxide-simethicone    senna-docusate sodium **AND** polyethylene glycol **AND** bisacodyl **AND** bisacodyl    Calcium Replacement - Follow Nurse / BPA Driven Protocol    dextrose    dextrose    fentaNYL citrate (PF)    glucagon (human recombinant)    Glycerin-Hypromellose-    heparin (porcine)    heparin (porcine)    heparin    Magnesium Standard Dose Replacement - Follow Nurse / BPA Driven Protocol    ondansetron ODT **OR** ondansetron    Phosphorus Replacement - Follow Nurse / BPA Driven Protocol    Potassium Replacement - Follow Nurse / BPA Driven Protocol    sodium chloride      Physical Findings          General Findings ventilator support   Oral/Mouth Cavity dental caries   Edema  2+ (mild), 3+ (moderate)   Gastrointestinal last bowel movement: 9/21   Skin  skin intact   Tubes/Drains/Lines Cortrak, dialysis catheter, NG tube, bridle in place   NFPE Not indicated at this time     Current Nutrition Orders & Evaluation of Intake       Oral Nutrition     Food Allergies NKFA   Current PO Diet NPO Diet NPO Type: Strict NPO   Supplement n/a   PO Evaluation     % PO Intake NPO    Factors Affecting Intake: Other: Intubated      Enteral Nutrition     Enteral Route NG    TF Delivery Method Continuous     Propofol Rate/Kcal     Current TF Order/Rate  Novasource Renal @ 45 mL/hr    TF Goal Rate 45 mL/hr    Current Water Flush 30 mL Q 4 hr    Modular None    TF Residual  residual wnl     TF Tolerance tolerating     TF Observation Verified correct TF and water flush infusing per orders     PES STATEMENT / NUTRITION DIAGNOSIS      Nutrition Dx Problem  Problem: Needs Alternative Composition  Etiology: Medical Diagnosis - generalized weakness. Pt has a hx of HLD, HTN, ETOH abusem COPD, CAD, hepatic steatosis.    Signs/Symptoms: Report/Observation   --  NUTRITION INTERVENTION / PLAN OF CARE      Intervention Goal(s) Maintain nutrition status, Establish goals of care, Meet estimated needs, Initiate TF/PN, and Tolerate TF/PN at goal         RD Intervention/Action Await initiation of EN/PN, Continue to monitor, Care plan reviewed, and Recommend/order: EN         Prescription/Orders:       PO Diet       Supplements       Enteral Nutrition    Enteral Prescription:     Enteral Route NG    TF Delivery Method Continuous    Enteral Product Novasource Renal    Modular     Propofol Rate/Kcal     TF Start Rate  45 mL/hr    TF Goal Rate  45 mL/hr    Free Water Flush 30 mL Q 6 hr    Provision at Goal:          Calories 1980 kcals  meets 100% needs         Protein  90 gm protein, meets 77% needs         Fluid (mL)  713 mL free water + 120 mL in flushes         Parenteral Nutrition    New Prescription Ordered? Yes   --      Monitor/Evaluation Per protocol, Pertinent labs, EN delivery/tolerance, Weight, Skin status, GI status, Symptoms, POC/GOC, Swallow function   Discharge Plan/Needs No discharge needs identified at this time     RD to follow per protocol.      Electronically signed by:  Mayo Pérez RDN, LD  09/27/24 09:05 EDT

## 2024-09-27 NOTE — PROGRESS NOTES
"  Daily Progress Note.   [facility] INTENSIVE CARE  9/27/2024    Patient:  Name:  Dixon Montoya  MRN:  [id number]  1955  68 y.o.  male         CC: Critical care management, intubated 9-    Summary  Patient is a 68-year-old with a history of alcohol abuse coronary artery disease who presented to the hospital with fractured left arm withdrawals respiratory failure NSTEMI heart failure renal failure with anemia thrombocytopenia needing CRRT mechanical ventilation vasopressors with acute cholecystitis and gallbladder drain placed.  Intubated now for over a week with family discussing goals of care.    Interval History:  Febrile  On [facility] ventilation and vasopressor support  Able to give history  CRRT currently held  Family discussing goals of care    Physical Exam:  BP 95/55   Pulse 87   Temp (!) 100.7 °F (38.2 °C) (Bladder)   Resp 25   Ht 172.7 cm (67.99\")   Wt 84.9 kg (187 lb 2.7 oz)   SpO2 95%   BMI 28.47 kg/m²   Body mass index is 28.47 kg/m².    Intake/Output Summary (Last 24 hours) at 9/27/2024 0834  Last data filed at 9/27/2024 0626  Gross per 24 hour   Intake 2144.19 ml   Output 125 ml   Net 2019.19 ml   Vent Settings          Resp Rate (Set): 20  Pressure Support (cm H2O): 8 cm H20  FiO2 (%): 40 %  PEEP/CPAP (cm H2O): 5 cm H20    Minute Ventilation (L/min) (Obs): 17.1 L/min  Resp Rate (Observed) Vent: 25  I:E Ratio (Set): 1:2.03  I:E Ratio (Obs): 1:2    PIP Observed (cm H2O): 26 cm H2O  Plateau Pressure (cm H2O): 0 cm H2O  Driving Pressure (cm H2O): -4.1 cm H2O    General appearance: Ill on mechanical ventilation unresponsive sedated  HENT: Positive ET tube positive right IJ Shiley  Lungs:bilateral air entry mechanical synchronous with mechanical respiration respiratory effort and no intercostal retractions  CV: RRR, no rub   Abdomen: Nonrigid positive: Gallbladder drain  Extremities: Positive peripheral edema  left arm wrapped ecchymotic less swollen  Skin: Warm  Psych/neuro " sedated limiting exam will not follow commands    Data Review:  Notable Labs:  Results from last 7 days   Lab Units 09/27/24  0350 09/26/24  1139 09/26/24  0319 09/25/24  1618 09/25/24  0354 09/24/24  0351 09/23/24  0348 09/22/24  0322   WBC 10*3/mm3 16.61*  --  23.25* 25.96* 25.70* 23.55* 18.12* 18.59*   HEMOGLOBIN g/dL 7.5* 7.1* 8.8* 9.3* 7.3* 7.4* 7.5* 8.1*   PLATELETS 10*3/mm3 93*  --  133* 142 125* 104* 73* 79*     Results from last 7 days   Lab Units 09/27/24  0350 09/26/24  1815 09/26/24  0556 09/25/24  2339 09/25/24  1618 09/25/24  1131 09/25/24  0809 09/24/24  2342 09/24/24  1752   SODIUM mmol/L 134* 136 137 137 137 134* 138 136 138   POTASSIUM mmol/L 4.7 5.5* 5.3* 4.8 5.0 5.5* 6.1* 5.7* 5.9*   CHLORIDE mmol/L 106 106 106 105 104 103 105 101 101   CO2 mmol/L 19.0* 21.7* 19.0* 20.0* 20.5* 20.1* 18.0* 20.7* 20.4*   BUN mg/dL 50* 38* 20 18 21 25* 32* 52* 76*   CREATININE mg/dL 1.55* 1.28* 0.67* 0.59* 0.88 0.99 1.21 1.97* 2.76*   GLUCOSE mg/dL 233* 180* 99 80 70 56* 36* 81 139*   CALCIUM mg/dL 6.7* 6.5* 7.0* 7.0* 7.1* 6.7* 6.6* 6.9* 6.8*   MAGNESIUM mg/dL 2.5*  --   --  2.4 2.5* 2.5* 2.5* 2.3 2.1   PHOSPHORUS mg/dL  --  5.5*  --  4.7* 5.5* 7.1* 7.1* 7.4* 9.4*   Estimated Creatinine Clearance: 49.1 mL/min (A) (by C-G formula based on SCr of 1.55 mg/dL (H)).    Results from last 7 days   Lab Units 09/27/24  0350 09/26/24  0556 09/26/24  0319 09/25/24  1618 09/25/24  0354 09/24/24  0351 09/23/24  0348 09/22/24  0322 09/21/24  0427   AST (SGOT) U/L 985* 1,432*  --   --   --  80*  --   --  78*   ALT (SGPT) U/L 391* 368*  --   --   --  36  --   --  22   LACTATE mmol/L  --   --   --   --   --   --   --  0.9 1.2   PLATELETS 10*3/mm3 93*  --  133* 142   < > 104*   < > 79* 80*    < > = values in this interval not displayed.       Results from last 7 days   Lab Units 09/27/24  0338 09/26/24  0336 09/25/24  1314 09/25/24  0355 09/24/24  0609 09/24/24  0408 09/23/24  0435 09/22/24  0317 09/21/24  1039   PH, ARTERIAL pH  units 7.360 7.377 7.392 7.439 7.216* 7.180* 7.469* 7.352 7.483*   PCO2, ARTERIAL mm Hg 34.9* 34.9* 34.9* 29.8* 54.4* 55.8* 33.6* 42.8 27.8*   PO2 ART mm Hg 93.9 78.5* 91.7 128.6* 150.1* 112.7* 79.9* 88.1 64.4*   HCO3 ART mmol/L 19.7* 20.5* 21.2* 20.2* 22.1 20.8* 24.4 23.7 20.8*     Triglycerides 402      Imaging:  Reviewed chest images personally from past 3 days    Cxr infiltrates, et tube high    Tte 9-:    Left ventricular systolic function is moderately decreased. Left ventricular ejection fraction appears to be 36 - 40%.    The left ventricular cavity is mildly dilated.    The following left ventricular wall segments are akinetic: apical anterior, apical lateral, apical inferior, apical septal and apex.    Left ventricular diastolic function is consistent with (grade I) impaired relaxation.    The right ventricular cavity is small in size and hyperdynamic.    ASSESSMENT  /  PLAN:  Septic shock  Acute hypoxemic respiratory failure mechanical ventilation reviewed  Pneumonia  UTI  Alcohol abuse  Coronary artery disease  NSTEMI with wma - cards evaluating managing  Heart failure systolic and diastolic with wall motion abn.  Hypothyroidism  Anemia  SHANA  Possible underlying COPD  PVD  AGMA  Thrombocytopenia - insetting of sepsis and etoh intake, continue to trend  Fracture of arm -ortho conulted, not really a candidate for any operative intervention right now., sling follow up 2 weeks  Heart failure systolic acute decompensated EF 35%  Chronic aortic dissection flap in infrarenal abd aorta -evaluated by [facility].  PAD - follow up with charlotte - statin and antiplt  Acute kali s/p kali tube via IR 9/23/24  Transaminits - ? Gb? Hepatic congestion? Shock?  Trend ultrasound reviewed      Continue to monitor renal function renal replacement needs appreciate nephrology assistance -holding today monitor needs and decision of family decisions    Serial ABG chest x-rays mechanical ventilation reviewed adjustments as  appropriate.  Oxygenation limiting SBT's - will need fluid removal but limited by septic shock  DuoNebs    Off propofol given elevated trace of glycerides.  Precedex  Fentanyl as needed    Wean vasopressors as able    Thiamine  Folic acid      Flagyl/cefepime/  Klebsiella oxytocin reviewed  Follow microbiology - gpc  Milli tube  Abx guided by ID  TENNILLE US -no obstruction     Plan of care and patient course was reviewed with multidisciplinary team in morning rounds.    Discussed goc with patient's children, will have palliative care see them today.  They are pretty sure pt wouldn't want trach peg if decision came down to that.     Total critical care time was 45 minutes, excluding any separately billable procedure time.  Time did not overlap with any other provider.    Electronically signed by Christoph Lizama MD, 09/27/24, 9:54 AM EDT.

## 2024-09-28 LAB
BACTERIA SPEC AEROBE CULT: NORMAL
BACTERIA SPEC ANAEROBE CULT: NORMAL

## 2024-09-30 NOTE — CASE MANAGEMENT/SOCIAL WORK
Case Management Discharge Note      Final Note: Pt           Selected Continued Care - Discharged on 2024 Admission date: 2024 - Discharge disposition:      Final Discharge Disposition Code: 41 -  in medical facility

## 2024-09-30 NOTE — PROGRESS NOTES
"Enter Query Response Below      Query Response: ATN ruled in              If applicable, please update the problem list.   Patient: Dixon Montoya        : 1955  Account: [id number]           Admit Date: 2024        How to Respond to this query:       a. Click New Note     b. Answer query within the yellow box.                c. Update the Problem List, if applicable.      If you have any questions about this query contact me at: [email or web address]  [phone]      Dr. Bush:     68 y M admitted () with sepsis, pneumonia, SHANA, hyperkalemia. Nephrology progress note (), \"Likely ATN, was on CRRT, stopped yesterday, and now appears to be recovering.\" Subsequent notes included (-), \"Likely ATN, was on CRRT that was stopped on 2023.\" Patient terminally extubated  on comfort measures, pt .     Please clarify the following:    ATN ruled in  ANT ruled out  Other- specify______  Unable to determine    By submitting this query, we are merely seeking further clarification of documentation to accurately reflect all conditions that you are monitoring, evaluating, treating or that extend the hospitalization or utilize additional resources of care. Please utilize your independent clinical judgment when addressing the question(s) above.     This query and your response, once completed, will be entered into the legal medical record.    Sincerely,    LATONYA Wang, RN, CCDS  Clinical Documentation Integrity Program     "

## 2024-10-02 NOTE — PROGRESS NOTES
"Enter Query Response Below      Query Response: Unable to determine              If applicable, please update the problem list.   Patient: Dixon Montoya        : 1955  Account: [id number]           Admit Date: 2024        How to Respond to this query:       a. Click New Note     b. Answer query within the yellow box.                c. Update the Problem List, if applicable.      If you have any questions about this query contact me at: [email or web address]  [phone]      Dr. Lizama:     68 y M admitted () with sepsis, pneumonia, SHANA, hyperkalemia. () Nephrology last progress note, \"Continues to be on pressors will pressor marginal stable CRRT was stopped yesterday due to clotting and was not restarted. Assessment diagnosis:  Thrombocytopenia likely secondary to DIC.\" Pulmonary last progress note (), \" Septic shock, acute hypoxic respiratory failure on mechanical ventilation, Thrombocytopenia - insetting of sepsis and etoh intake, continue to trend. Transaminits - ? Gb? Hepatic congestion? Shock?  Trend ultrasound.\" Labs: PT 18.8 (), 16.1 (); Plt 93(), AST 1432/ (), / (). Patient was terminally extubated with comfort measures,  . No discharge summary available at present.     Please clarify the following:    DIC Ruled In  DIC Ruled Out, Thrombocytopenia due to sepsis and ETOH intake  Other- specify ________   Unable to determine    By submitting this query, we are merely seeking further clarification of documentation to accurately reflect all conditions that you are monitoring, evaluating, treating or that extend the hospitalization or utilize additional resources of care. Please utilize your independent clinical judgment when addressing the question(s) above.     This query and your response, once completed, will be entered into the legal medical record.    Sincerely,    LATONYA Wang, RN, CCDS  Clinical Documentation Integrity " Program

## 2024-10-02 NOTE — DISCHARGE SUMMARY
[facility]  Discharge Summary    Date of Admission: 2024  Date of Death:  2024    PCP: Jaquelin Rueda, ADARSH    Principle Cause of Death:   Septic shock  Acute hypoxemic respiratory failure mechanical ventilation reviewed  Pneumonia  UTI  Alcohol abuse  Coronary artery disease  NSTEMI with wma - cards evaluating managing  Heart failure systolic and diastolic with wall motion abn.  Hypothyroidism  Anemia  SHANA  Possible underlying COPD  PVD  AGMA  Thrombocytopenia - insetting of sepsis and etoh intake, continue to trend  Fracture of arm -ortho conulted, not really a candidate for any operative intervention right now., sling follow up 2 weeks  Heart failure systolic acute decompensated EF 35%  Chronic aortic dissection flap in infrarenal abd aorta -evaluated by [facility].  PAD - follow up with charlotte - statin and antiplt  Acute kali s/p kali tube via IR 24  Transaminits - ? Gb? Hepatic congestion? Shock?  Trend ultrasound reviewed    Hospital Course  Patient was a 68 y.o. male who presented to [facility] with septic shock and multisystem organ failure.  Despite aggressive care patient continued to be critically ill.  Given severity of illness, family elected to proceed with comfort measures.  He .      Christoph Lizama MD  10/02/24  08:16 EDT

## 2024-10-02 NOTE — PROGRESS NOTES
"Enter Query Response Below      Query Response: multifactorial              If applicable, please update the problem list.   Patient: Dixon Montoya        : 1955  Account: [id number]           Admit Date: 2024        How to Respond to this query:       a. Click New Note     b. Answer query within the yellow box.                c. Update the Problem List, if applicable.      If you have any questions about this query contact me at: [email or web address]  [phone]      Dr. Lizama:     68 y M with last progress note (), \" Transaminits - ? Gb? Hepatic congestion? Shock?  Trend ultrasound reviewed, septic shock, acute hypoxemic respiratory failure mechanical ventilation, SHANA, Thrombocytopenia - insetting of sepsis and etoh intake, continue to trend. Continue to monitor renal function renal replacement needs appreciate nephrology assistance -holding today monitor needs and decision of family decisions.\" Nephrology last progress note (), \"Thrombocytopenia likely secondary to DIC.  Olig -> non olig SHANA.  Likely ATN, was on CRRt that was stopped on 2023.\" AST/ALT 31/19 ( admission), AST/ALT 1432/368 (), 985/391 (). Patient was terminally extubated with comfort measures (), patient .     Please clarify if patient treated/monitored for one or more of the following:    Shock liver   Other- specify ______    By submitting this query, we are merely seeking further clarification of documentation to accurately reflect all conditions that you are monitoring, evaluating, treating or that extend the hospitalization or utilize additional resources of care. Please utilize your independent clinical judgment when addressing the question(s) above.     This query and your response, once completed, will be entered into the legal medical record.    Sincerely,    LATONYA Wang, RN, CCDS  Clinical Documentation Integrity Program     "